# Patient Record
Sex: MALE | Race: WHITE | Employment: OTHER | URBAN - METROPOLITAN AREA
[De-identification: names, ages, dates, MRNs, and addresses within clinical notes are randomized per-mention and may not be internally consistent; named-entity substitution may affect disease eponyms.]

---

## 2017-01-23 DIAGNOSIS — F51.01 PRIMARY INSOMNIA: ICD-10-CM

## 2017-01-23 RX ORDER — ZOLPIDEM TARTRATE 10 MG/1
TABLET ORAL
Qty: 30 TABLET | Refills: 0 | Status: SHIPPED | OUTPATIENT
Start: 2017-01-23 | End: 2017-02-21 | Stop reason: SDUPTHER

## 2017-02-21 ENCOUNTER — NURSE ONLY (OUTPATIENT)
Dept: FAMILY MEDICINE CLINIC | Age: 67
End: 2017-02-21

## 2017-02-21 DIAGNOSIS — F51.01 PRIMARY INSOMNIA: ICD-10-CM

## 2017-02-21 DIAGNOSIS — F31.81 BIPOLAR 2 DISORDER (HCC): Primary | ICD-10-CM

## 2017-02-21 PROCEDURE — 36415 COLL VENOUS BLD VENIPUNCTURE: CPT | Performed by: FAMILY MEDICINE

## 2017-02-21 RX ORDER — ZOLPIDEM TARTRATE 10 MG/1
TABLET ORAL
Qty: 30 TABLET | Refills: 0 | Status: SHIPPED | OUTPATIENT
Start: 2017-02-21 | End: 2017-03-21 | Stop reason: SDUPTHER

## 2017-02-23 ENCOUNTER — OFFICE VISIT (OUTPATIENT)
Dept: FAMILY MEDICINE CLINIC | Age: 67
End: 2017-02-23

## 2017-02-23 VITALS
BODY MASS INDEX: 23.29 KG/M2 | RESPIRATION RATE: 16 BRPM | HEART RATE: 80 BPM | OXYGEN SATURATION: 98 % | DIASTOLIC BLOOD PRESSURE: 70 MMHG | WEIGHT: 167 LBS | SYSTOLIC BLOOD PRESSURE: 124 MMHG

## 2017-02-23 DIAGNOSIS — H91.90 HEARING LOSS, UNSPECIFIED LATERALITY: ICD-10-CM

## 2017-02-23 DIAGNOSIS — R09.81 SINUS CONGESTION: Primary | ICD-10-CM

## 2017-02-23 DIAGNOSIS — F51.01 PRIMARY INSOMNIA: ICD-10-CM

## 2017-02-23 DIAGNOSIS — H61.20 WAX IN EAR: ICD-10-CM

## 2017-02-23 PROCEDURE — 99214 OFFICE O/P EST MOD 30 MIN: CPT | Performed by: FAMILY MEDICINE

## 2017-02-23 RX ORDER — FLUTICASONE PROPIONATE 50 MCG
2 SPRAY, SUSPENSION (ML) NASAL DAILY
Qty: 1 BOTTLE | Refills: 5 | Status: SHIPPED | OUTPATIENT
Start: 2017-02-23 | End: 2018-10-08 | Stop reason: ALTCHOICE

## 2017-03-21 DIAGNOSIS — F51.01 PRIMARY INSOMNIA: ICD-10-CM

## 2017-03-21 RX ORDER — ZOLPIDEM TARTRATE 10 MG/1
TABLET ORAL
Qty: 30 TABLET | Refills: 0 | OUTPATIENT
Start: 2017-03-21 | End: 2017-04-17 | Stop reason: SDUPTHER

## 2017-04-17 DIAGNOSIS — F51.01 PRIMARY INSOMNIA: ICD-10-CM

## 2017-04-17 RX ORDER — ZOLPIDEM TARTRATE 10 MG/1
TABLET ORAL
Qty: 30 TABLET | Refills: 0 | Status: SHIPPED | OUTPATIENT
Start: 2017-04-17 | End: 2017-05-17 | Stop reason: SDUPTHER

## 2017-04-18 ENCOUNTER — TELEPHONE (OUTPATIENT)
Dept: FAMILY MEDICINE CLINIC | Age: 67
End: 2017-04-18

## 2017-05-02 ENCOUNTER — TELEPHONE (OUTPATIENT)
Dept: FAMILY MEDICINE CLINIC | Age: 67
End: 2017-05-02

## 2017-05-17 DIAGNOSIS — F51.01 PRIMARY INSOMNIA: ICD-10-CM

## 2017-05-18 RX ORDER — ZOLPIDEM TARTRATE 10 MG/1
TABLET ORAL
Qty: 30 TABLET | Refills: 0 | Status: SHIPPED | OUTPATIENT
Start: 2017-05-18 | End: 2017-06-14 | Stop reason: SDUPTHER

## 2017-06-14 DIAGNOSIS — F51.01 PRIMARY INSOMNIA: ICD-10-CM

## 2017-06-14 RX ORDER — ZOLPIDEM TARTRATE 10 MG/1
TABLET ORAL
Qty: 30 TABLET | Refills: 0 | Status: SHIPPED | OUTPATIENT
Start: 2017-06-14 | End: 2017-07-17 | Stop reason: SDUPTHER

## 2017-07-11 ENCOUNTER — NURSE ONLY (OUTPATIENT)
Dept: FAMILY MEDICINE CLINIC | Age: 67
End: 2017-07-11

## 2017-07-11 DIAGNOSIS — F31.81 BIPOLAR II DISORDER (HCC): Primary | ICD-10-CM

## 2017-07-11 PROCEDURE — 36415 COLL VENOUS BLD VENIPUNCTURE: CPT | Performed by: FAMILY MEDICINE

## 2017-07-17 DIAGNOSIS — F51.01 PRIMARY INSOMNIA: ICD-10-CM

## 2017-07-17 RX ORDER — ZOLPIDEM TARTRATE 10 MG/1
TABLET ORAL
Qty: 30 TABLET | Refills: 0 | Status: SHIPPED | OUTPATIENT
Start: 2017-07-17 | End: 2017-08-18 | Stop reason: SDUPTHER

## 2017-07-27 ENCOUNTER — NURSE ONLY (OUTPATIENT)
Dept: FAMILY MEDICINE CLINIC | Age: 67
End: 2017-07-27

## 2017-07-27 DIAGNOSIS — F31.81 BIPOLAR 2 DISORDER (HCC): ICD-10-CM

## 2017-07-27 PROCEDURE — 36415 COLL VENOUS BLD VENIPUNCTURE: CPT | Performed by: FAMILY MEDICINE

## 2017-08-18 DIAGNOSIS — F51.01 PRIMARY INSOMNIA: ICD-10-CM

## 2017-08-18 RX ORDER — ZOLPIDEM TARTRATE 10 MG/1
TABLET ORAL
Qty: 30 TABLET | Refills: 0 | OUTPATIENT
Start: 2017-08-18 | End: 2017-08-18

## 2017-08-18 RX ORDER — ZOLPIDEM TARTRATE 10 MG/1
TABLET ORAL
Qty: 30 TABLET | Refills: 0 | OUTPATIENT
Start: 2017-08-18 | End: 2017-09-19 | Stop reason: SDUPTHER

## 2017-09-19 DIAGNOSIS — F51.01 PRIMARY INSOMNIA: ICD-10-CM

## 2017-09-19 RX ORDER — ZOLPIDEM TARTRATE 10 MG/1
TABLET ORAL
Qty: 30 TABLET | Refills: 0 | OUTPATIENT
Start: 2017-09-19 | End: 2017-10-19 | Stop reason: SDUPTHER

## 2017-10-03 ENCOUNTER — PATIENT MESSAGE (OUTPATIENT)
Dept: FAMILY MEDICINE CLINIC | Age: 67
End: 2017-10-03

## 2017-10-03 NOTE — TELEPHONE ENCOUNTER
From: [employer]  To: Africa Rankin MD  Sent: 10/3/2017 3:32 PM EDT  Subject: Non-Urgent Ryan Hoang, hope all is well with you:)     Q. I have been getting a colonoscopy every five years since I was 38. There has never been any pathology or anything else. I'm now 74 and I was wondering if I should still get one. The last one was when I was 63. The physician who did them all, Stevenson Hedrick, retired last year. That's it, have a great day.      Amie Art

## 2017-10-19 DIAGNOSIS — F51.01 PRIMARY INSOMNIA: ICD-10-CM

## 2017-10-19 RX ORDER — ZOLPIDEM TARTRATE 10 MG/1
TABLET ORAL
Qty: 30 TABLET | Refills: 0 | Status: SHIPPED | OUTPATIENT
Start: 2017-10-19 | End: 2017-11-21 | Stop reason: SDUPTHER

## 2017-11-20 ENCOUNTER — NURSE ONLY (OUTPATIENT)
Dept: FAMILY MEDICINE CLINIC | Age: 67
End: 2017-11-20

## 2017-11-20 DIAGNOSIS — F31.81 BIPOLAR 2 DISORDER (HCC): Primary | ICD-10-CM

## 2017-11-20 PROCEDURE — 36415 COLL VENOUS BLD VENIPUNCTURE: CPT | Performed by: FAMILY MEDICINE

## 2017-11-20 NOTE — PROGRESS NOTES
Patient came into the office per Dr. Rowena Cade request for the following blood test(s): Lithium serum       Blood drawn in office by [facility]     # of tubes sent: 1 SST

## 2017-11-21 DIAGNOSIS — F51.01 PRIMARY INSOMNIA: ICD-10-CM

## 2017-11-22 RX ORDER — ZOLPIDEM TARTRATE 10 MG/1
TABLET ORAL
Qty: 30 TABLET | Refills: 0 | Status: SHIPPED | OUTPATIENT
Start: 2017-11-22 | End: 2017-12-22 | Stop reason: SDUPTHER

## 2017-11-22 NOTE — TELEPHONE ENCOUNTER
Patient is calling again to check the status of the refill. He is at the pharmacy and they still don't have the approval. Stated he is leaving in a few hours and will be gone for 4 days and he will run out.

## 2017-12-22 DIAGNOSIS — F51.01 PRIMARY INSOMNIA: ICD-10-CM

## 2017-12-22 RX ORDER — ZOLPIDEM TARTRATE 10 MG/1
TABLET ORAL
Qty: 30 TABLET | Refills: 0 | OUTPATIENT
Start: 2017-12-22 | End: 2018-01-30 | Stop reason: SDUPTHER

## 2017-12-22 NOTE — TELEPHONE ENCOUNTER
Last Seen: 2/23/17  Last Filled: 11/22/17  Last UDS: 8/25/16  OARRS Run On: 8/18/17  Med Agreement Signed On:8/25/16  Next Appointment: Nik Gonzalez

## 2017-12-27 DIAGNOSIS — F51.01 PRIMARY INSOMNIA: ICD-10-CM

## 2017-12-28 RX ORDER — ZOLPIDEM TARTRATE 10 MG/1
TABLET ORAL
Qty: 30 TABLET | Refills: 0 | OUTPATIENT
Start: 2017-12-28

## 2017-12-28 NOTE — TELEPHONE ENCOUNTER
Called into [facility] zolpidem (AMBIEN) 10 MG tablet [[id number]]   Order Details   Dose, Route, Frequency: As Directed    Dispense Quantity:  30 tablet Refills:  0 Fills remaining:  --           Sig: TAKE ONE-HALF TO ONE TABLET BY MOUTH EVERY NIGHT AT BEDTIME          Written Date:  12/22/17 Expiration Date:  06/20/18     Start Date:  12/22/17 End Date:  --     Ordering Provider:  -- Authorizing Provider:  Tonia Yanes MD Ordering User:  Tonia Yanes MD          Diagnosis Association: Primary insomnia (F51.01)      Original Order:  zolpidem (AMBIEN) 10 MG tablet [[id number]]      Pharmacy:  [address] [phone]      Pharmacy Comments:  --

## 2018-01-30 DIAGNOSIS — F51.01 PRIMARY INSOMNIA: ICD-10-CM

## 2018-01-30 RX ORDER — ZOLPIDEM TARTRATE 10 MG/1
TABLET ORAL
Qty: 30 TABLET | Refills: 0 | OUTPATIENT
Start: 2018-01-30 | End: 2018-03-01

## 2018-02-22 ENCOUNTER — NURSE ONLY (OUTPATIENT)
Dept: FAMILY MEDICINE CLINIC | Age: 68
End: 2018-02-22

## 2018-02-22 DIAGNOSIS — F31.81 BIPOLAR 2 DISORDER (HCC): ICD-10-CM

## 2018-02-22 PROCEDURE — 36415 COLL VENOUS BLD VENIPUNCTURE: CPT | Performed by: FAMILY MEDICINE

## 2018-03-08 ENCOUNTER — OFFICE VISIT (OUTPATIENT)
Dept: FAMILY MEDICINE CLINIC | Age: 68
End: 2018-03-08

## 2018-03-08 VITALS
SYSTOLIC BLOOD PRESSURE: 120 MMHG | WEIGHT: 170 LBS | OXYGEN SATURATION: 96 % | BODY MASS INDEX: 23.71 KG/M2 | DIASTOLIC BLOOD PRESSURE: 80 MMHG | HEART RATE: 85 BPM

## 2018-03-08 DIAGNOSIS — G47.01 INSOMNIA DUE TO MEDICAL CONDITION: Primary | ICD-10-CM

## 2018-03-08 DIAGNOSIS — M25.50 ARTHRALGIA OF MULTIPLE JOINTS: ICD-10-CM

## 2018-03-08 PROCEDURE — 1123F ACP DISCUSS/DSCN MKR DOCD: CPT | Performed by: FAMILY MEDICINE

## 2018-03-08 PROCEDURE — 4040F PNEUMOC VAC/ADMIN/RCVD: CPT | Performed by: FAMILY MEDICINE

## 2018-03-08 PROCEDURE — G8484 FLU IMMUNIZE NO ADMIN: HCPCS | Performed by: FAMILY MEDICINE

## 2018-03-08 PROCEDURE — G8427 DOCREV CUR MEDS BY ELIG CLIN: HCPCS | Performed by: FAMILY MEDICINE

## 2018-03-08 PROCEDURE — 3017F COLORECTAL CA SCREEN DOC REV: CPT | Performed by: FAMILY MEDICINE

## 2018-03-08 PROCEDURE — G8420 CALC BMI NORM PARAMETERS: HCPCS | Performed by: FAMILY MEDICINE

## 2018-03-08 PROCEDURE — 1036F TOBACCO NON-USER: CPT | Performed by: FAMILY MEDICINE

## 2018-03-08 PROCEDURE — 99213 OFFICE O/P EST LOW 20 MIN: CPT | Performed by: FAMILY MEDICINE

## 2018-03-08 RX ORDER — ZOLPIDEM TARTRATE 10 MG/1
TABLET ORAL
Qty: 30 TABLET | Refills: 5 | Status: SHIPPED | OUTPATIENT
Start: 2018-03-08 | End: 2018-09-14 | Stop reason: SDUPTHER

## 2018-03-08 ASSESSMENT — ENCOUNTER SYMPTOMS: SHORTNESS OF BREATH: 0

## 2018-04-02 ENCOUNTER — OFFICE VISIT (OUTPATIENT)
Dept: FAMILY MEDICINE CLINIC | Age: 68
End: 2018-04-02

## 2018-04-02 VITALS
OXYGEN SATURATION: 98 % | HEIGHT: 70 IN | SYSTOLIC BLOOD PRESSURE: 116 MMHG | WEIGHT: 168 LBS | DIASTOLIC BLOOD PRESSURE: 80 MMHG | HEART RATE: 79 BPM | BODY MASS INDEX: 24.05 KG/M2

## 2018-04-02 DIAGNOSIS — E78.2 MIXED HYPERLIPIDEMIA: ICD-10-CM

## 2018-04-02 DIAGNOSIS — Z13.6 SCREENING FOR AAA (ABDOMINAL AORTIC ANEURYSM): ICD-10-CM

## 2018-04-02 DIAGNOSIS — Z11.4 SCREENING FOR HIV WITHOUT PRESENCE OF RISK FACTORS: ICD-10-CM

## 2018-04-02 DIAGNOSIS — Z23 NEED FOR VACCINATION FOR PNEUMOCOCCUS: ICD-10-CM

## 2018-04-02 DIAGNOSIS — Z23 NEED FOR SHINGLES VACCINE: ICD-10-CM

## 2018-04-02 DIAGNOSIS — Z11.59 NEED FOR HEPATITIS C SCREENING TEST: ICD-10-CM

## 2018-04-02 DIAGNOSIS — Z12.11 COLON CANCER SCREENING: ICD-10-CM

## 2018-04-02 DIAGNOSIS — F31.81 BIPOLAR 2 DISORDER (HCC): ICD-10-CM

## 2018-04-02 DIAGNOSIS — Z00.00 ROUTINE GENERAL MEDICAL EXAMINATION AT A HEALTH CARE FACILITY: Primary | ICD-10-CM

## 2018-04-02 DIAGNOSIS — E55.9 VITAMIN D DEFICIENCY: ICD-10-CM

## 2018-04-02 LAB
A/G RATIO: 2 (ref 1.1–2.2)
ALBUMIN SERPL-MCNC: 4.5 G/DL (ref 3.4–5)
ALP BLD-CCNC: 64 U/L (ref 40–129)
ALT SERPL-CCNC: 24 U/L (ref 10–40)
ANION GAP SERPL CALCULATED.3IONS-SCNC: 12 MMOL/L (ref 3–16)
AST SERPL-CCNC: 31 U/L (ref 15–37)
BILIRUB SERPL-MCNC: <0.2 MG/DL (ref 0–1)
BUN BLDV-MCNC: 18 MG/DL (ref 7–20)
CALCIUM SERPL-MCNC: 9.3 MG/DL (ref 8.3–10.6)
CHLORIDE BLD-SCNC: 101 MMOL/L (ref 99–110)
CHOLESTEROL, TOTAL: 199 MG/DL (ref 0–199)
CO2: 28 MMOL/L (ref 21–32)
CREAT SERPL-MCNC: 0.9 MG/DL (ref 0.8–1.3)
GFR AFRICAN AMERICAN: >60
GFR NON-AFRICAN AMERICAN: >60
GLOBULIN: 2.3 G/DL
GLUCOSE BLD-MCNC: 91 MG/DL (ref 70–99)
HDLC SERPL-MCNC: 74 MG/DL (ref 40–60)
HEPATITIS C ANTIBODY INTERPRETATION: NORMAL
LDL CHOLESTEROL CALCULATED: 111 MG/DL
POTASSIUM SERPL-SCNC: 5.7 MMOL/L (ref 3.5–5.1)
SODIUM BLD-SCNC: 141 MMOL/L (ref 136–145)
TOTAL PROTEIN: 6.8 G/DL (ref 6.4–8.2)
TRIGL SERPL-MCNC: 71 MG/DL (ref 0–150)
VLDLC SERPL CALC-MCNC: 14 MG/DL

## 2018-04-02 PROCEDURE — G0438 PPPS, INITIAL VISIT: HCPCS | Performed by: FAMILY MEDICINE

## 2018-04-02 PROCEDURE — 90670 PCV13 VACCINE IM: CPT | Performed by: FAMILY MEDICINE

## 2018-04-02 PROCEDURE — G0009 ADMIN PNEUMOCOCCAL VACCINE: HCPCS | Performed by: FAMILY MEDICINE

## 2018-04-02 PROCEDURE — 36415 COLL VENOUS BLD VENIPUNCTURE: CPT | Performed by: FAMILY MEDICINE

## 2018-04-02 RX ORDER — VENLAFAXINE 75 MG/1
187 TABLET ORAL DAILY
COMMUNITY
End: 2022-04-13

## 2018-04-02 ASSESSMENT — LIFESTYLE VARIABLES: HOW OFTEN DO YOU HAVE A DRINK CONTAINING ALCOHOL: 0

## 2018-04-02 ASSESSMENT — ANXIETY QUESTIONNAIRES: GAD7 TOTAL SCORE: 1

## 2018-04-02 ASSESSMENT — PATIENT HEALTH QUESTIONNAIRE - PHQ9: SUM OF ALL RESPONSES TO PHQ QUESTIONS 1-9: 0

## 2018-04-03 LAB — VITAMIN D 25-HYDROXY: 24.5 NG/ML

## 2018-04-05 LAB
HIV AG/AB: NORMAL
HIV ANTIGEN: NORMAL
HIV-1 ANTIBODY: NORMAL
HIV-2 AB: NORMAL

## 2018-04-19 ENCOUNTER — OFFICE VISIT (OUTPATIENT)
Dept: ORTHOPEDIC SURGERY | Age: 68
End: 2018-04-19

## 2018-04-19 VITALS
SYSTOLIC BLOOD PRESSURE: 120 MMHG | BODY MASS INDEX: 23.1 KG/M2 | HEIGHT: 71 IN | WEIGHT: 165 LBS | DIASTOLIC BLOOD PRESSURE: 81 MMHG | HEART RATE: 87 BPM

## 2018-04-19 DIAGNOSIS — M25.551 HIP PAIN, RIGHT: Primary | ICD-10-CM

## 2018-04-19 DIAGNOSIS — M24.851 SNAPPING HIP SYNDROME, RIGHT: ICD-10-CM

## 2018-04-19 PROCEDURE — 99203 OFFICE O/P NEW LOW 30 MIN: CPT | Performed by: PHYSICIAN ASSISTANT

## 2018-04-19 PROCEDURE — G8420 CALC BMI NORM PARAMETERS: HCPCS | Performed by: PHYSICIAN ASSISTANT

## 2018-04-19 PROCEDURE — 1036F TOBACCO NON-USER: CPT | Performed by: PHYSICIAN ASSISTANT

## 2018-04-19 PROCEDURE — 3017F COLORECTAL CA SCREEN DOC REV: CPT | Performed by: PHYSICIAN ASSISTANT

## 2018-04-19 PROCEDURE — 4040F PNEUMOC VAC/ADMIN/RCVD: CPT | Performed by: PHYSICIAN ASSISTANT

## 2018-04-19 PROCEDURE — G8427 DOCREV CUR MEDS BY ELIG CLIN: HCPCS | Performed by: PHYSICIAN ASSISTANT

## 2018-04-19 PROCEDURE — 1123F ACP DISCUSS/DSCN MKR DOCD: CPT | Performed by: PHYSICIAN ASSISTANT

## 2018-04-23 ENCOUNTER — NURSE ONLY (OUTPATIENT)
Dept: FAMILY MEDICINE CLINIC | Age: 68
End: 2018-04-23

## 2018-04-23 DIAGNOSIS — E87.5 SERUM POTASSIUM ELEVATED: Primary | ICD-10-CM

## 2018-04-23 LAB
ANION GAP SERPL CALCULATED.3IONS-SCNC: 14 MMOL/L (ref 3–16)
BUN BLDV-MCNC: 17 MG/DL (ref 7–20)
CALCIUM SERPL-MCNC: 9.2 MG/DL (ref 8.3–10.6)
CHLORIDE BLD-SCNC: 103 MMOL/L (ref 99–110)
CO2: 26 MMOL/L (ref 21–32)
CREAT SERPL-MCNC: 0.9 MG/DL (ref 0.8–1.3)
GFR AFRICAN AMERICAN: >60
GFR NON-AFRICAN AMERICAN: >60
GLUCOSE BLD-MCNC: 112 MG/DL (ref 70–99)
POTASSIUM SERPL-SCNC: 4.3 MMOL/L (ref 3.5–5.1)
SODIUM BLD-SCNC: 143 MMOL/L (ref 136–145)

## 2018-04-23 PROCEDURE — 36415 COLL VENOUS BLD VENIPUNCTURE: CPT | Performed by: FAMILY MEDICINE

## 2018-04-24 ENCOUNTER — HOSPITAL ENCOUNTER (OUTPATIENT)
Dept: PHYSICAL THERAPY | Age: 68
Discharge: HOME | End: 2018-04-30
Admitting: PHYSICIAN ASSISTANT

## 2018-05-01 ENCOUNTER — HOSPITAL ENCOUNTER (OUTPATIENT)
Dept: PHYSICAL THERAPY | Age: 68
Discharge: HOME | End: 2018-05-31
Attending: PHYSICIAN ASSISTANT | Admitting: PHYSICIAN ASSISTANT

## 2018-05-01 ENCOUNTER — HOSPITAL ENCOUNTER (OUTPATIENT)
Dept: PHYSICAL THERAPY | Age: 68
Discharge: HOME | End: 2018-05-02
Admitting: PHYSICIAN ASSISTANT

## 2018-05-08 ENCOUNTER — HOSPITAL ENCOUNTER (OUTPATIENT)
Dept: PHYSICAL THERAPY | Age: 68
Discharge: HOME | End: 2018-05-09
Admitting: PHYSICIAN ASSISTANT

## 2018-05-10 ENCOUNTER — OFFICE VISIT (OUTPATIENT)
Dept: ORTHOPEDIC SURGERY | Age: 68
End: 2018-05-10

## 2018-05-10 VITALS — BODY MASS INDEX: 23.09 KG/M2 | WEIGHT: 164.9 LBS | HEIGHT: 71 IN

## 2018-05-10 DIAGNOSIS — M24.851 SNAPPING HIP SYNDROME, RIGHT: Primary | ICD-10-CM

## 2018-05-10 DIAGNOSIS — M70.61 TROCHANTERIC BURSITIS OF RIGHT HIP: ICD-10-CM

## 2018-05-10 PROCEDURE — 3017F COLORECTAL CA SCREEN DOC REV: CPT | Performed by: ORTHOPAEDIC SURGERY

## 2018-05-10 PROCEDURE — G8427 DOCREV CUR MEDS BY ELIG CLIN: HCPCS | Performed by: ORTHOPAEDIC SURGERY

## 2018-05-10 PROCEDURE — 1123F ACP DISCUSS/DSCN MKR DOCD: CPT | Performed by: ORTHOPAEDIC SURGERY

## 2018-05-10 PROCEDURE — G8420 CALC BMI NORM PARAMETERS: HCPCS | Performed by: ORTHOPAEDIC SURGERY

## 2018-05-10 PROCEDURE — 99213 OFFICE O/P EST LOW 20 MIN: CPT | Performed by: ORTHOPAEDIC SURGERY

## 2018-05-10 PROCEDURE — 1036F TOBACCO NON-USER: CPT | Performed by: ORTHOPAEDIC SURGERY

## 2018-05-10 PROCEDURE — 4040F PNEUMOC VAC/ADMIN/RCVD: CPT | Performed by: ORTHOPAEDIC SURGERY

## 2018-05-15 ENCOUNTER — HOSPITAL ENCOUNTER (OUTPATIENT)
Dept: PHYSICAL THERAPY | Age: 68
Discharge: HOME | End: 2018-05-16
Admitting: PHYSICIAN ASSISTANT

## 2018-05-29 ENCOUNTER — HOSPITAL ENCOUNTER (OUTPATIENT)
Dept: PHYSICAL THERAPY | Age: 68
Discharge: HOME | End: 2018-05-30
Admitting: PHYSICIAN ASSISTANT

## 2018-06-01 ENCOUNTER — HOSPITAL ENCOUNTER (OUTPATIENT)
Dept: PHYSICAL THERAPY | Age: 68
Discharge: HOME | End: 2018-06-30
Attending: PHYSICIAN ASSISTANT | Admitting: PHYSICIAN ASSISTANT

## 2018-07-31 ENCOUNTER — NURSE ONLY (OUTPATIENT)
Dept: FAMILY MEDICINE CLINIC | Age: 68
End: 2018-07-31

## 2018-07-31 DIAGNOSIS — F31.81 BIPOLAR 2 DISORDER (HCC): Primary | ICD-10-CM

## 2018-07-31 NOTE — PROGRESS NOTES
Patient came into the office per physician's request for the following blood test(s): lithium.       Blood drawn in office by [facility]    # of tubes sent: 1 SST

## 2018-09-14 DIAGNOSIS — G47.01 INSOMNIA DUE TO MEDICAL CONDITION: ICD-10-CM

## 2018-09-14 RX ORDER — ZOLPIDEM TARTRATE 10 MG/1
TABLET ORAL
Qty: 30 TABLET | Refills: 5 | Status: SHIPPED | OUTPATIENT
Start: 2018-09-14 | End: 2018-10-08 | Stop reason: SDUPTHER

## 2018-09-18 DIAGNOSIS — G47.01 INSOMNIA DUE TO MEDICAL CONDITION: ICD-10-CM

## 2018-09-18 RX ORDER — ZOLPIDEM TARTRATE 10 MG/1
TABLET ORAL
Qty: 30 TABLET | Refills: 0 | OUTPATIENT
Start: 2018-09-18 | End: 2018-10-18

## 2018-10-08 ENCOUNTER — OFFICE VISIT (OUTPATIENT)
Dept: FAMILY MEDICINE CLINIC | Age: 68
End: 2018-10-08
Payer: MEDICARE

## 2018-10-08 VITALS
DIASTOLIC BLOOD PRESSURE: 80 MMHG | OXYGEN SATURATION: 96 % | WEIGHT: 163 LBS | HEART RATE: 83 BPM | BODY MASS INDEX: 22.74 KG/M2 | SYSTOLIC BLOOD PRESSURE: 100 MMHG

## 2018-10-08 DIAGNOSIS — G47.01 INSOMNIA DUE TO MEDICAL CONDITION: ICD-10-CM

## 2018-10-08 DIAGNOSIS — L82.1 SEBORRHEIC KERATOSES: Primary | ICD-10-CM

## 2018-10-08 PROCEDURE — G8420 CALC BMI NORM PARAMETERS: HCPCS | Performed by: FAMILY MEDICINE

## 2018-10-08 PROCEDURE — 3017F COLORECTAL CA SCREEN DOC REV: CPT | Performed by: FAMILY MEDICINE

## 2018-10-08 PROCEDURE — 4040F PNEUMOC VAC/ADMIN/RCVD: CPT | Performed by: FAMILY MEDICINE

## 2018-10-08 PROCEDURE — 99213 OFFICE O/P EST LOW 20 MIN: CPT | Performed by: FAMILY MEDICINE

## 2018-10-08 PROCEDURE — 1101F PT FALLS ASSESS-DOCD LE1/YR: CPT | Performed by: FAMILY MEDICINE

## 2018-10-08 PROCEDURE — 1123F ACP DISCUSS/DSCN MKR DOCD: CPT | Performed by: FAMILY MEDICINE

## 2018-10-08 PROCEDURE — G8427 DOCREV CUR MEDS BY ELIG CLIN: HCPCS | Performed by: FAMILY MEDICINE

## 2018-10-08 PROCEDURE — 1036F TOBACCO NON-USER: CPT | Performed by: FAMILY MEDICINE

## 2018-10-08 PROCEDURE — G8484 FLU IMMUNIZE NO ADMIN: HCPCS | Performed by: FAMILY MEDICINE

## 2018-10-08 RX ORDER — CETIRIZINE HYDROCHLORIDE, PSEUDOEPHEDRINE HYDROCHLORIDE 5; 120 MG/1; MG/1
1 TABLET, FILM COATED, EXTENDED RELEASE ORAL PRN
COMMUNITY

## 2018-10-08 RX ORDER — ZOLPIDEM TARTRATE 10 MG/1
TABLET ORAL
Qty: 30 TABLET | Refills: 5 | Status: SHIPPED | OUTPATIENT
Start: 2018-10-08 | End: 2018-11-08

## 2018-10-08 NOTE — PROGRESS NOTES
alert and oriented to person, place, and time. Skin: Skin is warm and dry. Few scattered SKs upper chest and upper back   Psychiatric: He has a normal mood and affect. His behavior is normal. Judgment and thought content normal.         /80 (Site: Left Upper Arm, Position: Sitting, Cuff Size: Medium Adult)   Pulse 83   Wt 163 lb (73.9 kg)   SpO2 96%   BMI 22.74 kg/m²     Assessment/Plan:    Nohemy Tran was seen today for 6 month follow-up and other. Diagnoses and all orders for this visit:    Seborrheic keratoses   Assurance given    Insomnia due to medical condition  -     zolpidem (AMBIEN) 10 MG tablet; TAKE ONE-HALF TO ONE TABLET BY MOUTH EVERY NIGHT AT BEDTIME, rf      Controlled Substances Monitoring:     RX Monitoring 10/8/2018   Attestation -   Documentation Possible medication side effects, risk of tolerance/dependence & alternative treatments discussed. ;No signs of potential drug abuse or diversion identified. Chronic Pain -   Medication Contracts Existing medication contract.

## 2018-11-27 ENCOUNTER — NURSE ONLY (OUTPATIENT)
Dept: FAMILY MEDICINE CLINIC | Age: 68
End: 2018-11-27

## 2018-11-27 DIAGNOSIS — F31.81 BIPOLAR 2 DISORDER (HCC): Primary | ICD-10-CM

## 2019-02-18 ENCOUNTER — OFFICE VISIT (OUTPATIENT)
Dept: FAMILY MEDICINE CLINIC | Age: 69
End: 2019-02-18
Payer: MEDICARE

## 2019-02-18 ENCOUNTER — HOSPITAL ENCOUNTER (OUTPATIENT)
Dept: CT IMAGING | Age: 69
Discharge: HOME | End: 2019-02-18
Payer: MEDICARE

## 2019-02-18 VITALS
SYSTOLIC BLOOD PRESSURE: 116 MMHG | OXYGEN SATURATION: 98 % | RESPIRATION RATE: 16 BRPM | HEART RATE: 93 BPM | WEIGHT: 163.8 LBS | DIASTOLIC BLOOD PRESSURE: 70 MMHG | BODY MASS INDEX: 22.93 KG/M2 | HEIGHT: 71 IN

## 2019-02-18 DIAGNOSIS — R10.30 LOWER ABDOMINAL PAIN: Primary | ICD-10-CM

## 2019-02-18 DIAGNOSIS — R10.30 LOWER ABDOMINAL PAIN: ICD-10-CM

## 2019-02-18 DIAGNOSIS — K57.92 DIVERTICULITIS: ICD-10-CM

## 2019-02-18 LAB
BILIRUBIN, POC: NORMAL
BLOOD URINE, POC: NORMAL
CLARITY, POC: CLEAR
COLOR, POC: YELLOW
GLUCOSE URINE, POC: NORMAL
KETONES, POC: NORMAL
LEUKOCYTE EST, POC: NORMAL
NITRITE, POC: NORMAL
PH, POC: 7.5
PROTEIN, POC: NORMAL
SPECIFIC GRAVITY, POC: 1.02
UROBILINOGEN, POC: 0.2

## 2019-02-18 PROCEDURE — 4040F PNEUMOC VAC/ADMIN/RCVD: CPT | Performed by: NURSE PRACTITIONER

## 2019-02-18 PROCEDURE — 1123F ACP DISCUSS/DSCN MKR DOCD: CPT | Performed by: NURSE PRACTITIONER

## 2019-02-18 PROCEDURE — 1036F TOBACCO NON-USER: CPT | Performed by: NURSE PRACTITIONER

## 2019-02-18 PROCEDURE — G8427 DOCREV CUR MEDS BY ELIG CLIN: HCPCS | Performed by: NURSE PRACTITIONER

## 2019-02-18 PROCEDURE — 81002 URINALYSIS NONAUTO W/O SCOPE: CPT | Performed by: NURSE PRACTITIONER

## 2019-02-18 PROCEDURE — 99213 OFFICE O/P EST LOW 20 MIN: CPT | Performed by: NURSE PRACTITIONER

## 2019-02-18 PROCEDURE — 1101F PT FALLS ASSESS-DOCD LE1/YR: CPT | Performed by: NURSE PRACTITIONER

## 2019-02-18 PROCEDURE — G8484 FLU IMMUNIZE NO ADMIN: HCPCS | Performed by: NURSE PRACTITIONER

## 2019-02-18 PROCEDURE — 3017F COLORECTAL CA SCREEN DOC REV: CPT | Performed by: NURSE PRACTITIONER

## 2019-02-18 PROCEDURE — G8420 CALC BMI NORM PARAMETERS: HCPCS | Performed by: NURSE PRACTITIONER

## 2019-02-18 PROCEDURE — 74176 CT ABD & PELVIS W/O CONTRAST: CPT

## 2019-02-18 RX ORDER — METRONIDAZOLE 500 MG/1
500 TABLET ORAL 3 TIMES DAILY
Qty: 30 TABLET | Refills: 0 | Status: SHIPPED | OUTPATIENT
Start: 2019-02-18 | End: 2019-02-28

## 2019-02-18 RX ORDER — CIPROFLOXACIN 500 MG/1
500 TABLET, FILM COATED ORAL 2 TIMES DAILY
Qty: 20 TABLET | Refills: 0 | Status: SHIPPED | OUTPATIENT
Start: 2019-02-18 | End: 2019-02-28

## 2019-02-18 ASSESSMENT — ENCOUNTER SYMPTOMS
ABDOMINAL DISTENTION: 0
COUGH: 0
ABDOMINAL PAIN: 1
BLOOD IN STOOL: 0
EYES NEGATIVE: 1
DIARRHEA: 1
CHEST TIGHTNESS: 0
NAUSEA: 0
CONSTIPATION: 1

## 2019-02-28 ENCOUNTER — TELEPHONE (OUTPATIENT)
Dept: FAMILY MEDICINE CLINIC | Age: 69
End: 2019-02-28

## 2019-02-28 RX ORDER — OMEPRAZOLE 20 MG/1
CAPSULE, DELAYED RELEASE ORAL
Qty: 60 CAPSULE | Refills: 1 | Status: SHIPPED | OUTPATIENT
Start: 2019-02-28 | End: 2019-04-04

## 2019-03-04 ENCOUNTER — TELEPHONE (OUTPATIENT)
Dept: FAMILY MEDICINE CLINIC | Age: 69
End: 2019-03-04

## 2019-03-04 NOTE — TELEPHONE ENCOUNTER
He is welcome to see one, but there's not a lot other than decrease fiber during a flare of diverticulitis and o/w increase fiber. If he wants to talk to dietician about fiber, pls offer Niko.  Lovely.

## 2019-04-04 ENCOUNTER — OFFICE VISIT (OUTPATIENT)
Dept: FAMILY MEDICINE CLINIC | Age: 69
End: 2019-04-04
Payer: MEDICARE

## 2019-04-04 VITALS
HEART RATE: 72 BPM | SYSTOLIC BLOOD PRESSURE: 122 MMHG | WEIGHT: 159 LBS | DIASTOLIC BLOOD PRESSURE: 82 MMHG | OXYGEN SATURATION: 95 % | BODY MASS INDEX: 22.18 KG/M2

## 2019-04-04 DIAGNOSIS — K57.92 DIVERTICULITIS: Primary | ICD-10-CM

## 2019-04-04 PROCEDURE — G8427 DOCREV CUR MEDS BY ELIG CLIN: HCPCS | Performed by: NURSE PRACTITIONER

## 2019-04-04 PROCEDURE — G8420 CALC BMI NORM PARAMETERS: HCPCS | Performed by: NURSE PRACTITIONER

## 2019-04-04 PROCEDURE — 99213 OFFICE O/P EST LOW 20 MIN: CPT | Performed by: NURSE PRACTITIONER

## 2019-04-04 PROCEDURE — 1036F TOBACCO NON-USER: CPT | Performed by: NURSE PRACTITIONER

## 2019-04-04 PROCEDURE — 3017F COLORECTAL CA SCREEN DOC REV: CPT | Performed by: NURSE PRACTITIONER

## 2019-04-04 PROCEDURE — 4040F PNEUMOC VAC/ADMIN/RCVD: CPT | Performed by: NURSE PRACTITIONER

## 2019-04-04 PROCEDURE — 1123F ACP DISCUSS/DSCN MKR DOCD: CPT | Performed by: NURSE PRACTITIONER

## 2019-04-04 ASSESSMENT — ENCOUNTER SYMPTOMS
BLOOD IN STOOL: 0
DIARRHEA: 0
ABDOMINAL PAIN: 0

## 2019-04-04 NOTE — PROGRESS NOTES
decrease a1c, help hypertension, become more active, run a race  17. Do you drink caffeine? If so how much? And what is the caffeine, Coffee or tea? Cream and sugar?    - couple cups of coffee. 18. What do they cook with? What kind of oil, butter?    - olive oil, coconut oil,   19. What are barriers you have for working out? What prevents you from getting to work out regularly?    - pt does work out regularly   21. What do you do when youre stressed out? Do you eat if so what kind of foods do you eat?    - not really,       Review of Systems   Gastrointestinal: Negative for abdominal pain, blood in stool and diarrhea. All other systems reviewed and are negative. Objective:   Physical Exam   Constitutional: He is oriented to person, place, and time. He appears well-developed and well-nourished. Cardiovascular: Normal rate. Neurological: He is alert and oriented to person, place, and time. Skin: Skin is warm and dry. Capillary refill takes less than 2 seconds. Psychiatric: He has a normal mood and affect. His behavior is normal. Judgment and thought content normal.   Vitals reviewed. Assessment/Plan   1. Diverticulitis  Long discussion with patient about increasing water intake. Patient drinks minimal water and does drink coffee. Educated patient importance of drinking water with high-fiber to help decrease incidence of diverticulitis. Long discussion today with patient about diverticulitis. Patient educated about increasing fiber and slightly once viable patient given handouts at this time. Patient will follow-up as needed. More than 50% time spent counseling patient on nutrition for diverticulitis.         Quality & Risk Score Accuracy    Last edited 04/04/19 14:07 EDT by TRINA Joy CNP, APRN - MEJIA

## 2019-04-13 DIAGNOSIS — G47.00 INSOMNIA, UNSPECIFIED TYPE: Primary | ICD-10-CM

## 2019-04-15 RX ORDER — ZOLPIDEM TARTRATE 10 MG/1
TABLET ORAL
Qty: 30 TABLET | Refills: 0 | Status: SHIPPED | OUTPATIENT
Start: 2019-04-15 | End: 2019-05-22 | Stop reason: SDUPTHER

## 2019-04-30 ENCOUNTER — NURSE ONLY (OUTPATIENT)
Dept: FAMILY MEDICINE CLINIC | Age: 69
End: 2019-04-30

## 2019-04-30 NOTE — PROGRESS NOTES
Patient came into the office per physician's request for the following blood test(s): lithium.       Blood drawn in office by [facility] the [facility]    # of tubes sent: 1 sst

## 2019-05-22 ENCOUNTER — OFFICE VISIT (OUTPATIENT)
Dept: FAMILY MEDICINE CLINIC | Age: 69
End: 2019-05-22
Payer: MEDICARE

## 2019-05-22 VITALS
HEART RATE: 90 BPM | RESPIRATION RATE: 16 BRPM | HEIGHT: 71 IN | DIASTOLIC BLOOD PRESSURE: 60 MMHG | SYSTOLIC BLOOD PRESSURE: 94 MMHG | BODY MASS INDEX: 22.12 KG/M2 | OXYGEN SATURATION: 99 % | WEIGHT: 158 LBS

## 2019-05-22 DIAGNOSIS — F31.81 BIPOLAR 2 DISORDER (HCC): ICD-10-CM

## 2019-05-22 DIAGNOSIS — K44.9 HIATAL HERNIA: ICD-10-CM

## 2019-05-22 DIAGNOSIS — G47.00 INSOMNIA, UNSPECIFIED TYPE: Primary | ICD-10-CM

## 2019-05-22 DIAGNOSIS — Z87.19 HISTORY OF COLONIC DIVERTICULITIS: ICD-10-CM

## 2019-05-22 PROCEDURE — 1123F ACP DISCUSS/DSCN MKR DOCD: CPT | Performed by: FAMILY MEDICINE

## 2019-05-22 PROCEDURE — 99213 OFFICE O/P EST LOW 20 MIN: CPT | Performed by: FAMILY MEDICINE

## 2019-05-22 PROCEDURE — G8427 DOCREV CUR MEDS BY ELIG CLIN: HCPCS | Performed by: FAMILY MEDICINE

## 2019-05-22 PROCEDURE — 1036F TOBACCO NON-USER: CPT | Performed by: FAMILY MEDICINE

## 2019-05-22 PROCEDURE — G8420 CALC BMI NORM PARAMETERS: HCPCS | Performed by: FAMILY MEDICINE

## 2019-05-22 PROCEDURE — 3017F COLORECTAL CA SCREEN DOC REV: CPT | Performed by: FAMILY MEDICINE

## 2019-05-22 PROCEDURE — 4040F PNEUMOC VAC/ADMIN/RCVD: CPT | Performed by: FAMILY MEDICINE

## 2019-05-22 RX ORDER — ZOLPIDEM TARTRATE 10 MG/1
TABLET ORAL
Qty: 30 TABLET | Refills: 5 | Status: SHIPPED | OUTPATIENT
Start: 2019-05-22 | End: 2019-12-04 | Stop reason: SDUPTHER

## 2019-05-22 ASSESSMENT — ENCOUNTER SYMPTOMS: SHORTNESS OF BREATH: 0

## 2019-05-22 NOTE — PROGRESS NOTES
Patient: Shaheed Morrison is a 81 y.o.male who presents today with the following Chief Complaint(s):  Chief Complaint   Patient presents with    Medication Check     Karen Weathers         HPI: Has been working on higher fiber diet, fresh fruits and vegetables since recent diverticulitis 2/18/2019 visit. CT showed uncomplicated sigmoid diverticulitis and small hiatal hernia. Pt took prilosec while on abx for infx. 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 Pt has been seeing dentist q 3 mo since, was told no further s/o gerd. Has been sleeping well with ambien 10 mg. For 1 month 2 mo ago, pt used otc Zquil instead of Karen Other. Felt odd or hyper with zquil, felt it kept him awake. BPD can flare w/inadequate sleep. Lately, has been cutting ambien 10 in half and taking 5 mg qhs. Current Outpatient Medications   Medication Sig Dispense Refill    zolpidem (AMBIEN) 10 MG tablet TAKE ONE-HALF TO ONE TABLET BY MOUTH EVERY NIGHT AT BEDTIME 30 tablet 5    VIAGRA 100 MG tablet TAKE ONE TABLET BY MOUTH DAILY AS NEEDED FOR ERECTILE DYSFUNCTION 6 tablet 3    cetirizine-psuedoephedrine (ZYRTEC-D) 5-120 MG per extended release tablet Take 1 tablet by mouth 2 times daily      venlafaxine (EFFEXOR) 75 MG tablet Take 75 mg by mouth 3 times daily      lithium 300 MG capsule Take 600 mg by mouth every evening        No current facility-administered medications for this visit. Patient's past medical history,surgical history, family history, medications,  and allergies  were all reviewed and updated as appropriate today. Review of Systems   Constitutional: Negative for activity change, appetite change, fatigue and unexpected weight change. Respiratory: Negative for shortness of breath. Cardiovascular: Negative for chest pain and palpitations. Psychiatric/Behavioral: Positive for sleep disturbance. Negative for decreased concentration. The patient is not nervous/anxious. Physical Exam   Constitutional: He is oriented to person, place, and time. He appears well-developed and well-nourished. HENT:   Head: Normocephalic and atraumatic. Eyes: Conjunctivae and EOM are normal. No scleral icterus. Pulmonary/Chest: Effort normal.   Musculoskeletal: Normal range of motion. Neurological: He is alert and oriented to person, place, and time. Skin: Skin is warm and dry. Psychiatric: He has a normal mood and affect. His behavior is normal. Judgment and thought content normal.         BP 94/60 (Site: Left Upper Arm, Position: Sitting, Cuff Size: Medium Adult)   Pulse 90   Resp 16   Ht 5' 11\" (1.803 m)   Wt 158 lb (71.7 kg)   SpO2 99%   BMI 22.04 kg/m²     Assessment/Plan:    Yessi Hawley was seen today for medication check. Diagnoses and all orders for this visit:    Insomnia, unspecified type  -     zolpidem (AMBIEN) 10 MG tablet; TAKE ONE-HALF TO ONE TABLET BY MOUTH EVERY NIGHT AT BEDTIME. As d/w pt, goal is to eventually rx 5 mg pills. Hiatal hernia   Explained to pt, currently asymptomatic    History of colonic diverticulitis   Cont high fiber diet    Quality & Risk Score Accuracy    Visit Dx:  F31.81 - Bipolar 2 disorder ([facility]  Assessment and plan:  Stable based upon symptoms and exam. Continue current treatment plan and follow up at least yearly. Last edited 05/22/19 17:28 EDT by Octavia De La Fuente MD             Controlled Substances Monitoring:     RX Monitoring 5/22/2019   Attestation The Prescription Monitoring Report for this patient was reviewed today.    Chronic Pain Routine Monitoring -   Chronic Pain > 80 MEDD -

## 2019-06-19 ENCOUNTER — OFFICE VISIT (OUTPATIENT)
Dept: FAMILY MEDICINE CLINIC | Age: 69
End: 2019-06-19
Payer: MEDICARE

## 2019-06-19 VITALS
HEART RATE: 90 BPM | OXYGEN SATURATION: 98 % | DIASTOLIC BLOOD PRESSURE: 66 MMHG | HEIGHT: 71 IN | WEIGHT: 156 LBS | BODY MASS INDEX: 21.84 KG/M2 | SYSTOLIC BLOOD PRESSURE: 130 MMHG

## 2019-06-19 DIAGNOSIS — Z12.11 COLON CANCER SCREENING: Primary | ICD-10-CM

## 2019-06-19 DIAGNOSIS — Z23 NEED FOR PROPHYLACTIC VACCINATION AGAINST STREPTOCOCCUS PNEUMONIAE (PNEUMOCOCCUS): ICD-10-CM

## 2019-06-19 DIAGNOSIS — Z00.00 ROUTINE GENERAL MEDICAL EXAMINATION AT A HEALTH CARE FACILITY: ICD-10-CM

## 2019-06-19 PROCEDURE — G0439 PPPS, SUBSEQ VISIT: HCPCS | Performed by: FAMILY MEDICINE

## 2019-06-19 PROCEDURE — 1123F ACP DISCUSS/DSCN MKR DOCD: CPT | Performed by: FAMILY MEDICINE

## 2019-06-19 PROCEDURE — 3017F COLORECTAL CA SCREEN DOC REV: CPT | Performed by: FAMILY MEDICINE

## 2019-06-19 PROCEDURE — 90732 PPSV23 VACC 2 YRS+ SUBQ/IM: CPT | Performed by: FAMILY MEDICINE

## 2019-06-19 PROCEDURE — G0009 ADMIN PNEUMOCOCCAL VACCINE: HCPCS | Performed by: FAMILY MEDICINE

## 2019-06-19 PROCEDURE — 4040F PNEUMOC VAC/ADMIN/RCVD: CPT | Performed by: FAMILY MEDICINE

## 2019-06-19 ASSESSMENT — ANXIETY QUESTIONNAIRES: GAD7 TOTAL SCORE: 1

## 2019-06-19 ASSESSMENT — PATIENT HEALTH QUESTIONNAIRE - PHQ9
SUM OF ALL RESPONSES TO PHQ QUESTIONS 1-9: 0
SUM OF ALL RESPONSES TO PHQ QUESTIONS 1-9: 0

## 2019-06-19 ASSESSMENT — LIFESTYLE VARIABLES: HOW OFTEN DO YOU HAVE A DRINK CONTAINING ALCOHOL: 0

## 2019-06-19 NOTE — PROGRESS NOTES
Brother     Thyroid Disease Brother     Cancer Paternal Aunt         ovarian    Cancer Paternal Grandfather         lymphoma    Cancer Paternal Aunt     Diabetes Neg Hx        CareTeam (Including outside providers/suppliers regularly involved in providing care):   Patient Care Team:  Patience Bernard MD as PCP - General (Family Medicine)  Patience Bernard MD [facility] PCP [facility]    Wt Readings from Last 3 Encounters:   06/19/19 156 lb (70.8 kg)   05/22/19 158 lb (71.7 kg)   04/04/19 159 lb (72.1 kg)     Vitals:    06/19/19 1326   BP: 130/66   Site: Left Upper Arm   Position: Sitting   Pulse: 90   SpO2: 98%   Weight: 156 lb (70.8 kg)   Height: 5' 11\" (1.803 m)     Body mass index is 21.76 kg/m². Based upon direct observation of the patient, evaluation of cognition reveals recent and remote memory intact. Patient's complete Health Risk Assessment and screening values have been reviewed and are found in Flowsheets. The following problems were reviewed today and where indicated follow up appointments were made and/or referrals ordered. Positive Risk Factor Screenings with Interventions:     Health Habits/Nutrition:  Health Habits/Nutrition  Do you exercise for at least 20 minutes 2-3 times per week?: Yes  Have you lost any weight without trying in the past 3 months?: (!) Yes(pt states when he had Diverticulitis back March 2019)  Do you eat fewer than 2 meals per day?: No  Have you seen a dentist within the past year?: Yes  Body mass index is 21.76 kg/m².   Health Habits/Nutrition Interventions:  · pt states weight loss was due to diverticulitis which pt was seen for in office     Hearing/Vision:  Hearing/Vision  Do you or your family notice any trouble with your hearing?: (!) Yes  Do you have difficulty driving, watching TV, or doing any of your daily activities because of your eyesight?: No  Have you had an eye exam within the past year?: (!) No  Hearing/Vision Interventions:  · Hearing concerns: patient declines any further evaluation/treatment for hearing issues    Safety:  Safety  Do you have working smoke detectors?: Yes  Have all throw rugs been removed or fastened?: (!) No  Do you have non-slip mats in all bathtubs?: Yes  Do all of your stairways have a railing or banister?: Yes  Are your doorways, halls and stairs free of clutter?: Yes  Do you always fasten your seatbelt when you are in a car?: (!) No  Safety Interventions:  · educated patient of safety of using seat belt at all times     Personalized Preventive Plan   Current Health Maintenance Status  Immunization History   Administered Date(s) Administered    Flu 18 Yrs Intradermal, Preservative Free 12/20/2012    Influenza, Intradermal, Preservative free 12/20/2012, 09/28/2015    Pneumococcal Conjugate 13-valent (Shelva Cellar) 04/02/2018    Tdap (Boostrix, Adacel) 03/30/2012        Health Maintenance   Topic Date Due    Shingles Vaccine (1 of 2) 04/21/2002    Colon cancer screen colonoscopy  09/10/2017    Pneumococcal 65+ years Vaccine (2 of 2 - PPSV23) 04/02/2019    Flu vaccine (Season Ended) 09/01/2019    Annual Wellness Visit (AWV)  06/18/2020    DTaP/Tdap/Td vaccine (2 - Td) 03/30/2022    Lipid screen  04/02/2023    AAA screen  Completed    Hepatitis C screen  Completed   Pneumococcal 23 vaccine given     Recommendations for Preventive Services Due: see orders and patient instructions/AVS.  . Recommended screening schedule for the next 5-10 years is provided to the patient in written form: see Patient Instructions/AVS.    Melvin Rosales LPN, 6/82/8104, performed the documented evaluation under the direct supervision of the attending physician. This encounter was performed under myELENI MDs, direct supervision, 6/19/2019.

## 2019-06-19 NOTE — PATIENT INSTRUCTIONS
Personalized Preventive Plan for Ivette Gallardo - 6/19/2019  Medicare offers a range of preventive health benefits. Some of the tests and screenings are paid in full while other may be subject to a deductible, co-insurance, and/or copay. Some of these benefits include a comprehensive review of your medical history including lifestyle, illnesses that may run in your family, and various assessments and screenings as appropriate. After reviewing your medical record and screening and assessments performed today your provider may have ordered immunizations, labs, imaging, and/or referrals for you. A list of these orders (if applicable) as well as your Preventive Care list are included within your After Visit Summary for your review. Other Preventive Recommendations:    · A preventive eye exam performed by an eye specialist is recommended every 1-2 years to screen for glaucoma; cataracts, macular degeneration, and other eye disorders. · A preventive dental visit is recommended every 6 months. · Try to get at least 150 minutes of exercise per week or 10,000 steps per day on a pedometer . · Order or download the FREE \"Exercise & Physical Activity: Your Everyday Guide\" from [employer]. Call 9-242.496.7303 or search [employer] online. · You need 5538-7549 mg of calcium and 4238-2235 IU of vitamin D per day. It is possible to meet your calcium requirement with diet alone, but a vitamin D supplement is usually necessary to meet this goal.  · When exposed to the sun, use a sunscreen that protects against both UVA and UVB radiation with an SPF of 30 or greater. Reapply every 2 to 3 hours or after sweating, drying off with a towel, or swimming. · Always wear a seat belt when traveling in a car. Always wear a helmet when riding a bicycle or motorcycle. Heart-Healthy Diet   Sodium, Fat, and Cholesterol Controlled Diet       What Is a Heart Healthy Diet?    A heart-healthy diet is one that limits sodium , certain types of fat , and cholesterol . This type of diet is recommended for:   People with any form of cardiovascular disease (eg, coronary heart disease , peripheral vascular disease , previous heart attack , previous stroke )   People with risk factors for cardiovascular disease, such as high blood pressure , high cholesterol , or diabetes   Anyone who wants to lower their risk of developing cardiovascular disease   Sodium    Sodium is a mineral found in many foods. In general, most people consume much more sodium than they need. Diets high in sodium can increase blood pressure and lead to edema (water retention). On a heart-healthy diet, you should consume no more than 2,300 mg (milligrams) of sodium per dayabout the amount in one teaspoon of table salt. The foods highest in sodium include table salt (about 50% sodium), processed foods, convenience foods, and preserved foods. Cholesterol    Cholesterol is a fat-like, waxy substance in your blood. Our bodies make some cholesterol. It is also found in animal products, with the highest amounts in fatty meat, egg yolks, whole milk, cheese, shellfish, and organ meats. On a heart-healthy diet, you should limit your cholesterol intake to less than 200 mg per day. It is normal and important to have some cholesterol in your bloodstream. But too much cholesterol can cause plaque to build up within your arteries, which can eventually lead to a heart attack or stroke. The two types of cholesterol that are most commonly referred to are:   Low-density lipoprotein (LDL) cholesterol  Also known as bad cholesterol, this is the cholesterol that tends to build up along your arteries. Bad cholesterol levels are increased by eating fats that are saturated or hydrogenated. Optimal level of this cholesterol is less than 100. Over 130 starts to get risky for heart disease.    High-density lipoprotein (HDL) cholesterol  Also known as good cholesterol, this type of cholesterol actually carries cholesterol away from your arteries and may, therefore, help lower your risk of having a heart attack. You want this level to be high (ideally greater than 60). It is a risk to have a level less than 40. You can raise this good cholesterol by eating olive oil, canola oil, avocados, or nuts. Exercise raises this level, too. Fat    Fat is calorie dense and packs a lot of calories into a small amount of food. Even though fats should be limited due to their high calorie content, not all fats are bad. In fact, some fats are quite healthful. Fat can be broken down into four main types. The good-for-you fats are:   Monounsaturated fat  found in oils such as olive and canola, avocados, and nuts and natural nut butters; can decrease cholesterol levels, while keeping levels of HDL cholesterol high   Polyunsaturated fat  found in oils such as safflower, sunflower, soybean, corn, and sesame; can decrease total cholesterol and LDL cholesterol   Omega-3 fatty acids  particularly those found in fatty fish (such as salmon, trout, tuna, mackerel, herring, and sardines); can decrease risk of arrhythmias, decrease triglyceride levels, and slightly lower blood pressure   The fats that you want to limit are:   Saturated fat  found in animal products, many fast foods, and a few vegetables; increases total blood cholesterol, including LDL levels   Animal fats that are saturated include: butter, lard, whole-milk dairy products, meat fat, and poultry skin   Vegetable fats that are saturated include: hydrogenated shortening, palm oil, coconut oil, cocoa butter   Hydrogenated or trans fat  found in margarine and vegetable shortening, most shelf stable snack foods, and fried foods; increases LDL and decreases HDL     It is generally recommended that you limit your total fat for the day to less than 30% of your total calories.  If you follow an 1800-calorie heart healthy diet, for example, this would mean 60 grams of fat or less per day. Saturated fat and trans fat in your diet raises your blood cholesterol the most, much more than dietary cholesterol does. For this reason, on a heart-healthy diet, less than 7% of your calories should come from saturated fat and ideally 0% from trans fat. On an 1800-calorie diet, this translates into less than 14 grams of saturated fat per day, leaving 46 grams of fat to come from mono- and polyunsaturated fats.    Food Choices on a Heart Healthy Diet   Food Category   Foods Recommended   Foods to Avoid   Grains   Breads and rolls without salted tops Most dry and cooked cereals Unsalted crackers and breadsticks Low-sodium or homemade breadcrumbs or stuffing All rice and pastas   Breads, rolls, and crackers with salted tops High-fat baked goods (eg, muffins, donuts, pastries) Quick breads, self-rising flour, and biscuit mixes Regular bread crumbs Instant hot cereals Commercially prepared rice, pasta, or stuffing mixes   Vegetables   Most fresh, frozen, and low-sodium canned vegetables Low-sodium and salt-free vegetable juices Canned vegetables if unsalted or rinsed   Regular canned vegetables and juices, including sauerkraut and pickled vegetables Frozen vegetables with sauces Commercially prepared potato and vegetable mixes   Fruits   Most fresh, frozen, and canned fruits All fruit juices   Fruits processed with salt or sodium   Milk   Nonfat or low-fat (1%) milk Nonfat or low-fat yogurt Cottage cheese, low-fat ricotta, cheeses labeled as low-fat and low-sodium   Whole milk Reduced-fat (2%) milk Malted and chocolate milk Full fat yogurt Most cheeses (unless low-fat and low salt) Buttermilk (no more than 1 cup per week)   Meats and Beans   Lean cuts of fresh or frozen beef, veal, lamb, or pork (look for the word loin) Fresh or frozen poultry without the skin Fresh or frozen fish and some shellfish Egg whites and egg substitutes (Limit whole eggs to three per week) Tofu Nuts or seeds (unsalted, dry-roasted), low-sodium peanut butter Dried peas, beans, and lentils   Any smoked, cured, salted, or canned meat, fish, or poultry (including pelayo, chipped beef, cold cuts, hot dogs, sausages, sardines, and anchovies) Poultry skins Breaded and/or fried fish or meats Canned peas, beans, and lentils Salted nuts   Fats and Oils   Olive oil and canola oil Low-sodium, low-fat salad dressings and mayonnaise   Butter, margarine, coconut and palm oils, pelayo fat   Snacks, Sweets, and Condiments   Low-sodium or unsalted versions of broths, soups, soy sauce, and condiments Pepper, herbs, and spices; vinegar, lemon, or lime juice Low-fat frozen desserts (yogurt, sherbet, fruit bars) Sugar, cocoa powder, honey, syrup, jam, and preserves Low-fat, trans-fat free cookies, cakes, and pies Cl and animal crackers, fig bars, pieter snaps   High-fat desserts Broth, soups, gravies, and sauces, made from instant mixes or other high-sodium ingredients Salted snack foods Canned olives Meat tenderizers, seasoning salt, and most flavored vinegars   Beverages   Low-sodium carbonated beverages Tea and coffee in moderation Soy milk   Commercially softened water   Suggestions   Make whole grains, fruits, and vegetables the base of your diet. Choose heart-healthy fats such as canola, olive, and flaxseed oil, and foods high in heart-healthy fats, such as nuts, seeds, soybeans, tofu, and fish. Eat fish at least twice per week; the fish highest in omega-3 fatty acids and lowest in mercury include salmon, herring, mackerel, sardines, and canned chunk light tuna. If you eat fish less than twice per week or have high triglycerides, talk to your doctor about taking fish oil supplements. Read food labels.    For products low in fat and cholesterol, look for fat free, low-fat, cholesterol free, saturated fat free, and trans fat freeAlso scan the Nutrition Facts Label, which lists saturated fat, trans fat, and cholesterol amounts. For products low in sodium, look for sodium free, very low sodium, low sodium, no added salt, and unsalted   Skip the salt when cooking or at the table; if food needs more flavor, get creative and try out different herbs and spices. Garlic and onion also add substantial flavor to foods. Trim any visible fat off meat and poultry before cooking, and drain the fat off after fierro. Use cooking methods that require little or no added fat, such as grilling, boiling, baking, poaching, broiling, roasting, steaming, stir-frying, and sauting. Avoid fast food and convenience food. They tend to be high in saturated and trans fat and have a lot of added salt. Talk to a registered dietitian for individualized diet advice. Last Reviewed: March 2011 Arua Cowan MS, MPH, RD   Updated: 3/29/2011   ·     Keep Your Memory [place]       Many factors can affect your ability to remembera hectic lifestyle, aging, stress, chronic disease, and certain medicines. But, there are steps you can take to sharpen your mind and help preserve your memory. Challenge Your Brain   Regularly challenging your mind may help keeps it in top shape. Good mental exercises include:   Crossword puzzlesUse a dictionary if you need it; you will learn more that way. Brainteasers Try some! Crafts, such as wood working and sewing   Hobbies, such as gardening and building model airplanes   SocializingVisit old friends or join groups to meet new ones. Reading   Learning a new language   Taking a class, whether it be art history or lissette chi   TravelingExperience the food, history, and culture of your destination   Learning to use a computer   Going to museums, the theater, or thought-provoking movies   Changing things in your daily life, such as reversing your pattern in the grocery store or brushing your teeth using your nondominant hand   Use Memory Aids   There is no need to remember every detail on your own.  These memory aids can help:   Calendars and day planners   Electronic organizers to store all sorts of helpful informationThese devices can \"beep\" to remind you of appointments. A book of days to record birthdays, anniversaries, and other occasions that occur on the same date every year   Detailed \"to-do\" lists and strategically placed sticky notes   Quick \"study\" sessionsBefore a gathering, review who will be there so their names will be fresh in your mind. Establish routinesFor example, keep your keys, wallet, and umbrella in the same place all the time or take medicine with your 8:00 AM glass of juice   Live a Healthy Life   Many actions that will keep your body strong will do the same for your mind. For example:   Talk to Your Doctor About Herbs and Supplements    Malnutrition and vitamin deficiencies can impair your mental function. For example, vitamin B12 deficiency can cause a range of symptoms, including confusion. But, what if your nutritional needs are being met? Can herbs and supplements still offer a benefit? Researchers have investigated a range of natural remedies, such as ginkgo , ginseng , and the supplement phosphatidylserine (PS). So far, though, the evidence is inconsistent as to whether these products can improve memory or thinking. If you are interested in taking herbs and supplements, talk to your doctor first because they may interact with other medicines that you are taking. Exercise Regularly    Among the many benefits of regular exercise are increased blood flow to the brain and decreased risk of certain diseases that can interfere with memory function. One study found that even moderate exercise has a beneficial effect. Examples of \"moderate\" exercise include:   Playing 18 holes of golf once a week, without a cart   Playing tennis twice a week   Walking one mile per day   Manage Stress    It can be tough to remember what is important when your mind is cluttered.  Make time for relaxation. Choose activities that calm you down, and make it routine. Manage Chronic Conditions    Side effects of high blood pressure , diabetes, and heart disease can interfere with mental function. Many of the lifestyle steps discussed here can help manage these conditions. Strive to eat a healthy diet, exercise regularly, get stress under control, and follow your doctor's advice for your condition. Minimize Medications    Talk to your doctor about the medicines that you take. Some may be unnecessary. Also, healthy lifestyle habits may lower the need for certain drugs. Last Reviewed: April 2010 Armani Ding MD   Updated: 4/13/2010   ·     [address]       As we get older, changes in balance, gait, strength, vision, hearing, and cognition make even the most youthful senior more prone to accidents. Falls are one of the leading health risks for older people. This increased risk of falling is related to:   Aging process (eg, decreased muscle strength, slowed reflexes)   Higher incidence of chronic health problems (eg, arthritis, diabetes) that may limit mobility, agility or sensory awareness   Side effects of medicine (eg, dizziness, blurred vision)especially medicines like prescription pain medicines and drugs used to treat mental health conditions   Depending on the brittleness of your bones, the consequences of a fall can be serious and long lasting. Home Life   Research by the Association of Aging [facility]) shows that some home accidents among older adults can be prevented by making simple lifestyle changes and basic modifications and repairs to the home environment. Here are some lifestyle changes that experts recommend:   Have your hearing and vision checked regularly. Be sure to wear prescription glasses that are right for you. Speak to your doctor or pharmacist about the possible side effects of your medicines. A number of medicines can cause dizziness.    If you have problems with mobility, bathtub transfer benches allow you to slide safely into the tub. Raised toilet seats and toilet safety rails are helpful for those with knee or hip problems. In the [facility]    Make sure you use a nightlight and that the area around your bed is clear of potential obstacles. Be careful with electric blankets and never go to sleep with a heating pad, which can cause serious burns even if on a low setting. Use fire-resistant mattress covers and pillows, and NEVER smoke in bed. Keep a phone next to the bed that is programmed to dial 911 at the push of a button. If you have a chronic condition, you may want to sign on with an automatic call-in service. Typically the system includes a small pendant that connects directly to an emergency medical voice-response system. You should also make arrangements to stay in contact with someonefriend, neighbor, family memberon a regular schedule. Fire Prevention   According to the [employer]. (Smoke Alarms for Every) [address] [place] [address], senior citizens are one of the two highest risk groups for death and serious injuries due to residential fires. When cooking, wear short-sleeved items, never a bulky long-sleeved robe. The [facility]'s Safety Checklist for Older Consumers emphasizes the importance of checking basements, garages, workshops and storage areas for fire hazards, such as volatile liquids, piles of old rags or clothing and overloaded circuits. Never smoke in bed or when lying down on a couch or recliner chair. Small portable electric or kerosene heaters are responsible for many home fires and should be used cautiously if at all. If you do use one, be sure to keep them away from flammable materials. In case of fire, make sure you have a pre-established emergency exit plan. Have a professional check your fireplace and other fuel-burning appliances yearly.     Helping Hands   Baby boomers entering the ayala years will continue to see the development of new products to help older adults live safely and independently in spite of age-related changes. Making Life More Livable  , by Franco [employer], lists over 1,000 products for \"living well in the mature years,\" such as bathing and mobility aids, household security devices, ergonomically designed knives and peelers, and faucet valves and knobs for temperature control. Medical supply stores and organizations are good sources of information about products that improve your quality of life and insure your safety.      Last Reviewed: November 2009 Genia Arenas MD   Updated: 3/7/2011     ·

## 2019-09-03 ENCOUNTER — OFFICE VISIT (OUTPATIENT)
Dept: FAMILY MEDICINE CLINIC | Age: 69
End: 2019-09-03
Payer: MEDICARE

## 2019-09-03 VITALS
TEMPERATURE: 98.1 F | SYSTOLIC BLOOD PRESSURE: 122 MMHG | BODY MASS INDEX: 21.62 KG/M2 | DIASTOLIC BLOOD PRESSURE: 78 MMHG | OXYGEN SATURATION: 99 % | HEART RATE: 79 BPM | WEIGHT: 155 LBS

## 2019-09-03 DIAGNOSIS — F32.A DEPRESSION, UNSPECIFIED DEPRESSION TYPE: ICD-10-CM

## 2019-09-03 DIAGNOSIS — R10.9 ABDOMINAL PAIN, UNSPECIFIED ABDOMINAL LOCATION: Primary | ICD-10-CM

## 2019-09-03 DIAGNOSIS — F31.81 BIPOLAR 2 DISORDER (HCC): ICD-10-CM

## 2019-09-03 PROCEDURE — 4040F PNEUMOC VAC/ADMIN/RCVD: CPT | Performed by: FAMILY MEDICINE

## 2019-09-03 PROCEDURE — 3017F COLORECTAL CA SCREEN DOC REV: CPT | Performed by: FAMILY MEDICINE

## 2019-09-03 PROCEDURE — 1036F TOBACCO NON-USER: CPT | Performed by: FAMILY MEDICINE

## 2019-09-03 PROCEDURE — 1123F ACP DISCUSS/DSCN MKR DOCD: CPT | Performed by: FAMILY MEDICINE

## 2019-09-03 PROCEDURE — G8420 CALC BMI NORM PARAMETERS: HCPCS | Performed by: FAMILY MEDICINE

## 2019-09-03 PROCEDURE — G8427 DOCREV CUR MEDS BY ELIG CLIN: HCPCS | Performed by: FAMILY MEDICINE

## 2019-09-03 PROCEDURE — 99214 OFFICE O/P EST MOD 30 MIN: CPT | Performed by: FAMILY MEDICINE

## 2019-09-03 ASSESSMENT — ENCOUNTER SYMPTOMS: DIARRHEA: 1

## 2019-09-03 NOTE — PROGRESS NOTES
9/3/2019     Ravi Horne (:  1950)is a 81 y.o. male, here for evaluation of the following medical concerns:    CC: abdominal pain    HPI     Abdominal pain  Felt worse 3 days ago, now improving, has had appendix out    Depression  Controlled, taking effexor as prescribed    Bipolar   Controlled, On lithium    Review of Systems   Constitutional: Negative for fever. Gastrointestinal: Positive for diarrhea. X1 day     Prior to Visit Medications    Medication Sig Taking? Authorizing Provider   VIAGRA 100 MG tablet TAKE ONE TABLET BY MOUTH DAILY AS NEEDED FOR ERECTILE DYSFUNCTION Yes Zuleyma Bradley MD   venlafaxine (EFFEXOR) 75 MG tablet Take 75 mg by mouth daily  Yes Historical Provider, MD   lithium 300 MG capsule Take 600 mg by mouth every evening  Yes Historical Provider, MD   cetirizine-psuedoephedrine (ZYRTEC-D) 5-120 MG per extended release tablet Take 1 tablet by mouth 2 times daily  Historical Provider, MD        Social History     Tobacco Use    Smoking status: Former Smoker     Packs/day: 1.00     Years: 12.00     Pack years: 12.00     Last attempt to quit: 2008     Years since quittin.6    Smokeless tobacco: Never Used   Substance Use Topics    Alcohol use: No     Alcohol/week: 20.0 standard drinks     Types: 20 Cans of beer per week     Comment: quit 2013        Vitals:    19 1428   BP: 122/78   Site: Left Upper Arm   Position: Sitting   Cuff Size: Medium Adult   Pulse: 79   Temp: 98.1 °F (36.7 °C)   SpO2: 99%   Weight: 155 lb (70.3 kg)     Estimated body mass index is 21.62 kg/m² as calculated from the following:    Height as of 19: 5' 11\" (1.803 m). Weight as of this encounter: 155 lb (70.3 kg). Physical Exam  Constitutional: appears well-developed and well-nourished. No distress. HENT:   Head: Normocephalic and atraumatic   Eyes: EOM are normal. Right eye exhibits no discharge.  Left eye exhibits no discharge   Pulmonary/Chest: Effort normal   Skin:

## 2019-09-10 ENCOUNTER — NURSE TRIAGE (OUTPATIENT)
Dept: OTHER | Facility: CLINIC | Age: 69
End: 2019-09-10

## 2019-09-11 ENCOUNTER — OFFICE VISIT (OUTPATIENT)
Dept: FAMILY MEDICINE CLINIC | Age: 69
End: 2019-09-11
Payer: MEDICARE

## 2019-09-11 ENCOUNTER — TELEPHONE (OUTPATIENT)
Dept: GASTROENTEROLOGY | Age: 69
End: 2019-09-11

## 2019-09-11 VITALS
BODY MASS INDEX: 21.42 KG/M2 | RESPIRATION RATE: 16 BRPM | HEART RATE: 78 BPM | DIASTOLIC BLOOD PRESSURE: 60 MMHG | SYSTOLIC BLOOD PRESSURE: 88 MMHG | OXYGEN SATURATION: 95 % | HEIGHT: 71 IN | WEIGHT: 153 LBS

## 2019-09-11 DIAGNOSIS — R07.9 CHEST PAIN, UNSPECIFIED TYPE: ICD-10-CM

## 2019-09-11 DIAGNOSIS — R10.13 EPIGASTRIC ABDOMINAL PAIN: Primary | ICD-10-CM

## 2019-09-11 DIAGNOSIS — K21.9 GASTROESOPHAGEAL REFLUX DISEASE, ESOPHAGITIS PRESENCE NOT SPECIFIED: ICD-10-CM

## 2019-09-11 DIAGNOSIS — R53.83 FATIGUE, UNSPECIFIED TYPE: ICD-10-CM

## 2019-09-11 DIAGNOSIS — R19.5 DARK STOOLS: ICD-10-CM

## 2019-09-11 PROCEDURE — 1123F ACP DISCUSS/DSCN MKR DOCD: CPT | Performed by: FAMILY MEDICINE

## 2019-09-11 PROCEDURE — 4040F PNEUMOC VAC/ADMIN/RCVD: CPT | Performed by: FAMILY MEDICINE

## 2019-09-11 PROCEDURE — 99214 OFFICE O/P EST MOD 30 MIN: CPT | Performed by: FAMILY MEDICINE

## 2019-09-11 PROCEDURE — G8420 CALC BMI NORM PARAMETERS: HCPCS | Performed by: FAMILY MEDICINE

## 2019-09-11 PROCEDURE — 36415 COLL VENOUS BLD VENIPUNCTURE: CPT | Performed by: FAMILY MEDICINE

## 2019-09-11 PROCEDURE — 1036F TOBACCO NON-USER: CPT | Performed by: FAMILY MEDICINE

## 2019-09-11 PROCEDURE — 3017F COLORECTAL CA SCREEN DOC REV: CPT | Performed by: FAMILY MEDICINE

## 2019-09-11 PROCEDURE — G8427 DOCREV CUR MEDS BY ELIG CLIN: HCPCS | Performed by: FAMILY MEDICINE

## 2019-09-11 PROCEDURE — 93000 ELECTROCARDIOGRAM COMPLETE: CPT | Performed by: FAMILY MEDICINE

## 2019-09-11 ASSESSMENT — ENCOUNTER SYMPTOMS
ABDOMINAL PAIN: 1
DIARRHEA: 0
VOMITING: 0
ANAL BLEEDING: 0
SHORTNESS OF BREATH: 0
NAUSEA: 0

## 2019-09-11 NOTE — PROGRESS NOTES
Patient: Gissell Nugent is a 81 y.o.male who presents today with the following Chief Complaint(s):  Chief Complaint   Patient presents with    Abdominal Pain     hurts to swallow/eat, loud hiccups, sharp pain in abdomen above belly button, no appetite, coughing up blood (sometimes with clots)         HPI: Pt first noted epigastric pain 2 wk ago while fishing and attempting to reel in large fish. Epigastric pain initially radiated into L shoudler. Pain in shoulder resolved in 30 min but later in evening, again felt L shoulder pain x few hrs. Suspects he strained arm as fishing hook was caught on something in [address] [facility], though he wonders about MI. No n/v/sob/diaphoresis noted. [place], o/w ate as usual. No one became ill.    7 days ago, noted epigastric pain again. Pain is intensified with swallowing. Feels as if swallowing causes something to pull under sternum. Had loud, painful hiccups 9/8/19 at hs. Pain was felt in epigastrium. When awoke in mid of noc to get up to void, pt had liquid in mouth that he spit out, noted to contain dark blood. Denies cough, sob, fever. No uri sx's. Has been tired, falling asleep when not wanting to. 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 Instead of emesis had large bm. Today, has had 2 BMs, both formed and black. Has had on/off black stool in recent mo's. Took Pepto Bismol last pm but not prior to on/off dark stools. Took prilosec 1 wk ago x 2 days, not sure if helpful for epigastric pain. Dentist told pt teeth have signs of reflux. 3 wk ago, had RLQ pain. Saw Dr Ford Walsh 9/3/19 as RLQ pain persisted, eventually resolved and w/u was not ordered.       Current Outpatient Medications   Medication Sig Dispense Refill    VIAGRA 100 MG tablet TAKE ONE TABLET BY MOUTH DAILY AS NEEDED FOR ERECTILE DYSFUNCTION 6 tablet 3    cetirizine-psuedoephedrine (ZYRTEC-D) 5-120 MG per extended release

## 2019-09-11 NOTE — TELEPHONE ENCOUNTER
[phone] (home)     Left message for pt to call back.  Per Dr. Usama Weaver can be scheduled on 9/13 @ 12:45 PM.

## 2019-09-12 LAB
A/G RATIO: 1.8 (ref 1.1–2.2)
ALBUMIN SERPL-MCNC: 4.2 G/DL (ref 3.4–5)
ALP BLD-CCNC: 73 U/L (ref 40–129)
ALT SERPL-CCNC: 15 U/L (ref 10–40)
ANION GAP SERPL CALCULATED.3IONS-SCNC: 15 MMOL/L (ref 3–16)
AST SERPL-CCNC: 23 U/L (ref 15–37)
BILIRUB SERPL-MCNC: 0.3 MG/DL (ref 0–1)
BUN BLDV-MCNC: 17 MG/DL (ref 7–20)
CALCIUM SERPL-MCNC: 9.6 MG/DL (ref 8.3–10.6)
CHLORIDE BLD-SCNC: 100 MMOL/L (ref 99–110)
CO2: 26 MMOL/L (ref 21–32)
CREAT SERPL-MCNC: 0.8 MG/DL (ref 0.8–1.3)
GFR AFRICAN AMERICAN: >60
GFR NON-AFRICAN AMERICAN: >60
GLOBULIN: 2.4 G/DL
GLUCOSE BLD-MCNC: 97 MG/DL (ref 70–99)
HCT VFR BLD CALC: 38.8 % (ref 40.5–52.5)
HEMOGLOBIN: 12.9 G/DL (ref 13.5–17.5)
LIPASE: 56 U/L (ref 13–60)
MCH RBC QN AUTO: 32 PG (ref 26–34)
MCHC RBC AUTO-ENTMCNC: 33.2 G/DL (ref 31–36)
MCV RBC AUTO: 96.5 FL (ref 80–100)
PDW BLD-RTO: 14 % (ref 12.4–15.4)
PLATELET # BLD: 387 K/UL (ref 135–450)
PMV BLD AUTO: 7.8 FL (ref 5–10.5)
POTASSIUM SERPL-SCNC: 5.1 MMOL/L (ref 3.5–5.1)
RBC # BLD: 4.02 M/UL (ref 4.2–5.9)
SODIUM BLD-SCNC: 141 MMOL/L (ref 136–145)
TOTAL PROTEIN: 6.6 G/DL (ref 6.4–8.2)
WBC # BLD: 8.7 K/UL (ref 4–11)

## 2019-09-13 ENCOUNTER — INITIAL CONSULT (OUTPATIENT)
Dept: GASTROENTEROLOGY | Age: 69
End: 2019-09-13
Payer: MEDICARE

## 2019-09-13 VITALS
HEIGHT: 71 IN | BODY MASS INDEX: 21.98 KG/M2 | WEIGHT: 157 LBS | DIASTOLIC BLOOD PRESSURE: 80 MMHG | SYSTOLIC BLOOD PRESSURE: 128 MMHG

## 2019-09-13 DIAGNOSIS — R10.13 EPIGASTRIC PAIN: ICD-10-CM

## 2019-09-13 DIAGNOSIS — K92.1 MELENA: Primary | ICD-10-CM

## 2019-09-13 DIAGNOSIS — D64.9 NORMOCYTIC ANEMIA: ICD-10-CM

## 2019-09-13 PROCEDURE — 3017F COLORECTAL CA SCREEN DOC REV: CPT | Performed by: INTERNAL MEDICINE

## 2019-09-13 PROCEDURE — 4040F PNEUMOC VAC/ADMIN/RCVD: CPT | Performed by: INTERNAL MEDICINE

## 2019-09-13 PROCEDURE — 99204 OFFICE O/P NEW MOD 45 MIN: CPT | Performed by: INTERNAL MEDICINE

## 2019-09-13 PROCEDURE — G8420 CALC BMI NORM PARAMETERS: HCPCS | Performed by: INTERNAL MEDICINE

## 2019-09-13 PROCEDURE — G8427 DOCREV CUR MEDS BY ELIG CLIN: HCPCS | Performed by: INTERNAL MEDICINE

## 2019-09-13 PROCEDURE — 1123F ACP DISCUSS/DSCN MKR DOCD: CPT | Performed by: INTERNAL MEDICINE

## 2019-09-13 PROCEDURE — 1036F TOBACCO NON-USER: CPT | Performed by: INTERNAL MEDICINE

## 2019-09-13 RX ORDER — TRAMADOL HYDROCHLORIDE 50 MG/1
50 TABLET ORAL EVERY 8 HOURS PRN
Qty: 15 TABLET | Refills: 0 | Status: SHIPPED | OUTPATIENT
Start: 2019-09-13 | End: 2019-09-18

## 2019-09-13 RX ORDER — OMEPRAZOLE 20 MG/1
20 CAPSULE, DELAYED RELEASE ORAL
Qty: 90 CAPSULE | Refills: 1 | Status: ON HOLD | OUTPATIENT
Start: 2019-09-13 | End: 2019-09-24 | Stop reason: SDUPTHER

## 2019-09-13 NOTE — PROGRESS NOTES
Sister     Mental Illness Brother         depression    Arthritis Brother     Thyroid Disease Brother     Cancer Paternal Aunt         ovarian    Cancer Paternal Grandfather         lymphoma    Cancer Paternal Aunt     Diabetes Neg Hx      SOCIAL HISTORY     Social History     Socioeconomic History    Marital status:      Spouse name: Not on file    Number of children: Not on file    Years of education: Not on file    Highest education level: Not on file   Occupational History    Not on file   Social Needs    Financial resource strain: Not on file    Food insecurity:     Worry: Not on file     Inability: Not on file    Transportation needs:     Medical: Not on file     Non-medical: Not on file   Tobacco Use    Smoking status: Former Smoker     Packs/day: 1.00     Years: 12.00     Pack years: 12.00     Last attempt to quit: 2008     Years since quittin.7    Smokeless tobacco: Never Used   Substance and Sexual Activity    Alcohol use: No     Alcohol/week: 20.0 standard drinks     Types: 20 Cans of beer per week     Comment: quit     Drug use: No     Comment: quit betty     Sexual activity: Yes     Partners: Female   Lifestyle    Physical activity:     Days per week: Not on file     Minutes per session: Not on file    Stress: Not on file   Relationships    Social connections:     Talks on phone: Not on file     Gets together: Not on file     Attends [employer] service: Not on file     Active member of club or organization: Not on file     Attends meetings of clubs or organizations: Not on file     Relationship status: Not on file    Intimate partner violence:     Fear of current or ex partner: Not on file     Emotionally abused: Not on file     Physically abused: Not on file     Forced sexual activity: Not on file   Other Topics Concern    Not on file   Social History Narrative    Not on file     SURGICAL HISTORY     Past Surgical History:   Procedure Laterality Date Mahin Cedken APPENDECTOMY  age 27    COLONOSCOPY  2007    HERNIA REPAIR  age 5 and 6    x2    LASIK  2006    PROSTATE BIOPSY       CURRENT MEDICATIONS   (This list may include medications prescribed during this encounter as epic can not insert only the list prior to this encounter.)  Current Outpatient Rx   Medication Sig Dispense Refill    VIAGRA 100 MG tablet TAKE ONE TABLET BY MOUTH DAILY AS NEEDED FOR ERECTILE DYSFUNCTION 6 tablet 3    cetirizine-psuedoephedrine (ZYRTEC-D) 5-120 MG per extended release tablet Take 1 tablet by mouth 2 times daily      venlafaxine (EFFEXOR) 75 MG tablet Take 75 mg by mouth daily       lithium 300 MG capsule Take 600 mg by mouth every evening         ALLERGIES   No Known Allergies  IMMUNIZATIONS     Immunization History   Administered Date(s) Administered    Flu 18 Yrs Intradermal, Preservative Free 12/20/2012    Influenza, Intradermal, Preservative free 12/20/2012, 09/28/2015    Pneumococcal Conjugate 13-valent ([id number]) 04/02/2018    Pneumococcal Polysaccharide ([id number]) 06/19/2019    Tdap (Boostrix, Adacel) 03/30/2012     REVIEW OF SYSTEMS     Constitutional: denies fever and unexpected weight change. HENT: Negative for ear pain, hearing loss and nosebleeds. Eyes: Negative for pain and visual disturbance. Respiratory: Negative for cough, shortness of breath and wheezing. Cardiovascular: Negative for chest pain, palpitations and leg swelling. Gastrointestinal: see HPI for details. Endocrine: Negative for polydipsia, polyphagia and polyuria. Genitourinary: Negative for difficulty urinating, dysuria, hematuria and urgency. Musculoskeletal: Positive for arthralgias and back pain. Skin: Negative for pallor and rash. Allergic/Immunologic: Negative for environmental allergies and immunocompromised state. Neurological: Negative for seizures, syncope. Hematological: Negative for adenopathy. Does not bruise/bleed easily.    Psychiatric/Behavioral:

## 2019-09-24 ENCOUNTER — HOSPITAL ENCOUNTER (OUTPATIENT)
Age: 69
Setting detail: OUTPATIENT SURGERY
Discharge: HOME | End: 2019-09-24
Attending: INTERNAL MEDICINE | Admitting: INTERNAL MEDICINE
Payer: MEDICARE

## 2019-09-24 ENCOUNTER — TELEPHONE (OUTPATIENT)
Dept: GASTROENTEROLOGY | Age: 69
End: 2019-09-24

## 2019-09-24 VITALS
HEIGHT: 71 IN | DIASTOLIC BLOOD PRESSURE: 62 MMHG | BODY MASS INDEX: 21 KG/M2 | WEIGHT: 150 LBS | OXYGEN SATURATION: 98 % | SYSTOLIC BLOOD PRESSURE: 108 MMHG | TEMPERATURE: 97.4 F | HEART RATE: 77 BPM | RESPIRATION RATE: 18 BRPM

## 2019-09-24 PROBLEM — K22.10 EROSIVE ESOPHAGITIS: Status: ACTIVE | Noted: 2019-09-24

## 2019-09-24 PROBLEM — K44.9 HIATAL HERNIA: Status: ACTIVE | Noted: 2019-09-24

## 2019-09-24 PROCEDURE — 2580000003 HC RX 258: Performed by: INTERNAL MEDICINE

## 2019-09-24 PROCEDURE — 7100000010 HC PHASE II RECOVERY - FIRST 15 MIN: Performed by: INTERNAL MEDICINE

## 2019-09-24 PROCEDURE — 2709999900 HC NON-CHARGEABLE SUPPLY: Performed by: INTERNAL MEDICINE

## 2019-09-24 PROCEDURE — 3609017100 HC EGD: Performed by: INTERNAL MEDICINE

## 2019-09-24 PROCEDURE — 43235 EGD DIAGNOSTIC BRUSH WASH: CPT | Performed by: INTERNAL MEDICINE

## 2019-09-24 PROCEDURE — 99152 MOD SED SAME PHYS/QHP 5/>YRS: CPT | Performed by: INTERNAL MEDICINE

## 2019-09-24 PROCEDURE — 7100000011 HC PHASE II RECOVERY - ADDTL 15 MIN: Performed by: INTERNAL MEDICINE

## 2019-09-24 PROCEDURE — 6360000002 HC RX W HCPCS: Performed by: INTERNAL MEDICINE

## 2019-09-24 PROCEDURE — 99152 MOD SED SAME PHYS/QHP 5/>YRS: CPT

## 2019-09-24 RX ORDER — FENTANYL CITRATE 50 UG/ML
INJECTION, SOLUTION INTRAMUSCULAR; INTRAVENOUS PRN
Status: DISCONTINUED | OUTPATIENT
Start: 2019-09-24 | End: 2019-09-24 | Stop reason: ALTCHOICE

## 2019-09-24 RX ORDER — OMEPRAZOLE 20 MG/1
20 CAPSULE, DELAYED RELEASE ORAL
Qty: 180 CAPSULE | Refills: 1 | Status: SHIPPED | OUTPATIENT
Start: 2019-09-24 | End: 2020-05-04

## 2019-09-24 RX ORDER — MIDAZOLAM HYDROCHLORIDE 5 MG/ML
INJECTION INTRAMUSCULAR; INTRAVENOUS PRN
Status: DISCONTINUED | OUTPATIENT
Start: 2019-09-24 | End: 2019-09-24 | Stop reason: ALTCHOICE

## 2019-09-24 RX ORDER — SODIUM CHLORIDE 9 MG/ML
INJECTION, SOLUTION INTRAVENOUS CONTINUOUS
Status: DISCONTINUED | OUTPATIENT
Start: 2019-09-24 | End: 2019-09-24 | Stop reason: HOSPADM

## 2019-09-24 RX ADMIN — SODIUM CHLORIDE: 9 INJECTION, SOLUTION INTRAVENOUS at 08:22

## 2019-09-24 ASSESSMENT — PAIN - FUNCTIONAL ASSESSMENT: PAIN_FUNCTIONAL_ASSESSMENT: 0-10

## 2019-09-24 ASSESSMENT — PAIN SCALES - GENERAL: PAINLEVEL_OUTOF10: 0

## 2019-10-16 ENCOUNTER — TELEPHONE (OUTPATIENT)
Dept: GASTROENTEROLOGY | Age: 69
End: 2019-10-16

## 2019-10-16 DIAGNOSIS — Z12.11 SCREENING FOR COLON CANCER: Primary | ICD-10-CM

## 2019-10-16 RX ORDER — POLYETHYLENE GLYCOL 3350 17 G/17G
POWDER, FOR SOLUTION ORAL
Qty: 238 G | Refills: 0 | Status: ON HOLD | OUTPATIENT
Start: 2019-10-16 | End: 2019-12-03 | Stop reason: HOSPADM

## 2019-11-25 ENCOUNTER — OFFICE VISIT (OUTPATIENT)
Dept: FAMILY MEDICINE CLINIC | Age: 69
End: 2019-11-25
Payer: MEDICARE

## 2019-11-25 VITALS
WEIGHT: 154 LBS | DIASTOLIC BLOOD PRESSURE: 76 MMHG | OXYGEN SATURATION: 98 % | RESPIRATION RATE: 16 BRPM | SYSTOLIC BLOOD PRESSURE: 106 MMHG | BODY MASS INDEX: 21.48 KG/M2 | HEART RATE: 79 BPM

## 2019-11-25 DIAGNOSIS — R30.0 DYSURIA: Primary | ICD-10-CM

## 2019-11-25 DIAGNOSIS — K20.90 ESOPHAGITIS: ICD-10-CM

## 2019-11-25 LAB
BILIRUBIN, POC: NORMAL
BLOOD URINE, POC: NORMAL
CLARITY, POC: NORMAL
COLOR, POC: YELLOW
GLUCOSE URINE, POC: NORMAL
KETONES, POC: NORMAL
LEUKOCYTE EST, POC: NORMAL
NITRITE, POC: NORMAL
PH, POC: 6.5
PROTEIN, POC: NORMAL
SPECIFIC GRAVITY, POC: 1.01
UROBILINOGEN, POC: NORMAL

## 2019-11-25 PROCEDURE — 1036F TOBACCO NON-USER: CPT | Performed by: FAMILY MEDICINE

## 2019-11-25 PROCEDURE — 4040F PNEUMOC VAC/ADMIN/RCVD: CPT | Performed by: FAMILY MEDICINE

## 2019-11-25 PROCEDURE — G8484 FLU IMMUNIZE NO ADMIN: HCPCS | Performed by: FAMILY MEDICINE

## 2019-11-25 PROCEDURE — G8420 CALC BMI NORM PARAMETERS: HCPCS | Performed by: FAMILY MEDICINE

## 2019-11-25 PROCEDURE — 3017F COLORECTAL CA SCREEN DOC REV: CPT | Performed by: FAMILY MEDICINE

## 2019-11-25 PROCEDURE — 81002 URINALYSIS NONAUTO W/O SCOPE: CPT | Performed by: FAMILY MEDICINE

## 2019-11-25 PROCEDURE — 99213 OFFICE O/P EST LOW 20 MIN: CPT | Performed by: FAMILY MEDICINE

## 2019-11-25 PROCEDURE — 1123F ACP DISCUSS/DSCN MKR DOCD: CPT | Performed by: FAMILY MEDICINE

## 2019-11-25 PROCEDURE — G8427 DOCREV CUR MEDS BY ELIG CLIN: HCPCS | Performed by: FAMILY MEDICINE

## 2019-11-25 RX ORDER — SULFAMETHOXAZOLE AND TRIMETHOPRIM 800; 160 MG/1; MG/1
1 TABLET ORAL 2 TIMES DAILY
Qty: 20 TABLET | Refills: 0 | Status: SHIPPED | OUTPATIENT
Start: 2019-11-25 | End: 2020-07-09

## 2019-11-26 LAB — URINE CULTURE, ROUTINE: NORMAL

## 2019-11-26 ASSESSMENT — ENCOUNTER SYMPTOMS
BACK PAIN: 0
ABDOMINAL PAIN: 0

## 2019-12-03 ENCOUNTER — HOSPITAL ENCOUNTER (OUTPATIENT)
Age: 69
Setting detail: OUTPATIENT SURGERY
Discharge: HOME | End: 2019-12-03
Attending: INTERNAL MEDICINE | Admitting: INTERNAL MEDICINE
Payer: MEDICARE

## 2019-12-03 VITALS
TEMPERATURE: 97.4 F | OXYGEN SATURATION: 99 % | HEART RATE: 59 BPM | DIASTOLIC BLOOD PRESSURE: 70 MMHG | HEIGHT: 71 IN | RESPIRATION RATE: 18 BRPM | SYSTOLIC BLOOD PRESSURE: 144 MMHG | BODY MASS INDEX: 21 KG/M2 | WEIGHT: 150 LBS

## 2019-12-03 DIAGNOSIS — Z12.11 COLON CANCER SCREENING: ICD-10-CM

## 2019-12-03 DIAGNOSIS — K92.1 MELENA: ICD-10-CM

## 2019-12-03 DIAGNOSIS — R10.13 EPIGASTRIC PAIN: ICD-10-CM

## 2019-12-03 PROBLEM — K57.30 DIVERTICULOSIS OF COLON: Status: ACTIVE | Noted: 2019-12-03

## 2019-12-03 PROBLEM — K22.70 BARRETT'S ESOPHAGUS DETERMINED BY ENDOSCOPY: Status: ACTIVE | Noted: 2019-12-03

## 2019-12-03 PROCEDURE — 99152 MOD SED SAME PHYS/QHP 5/>YRS: CPT | Performed by: INTERNAL MEDICINE

## 2019-12-03 PROCEDURE — 3609027000 HC COLONOSCOPY: Performed by: INTERNAL MEDICINE

## 2019-12-03 PROCEDURE — 7100000011 HC PHASE II RECOVERY - ADDTL 15 MIN: Performed by: INTERNAL MEDICINE

## 2019-12-03 PROCEDURE — 3609017100 HC EGD: Performed by: INTERNAL MEDICINE

## 2019-12-03 PROCEDURE — 88305 TISSUE EXAM BY PATHOLOGIST: CPT

## 2019-12-03 PROCEDURE — 43239 EGD BIOPSY SINGLE/MULTIPLE: CPT | Performed by: INTERNAL MEDICINE

## 2019-12-03 PROCEDURE — 99153 MOD SED SAME PHYS/QHP EA: CPT | Performed by: INTERNAL MEDICINE

## 2019-12-03 PROCEDURE — 7100000010 HC PHASE II RECOVERY - FIRST 15 MIN: Performed by: INTERNAL MEDICINE

## 2019-12-03 PROCEDURE — G0105 COLORECTAL SCRN; HI RISK IND: HCPCS | Performed by: INTERNAL MEDICINE

## 2019-12-03 PROCEDURE — 2580000003 HC RX 258: Performed by: INTERNAL MEDICINE

## 2019-12-03 PROCEDURE — 6360000002 HC RX W HCPCS: Performed by: INTERNAL MEDICINE

## 2019-12-03 PROCEDURE — 2709999900 HC NON-CHARGEABLE SUPPLY: Performed by: INTERNAL MEDICINE

## 2019-12-03 RX ORDER — SODIUM CHLORIDE, SODIUM LACTATE, POTASSIUM CHLORIDE, CALCIUM CHLORIDE 600; 310; 30; 20 MG/100ML; MG/100ML; MG/100ML; MG/100ML
INJECTION, SOLUTION INTRAVENOUS ONCE
Status: COMPLETED | OUTPATIENT
Start: 2019-12-03 | End: 2019-12-03

## 2019-12-03 RX ORDER — MIDAZOLAM HYDROCHLORIDE 5 MG/ML
INJECTION INTRAMUSCULAR; INTRAVENOUS PRN
Status: DISCONTINUED | OUTPATIENT
Start: 2019-12-03 | End: 2019-12-03 | Stop reason: ALTCHOICE

## 2019-12-03 RX ORDER — FENTANYL CITRATE 50 UG/ML
INJECTION, SOLUTION INTRAMUSCULAR; INTRAVENOUS PRN
Status: DISCONTINUED | OUTPATIENT
Start: 2019-12-03 | End: 2019-12-03 | Stop reason: ALTCHOICE

## 2019-12-03 RX ADMIN — SODIUM CHLORIDE, POTASSIUM CHLORIDE, SODIUM LACTATE AND CALCIUM CHLORIDE: 600; 310; 30; 20 INJECTION, SOLUTION INTRAVENOUS at 08:22

## 2019-12-03 ASSESSMENT — PAIN - FUNCTIONAL ASSESSMENT: PAIN_FUNCTIONAL_ASSESSMENT: 0-10

## 2019-12-03 ASSESSMENT — PAIN SCALES - GENERAL: PAINLEVEL_OUTOF10: 0

## 2019-12-04 DIAGNOSIS — G47.00 INSOMNIA, UNSPECIFIED TYPE: ICD-10-CM

## 2019-12-04 RX ORDER — ZOLPIDEM TARTRATE 10 MG/1
TABLET ORAL
Qty: 30 TABLET | Refills: 4 | Status: SHIPPED | OUTPATIENT
Start: 2019-12-04 | End: 2020-01-03

## 2019-12-05 ENCOUNTER — TELEPHONE (OUTPATIENT)
Dept: GASTROENTEROLOGY | Age: 69
End: 2019-12-05

## 2019-12-10 ENCOUNTER — NURSE ONLY (OUTPATIENT)
Dept: FAMILY MEDICINE CLINIC | Age: 69
End: 2019-12-10

## 2020-01-02 PROBLEM — Z12.11 COLON CANCER SCREENING: Status: RESOLVED | Noted: 2019-12-03 | Resolved: 2020-01-02

## 2020-02-04 ENCOUNTER — NURSE ONLY (OUTPATIENT)
Dept: FAMILY MEDICINE CLINIC | Age: 70
End: 2020-02-04
Payer: MEDICARE

## 2020-02-04 PROCEDURE — 90653 IIV ADJUVANT VACCINE IM: CPT | Performed by: FAMILY MEDICINE

## 2020-02-04 PROCEDURE — G0008 ADMIN INFLUENZA VIRUS VAC: HCPCS | Performed by: FAMILY MEDICINE

## 2020-02-04 NOTE — PROGRESS NOTES
Vaccine Information Sheet, \"Influenza - Inactivated\"  given to [employer], or parent/legal guardian of  [employer] and verbalized understanding. Patient responses:    Have you ever had a reaction to a flu vaccine? No  Do you have any current illness? No  Have you ever had Guillian [place] Syndrome? No  Do you have a serious allergy to any of the follow: Neomycin, Polymyxin, Thimerosal, eggs or egg products? No    Flu vaccine given per order. Please see immunization tab. Risks and benefits explained. Current VIS given.

## 2020-02-21 RX ORDER — SILDENAFIL 100 MG/1
TABLET, FILM COATED ORAL
Qty: 6 TABLET | Refills: 2 | Status: SHIPPED | OUTPATIENT
Start: 2020-02-21 | End: 2021-06-01

## 2020-05-04 RX ORDER — OMEPRAZOLE 20 MG/1
CAPSULE, DELAYED RELEASE ORAL
Qty: 180 CAPSULE | Refills: 2 | Status: SHIPPED | OUTPATIENT
Start: 2020-05-04 | End: 2021-02-04 | Stop reason: SDUPTHER

## 2020-05-18 ENCOUNTER — PATIENT MESSAGE (OUTPATIENT)
Dept: FAMILY MEDICINE CLINIC | Age: 70
End: 2020-05-18

## 2020-05-18 NOTE — TELEPHONE ENCOUNTER
From: [facility]  To: Kellie Rick MD  Sent: 5/18/2020 3:42 PM EDT  Subject: Non-Urgent Melissa Senters Dr. Lulu Renee - I hope you and your family are doing well. My question: I have had intense ringing in my ears for about two weeks now . I have had tinnitus for years but it reached a new level recently. I have also had symptoms of either a slight cold or the usual spring allergies with nasal congestion, a mild brief sore throat, and mild chest congestion. The congestion has pretty much passed but I have fatigue throughout the day. My head at times has pressure and that floaty feeling, I feel like I'm walking through water. My hearing is also gotten pretty bad. I have been taking Zyrtec D - two 12 hour tabs a day. I have been waiting this out and hoping it passes with the allergy season. Any suggestions ?

## 2020-07-09 ENCOUNTER — VIRTUAL VISIT (OUTPATIENT)
Dept: FAMILY MEDICINE CLINIC | Age: 70
End: 2020-07-09
Payer: MEDICARE

## 2020-07-09 PROCEDURE — 3017F COLORECTAL CA SCREEN DOC REV: CPT | Performed by: FAMILY MEDICINE

## 2020-07-09 PROCEDURE — 99213 OFFICE O/P EST LOW 20 MIN: CPT | Performed by: FAMILY MEDICINE

## 2020-07-09 PROCEDURE — 4040F PNEUMOC VAC/ADMIN/RCVD: CPT | Performed by: FAMILY MEDICINE

## 2020-07-09 PROCEDURE — G8427 DOCREV CUR MEDS BY ELIG CLIN: HCPCS | Performed by: FAMILY MEDICINE

## 2020-07-09 PROCEDURE — 1123F ACP DISCUSS/DSCN MKR DOCD: CPT | Performed by: FAMILY MEDICINE

## 2020-07-09 RX ORDER — ZOLPIDEM TARTRATE 10 MG/1
TABLET ORAL
Qty: 30 TABLET | Refills: 4 | Status: CANCELLED | OUTPATIENT
Start: 2020-07-09 | End: 2020-08-08

## 2020-07-09 RX ORDER — ZOLPIDEM TARTRATE 5 MG/1
5 TABLET ORAL NIGHTLY PRN
Qty: 30 TABLET | Refills: 5 | Status: SHIPPED | OUTPATIENT
Start: 2020-07-09 | End: 2021-01-25 | Stop reason: SDUPTHER

## 2020-07-09 ASSESSMENT — ENCOUNTER SYMPTOMS
BLOOD IN STOOL: 0
ABDOMINAL PAIN: 0

## 2020-07-09 NOTE — PROGRESS NOTES
2020    TELEHEALTH EVALUATION -- Audio/Visual (During IZJZC-20 public health emergency)    HPI:    Livier Lagos (:  1950) has requested an audio/video evaluation for the following concern(s):    Pt with hld, bpd (Dr Rochelle Jay), chronic insomnia, erosive esophagitis  (Dr Svitlana Rain) had repeat EGD 19 and Barretts esoph was found. Pt conts to avoid etoh since . Conts bid omep 20 and is minimizing the 4 Cs (caffeine, citrus, chocolate, carbonation). No epigastric pain. Usually takes [place] 10 1/2 qhs, is interested in further wean. Review of Systems   Constitutional: Negative for activity change, appetite change, fatigue and fever. Gastrointestinal: Negative for abdominal pain and blood in stool. Psychiatric/Behavioral: Positive for sleep disturbance. Negative for dysphoric mood. The patient is not nervous/anxious. Prior to Visit Medications    Medication Sig Taking? Authorizing Provider   zolpidem (AMBIEN) 5 MG tablet Take 1 tablet by mouth nightly as needed for Sleep for up to 30 days.  Yes Tara Roblero MD   omeprazole (PRILOSEC) 20 MG delayed release capsule TAKE ONE CAPSULE BY MOUTH TWICE A DAY BEFORE Marii Coffman MD   sildenafil (VIAGRA) 100 MG tablet TAKE ONE TABLET BY MOUTH DAILY AS NEEDED FOR ERECTILE DYSFUNCTION  Alberto Re, APRN - CNP   cetirizine-psuedoephedrine (ZYRTEC-D) 5-120 MG per extended release tablet Take 1 tablet by mouth 2 times daily  Historical Provider, MD   venlafaxine (EFFEXOR) 75 MG tablet Take 75 mg by mouth daily   Historical Provider, MD   lithium 300 MG capsule Take 600 mg by mouth every evening   Historical Provider, MD       Social History     Tobacco Use    Smoking status: Former Smoker     Packs/day: 1.00     Years: 12.00     Pack years: 12.00     Last attempt to quit: 2008     Years since quittin.5    Smokeless tobacco: Never Used   Substance Use Topics    Alcohol use: No     Alcohol/week: 20.0 standard routine office visit. Cesario Loera is a 78 y.o. male being evaluated by a Virtual Visit (video visit) encounter to address concerns as mentioned above. A caregiver was present when appropriate. Due to this being a TeleHealth encounter (During FMASX-46 public health emergency), evaluation of the following organ systems was limited: Vitals/Constitutional/EENT/Resp/CV/GI//MS/Neuro/Skin/Heme-Lymph-Imm. Pursuant to the emergency declaration under the [address] [place] [address], [address] [place] [address] authority and the [employer] Act, this Virtual Visit was conducted with patient's (and/or legal guardian's) consent, to reduce the patient's risk of exposure to COVID-19 and provide necessary medical care. The patient (and/or legal guardian) has also been advised to contact this office for worsening conditions or problems, and seek emergency medical treatment and/or call 911 if deemed necessary. Patient identification was verified at the start of the visit: Yes    Total time spent on this encounter: Not billed by time    Services were provided through a video synchronous discussion virtually to substitute for in-person clinic visit. Patient and provider were located at their individual homes. --ELENI GURROLA MD on 7/9/2020 at 3:27 PM    An electronic signature was used to authenticate this note.

## 2020-10-01 ENCOUNTER — NUTRITION (OUTPATIENT)
Dept: FAMILY MEDICINE CLINIC | Age: 70
End: 2020-10-01
Payer: MEDICARE

## 2020-10-01 VITALS
BODY MASS INDEX: 21.9 KG/M2 | WEIGHT: 157 LBS | HEART RATE: 68 BPM | DIASTOLIC BLOOD PRESSURE: 60 MMHG | SYSTOLIC BLOOD PRESSURE: 110 MMHG | TEMPERATURE: 97.3 F

## 2020-10-01 PROCEDURE — 90694 VACC AIIV4 NO PRSRV 0.5ML IM: CPT | Performed by: NURSE PRACTITIONER

## 2020-10-01 PROCEDURE — 99213 OFFICE O/P EST LOW 20 MIN: CPT | Performed by: NURSE PRACTITIONER

## 2020-10-01 PROCEDURE — G0008 ADMIN INFLUENZA VIRUS VAC: HCPCS | Performed by: NURSE PRACTITIONER

## 2020-10-01 ASSESSMENT — ENCOUNTER SYMPTOMS
WHEEZING: 0
COUGH: 0
SHORTNESS OF BREATH: 0

## 2020-10-01 NOTE — PROGRESS NOTES
Vaccine Information Sheet, \"Influenza - Inactivated\"  given to [employer], or parent/legal guardian of  [employer] and verbalized understanding. Patient responses:    Have you ever had a reaction to a flu vaccine? No  Are you able to eat eggs without adverse effects? Yes  Do you have any current illness? No  Have you ever had Guillian [place] Syndrome? No    Flu vaccine given per order. Please see immunization tab.

## 2020-10-01 NOTE — PATIENT INSTRUCTIONS
Goals: 1. Try to eat 2 servings of vegetables today . - half a cup of cooked or one cup of raw   2. 70-90 grams of protein a day. [id number] kcal     Hardboiled eggs, snack on trail mix.

## 2020-10-01 NOTE — PROGRESS NOTES
OUTPATIENT PROGRESS NOTE  Date of Service:  10/1/2020  Address: [address] [place] [address]  [address] [address]  Dept: [phone]  Loc: [phone]    Subjective:      Patient ID:  <[id number]>  Yareli Yang is a 78 y.o. male    HPI: Pt would like to build muscles. 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 Pt said  knows that he wants to build muscles. Pt does feel like he is being pushed. Review of Systems   Constitutional: Negative for chills, fatigue and fever. Respiratory: Negative for cough, shortness of breath and wheezing. All other systems reviewed and are negative. Objective:   Physical Exam  Vitals signs reviewed. Constitutional:       Appearance: Normal appearance. Cardiovascular:      Rate and Rhythm: Normal rate. Skin:     Capillary Refill: Capillary refill takes less than 2 seconds. Neurological:      General: No focal deficit present. Mental Status: He is alert and oriented to person, place, and time. Psychiatric:         Mood and Affect: Mood normal.         Thought Content: Thought content normal.         Judgment: Judgment normal.           Assessment/Plan   1. Epigastric pain  Talked to pt about increasing protein with lean proteins and increasing good calories such as fruits and vegetables. Pt will strive for 4437-4237 kcal     2. Needs flu shot  Due for flu shot   - INFLUENZA, QUADV, ADJUVANTED, 65 YRS =, IM, PF, PREFILL SYR, 0.5ML (FLUAD)    Patient wants to increase muscle. Talk to patient about increasing protein and is calories with good caloric foods such as fruits and vegetables. Patient agrees to try to eat 17 to 1800 marti a day. More than 50% time spent counseling patient on increasing muscle mass and decreasing gastric pain.               Quyen Skaggs, APRN - CNP

## 2020-11-18 ENCOUNTER — PATIENT MESSAGE (OUTPATIENT)
Dept: FAMILY MEDICINE CLINIC | Age: 70
End: 2020-11-18

## 2020-11-18 NOTE — TELEPHONE ENCOUNTER
From: [facility]  To: Digna Morrell MD  Sent: 11/18/2020 11:38 AM EST  Subject: Prescription Question    HI Dr. Rosalio Ashley, hope all is well with you and yours. Question: I recently heard that Prilosec taken over a long period of time might increase the likelihood of my developing blood clots among other risks. Just wondering. I have been taking 20 mg. twice a day since my bout with esophagitis. Should I be concerned? Hope your holiday with filled with the warmth of family and friends.      [address]

## 2020-11-25 ENCOUNTER — TELEPHONE (OUTPATIENT)
Dept: FAMILY MEDICINE CLINIC | Age: 70
End: 2020-11-25

## 2021-01-25 ENCOUNTER — PATIENT MESSAGE (OUTPATIENT)
Dept: FAMILY MEDICINE CLINIC | Age: 71
End: 2021-01-25

## 2021-01-25 ENCOUNTER — OFFICE VISIT (OUTPATIENT)
Dept: FAMILY MEDICINE CLINIC | Age: 71
End: 2021-01-25
Payer: MEDICARE

## 2021-01-25 VITALS
DIASTOLIC BLOOD PRESSURE: 78 MMHG | SYSTOLIC BLOOD PRESSURE: 120 MMHG | TEMPERATURE: 97.1 F | BODY MASS INDEX: 22.4 KG/M2 | HEIGHT: 71 IN | WEIGHT: 160 LBS | HEART RATE: 71 BPM

## 2021-01-25 DIAGNOSIS — Q80.9 XERODERMA: ICD-10-CM

## 2021-01-25 DIAGNOSIS — F31.81 BIPOLAR 2 DISORDER (HCC): ICD-10-CM

## 2021-01-25 DIAGNOSIS — E78.5 DYSLIPIDEMIA: ICD-10-CM

## 2021-01-25 DIAGNOSIS — N52.9 ERECTILE DYSFUNCTION, UNSPECIFIED ERECTILE DYSFUNCTION TYPE: ICD-10-CM

## 2021-01-25 DIAGNOSIS — M15.9 GENERALIZED OSTEOARTHRITIS OF MULTIPLE SITES: ICD-10-CM

## 2021-01-25 DIAGNOSIS — Z00.00 ROUTINE GENERAL MEDICAL EXAMINATION AT A HEALTH CARE FACILITY: Primary | ICD-10-CM

## 2021-01-25 DIAGNOSIS — K22.70 BARRETT'S ESOPHAGUS WITHOUT DYSPLASIA: ICD-10-CM

## 2021-01-25 DIAGNOSIS — F51.04 CHRONIC INSOMNIA: ICD-10-CM

## 2021-01-25 PROCEDURE — G8420 CALC BMI NORM PARAMETERS: HCPCS | Performed by: FAMILY MEDICINE

## 2021-01-25 PROCEDURE — G8427 DOCREV CUR MEDS BY ELIG CLIN: HCPCS | Performed by: FAMILY MEDICINE

## 2021-01-25 PROCEDURE — 99214 OFFICE O/P EST MOD 30 MIN: CPT | Performed by: FAMILY MEDICINE

## 2021-01-25 PROCEDURE — G0439 PPPS, SUBSEQ VISIT: HCPCS | Performed by: FAMILY MEDICINE

## 2021-01-25 PROCEDURE — 1123F ACP DISCUSS/DSCN MKR DOCD: CPT | Performed by: FAMILY MEDICINE

## 2021-01-25 PROCEDURE — 1036F TOBACCO NON-USER: CPT | Performed by: FAMILY MEDICINE

## 2021-01-25 PROCEDURE — 3017F COLORECTAL CA SCREEN DOC REV: CPT | Performed by: FAMILY MEDICINE

## 2021-01-25 PROCEDURE — 36415 COLL VENOUS BLD VENIPUNCTURE: CPT | Performed by: FAMILY MEDICINE

## 2021-01-25 PROCEDURE — G8484 FLU IMMUNIZE NO ADMIN: HCPCS | Performed by: FAMILY MEDICINE

## 2021-01-25 PROCEDURE — 4040F PNEUMOC VAC/ADMIN/RCVD: CPT | Performed by: FAMILY MEDICINE

## 2021-01-25 RX ORDER — ZOLPIDEM TARTRATE 5 MG/1
5 TABLET ORAL NIGHTLY PRN
Qty: 30 TABLET | Refills: 5 | Status: SHIPPED | OUTPATIENT
Start: 2021-01-25 | End: 2021-02-24

## 2021-01-25 ASSESSMENT — PATIENT HEALTH QUESTIONNAIRE - PHQ9
SUM OF ALL RESPONSES TO PHQ QUESTIONS 1-9: 0
1. LITTLE INTEREST OR PLEASURE IN DOING THINGS: 0

## 2021-01-25 ASSESSMENT — LIFESTYLE VARIABLES: HOW OFTEN DO YOU HAVE A DRINK CONTAINING ALCOHOL: 0

## 2021-01-25 NOTE — PATIENT INSTRUCTIONS
Please take any brand otc Vit D3 2000 iu 1 pill daily. Please consider coconut oil to dry hands as needed.

## 2021-01-25 NOTE — PROGRESS NOTES
Medicare Annual Wellness Visit  Name: Ford Alex Date: 2021   MRN: <[id number]> Sex: Male   Age: 78 y.o. Ethnicity: Non-/Non    : 1950 Race: Katelyn Horne is here for Medicare AWV and Other (Insomnia, ED, OA, mood, dry skin, memory concerns)    Screenings for behavioral, psychosocial and functional/safety risks, and cognitive dysfunction are all negative except as indicated below. These results, as well as other patient data from the [address] [facility] [place] form, are documented in Flowsheets linked to this Encounter. No Known Allergies      Prior to Visit Medications    Medication Sig Taking? Authorizing Provider   zolpidem (AMBIEN) 5 MG tablet Take 1 tablet by mouth nightly as needed for Sleep for up to 30 days.  Yes Nissa Tomlin MD   omeprazole (PRILOSEC) 20 MG delayed release capsule TAKE ONE CAPSULE BY MOUTH TWICE A DAY BEFORE MEALS Yes Sourav Reddy MD   sildenafil (VIAGRA) 100 MG tablet TAKE ONE TABLET BY MOUTH DAILY AS NEEDED FOR ERECTILE DYSFUNCTION Yes Baron Knowles APRN - CNP   cetirizine-psuedoephedrine (ZYRTEC-D) 5-120 MG per extended release tablet Take 1 tablet by mouth 2 times daily Yes Historical Provider, MD   venlafaxine (EFFEXOR) 75 MG tablet Take 75 mg by mouth daily  Yes Historical Provider, MD   lithium 300 MG capsule Take 600 mg by mouth every evening  Yes Historical Provider, MD         Past Medical History:   Diagnosis Date    Sanchez esophagus     Dr Bi Kruger, f/u EGD q 3 yr    [facility], [facility]     hypomania    BPH (benign prostatic hyperplasia)     Depression 1980    Diverticulitis     Erosive esophagitis     Hyperlipidemia 2010    Vitamin D deficiency        Past Surgical History:   Procedure Laterality Date    APPENDECTOMY  age 27    COLONOSCOPY  2007    COLONOSCOPY N/A 12/3/2019    COLONOSCOPY & EGD performed by Sourav Reddy MD at [address] [place] [address] HERNIA REPAIR  age 5 and 6    x2    LASIK  2006    PROSTATE BIOPSY      UPPER GASTROINTESTINAL ENDOSCOPY N/A 9/24/2019    EGD performed by Kevin Cuevas MD at [address] N/A 12/3/2019    COLONOSCOPY & EGD performed by Kevin Cuevas MD at [facility] History   Problem Relation Age of Onset    Mental Illness Mother         depression    High Blood Pressure Mother     Stroke Mother [de-identified]    Mental Illness Father         depression    Cancer Father 76        colorectal    Mental Illness Sister         depression    Arthritis Sister     Mental Illness Brother         depression    Arthritis Brother     Thyroid Disease Brother     Cancer Paternal Aunt         ovarian    Cancer Paternal Grandfather         lymphoma    Cancer Paternal Aunt     Diabetes Neg Hx        CareTeam (Including outside providers/suppliers regularly involved in providing care):   Patient Care Team:  Kelechi Horvath MD as PCP - General (Family Medicine)  Kelechi Horvath MD as PCP - [facility] Empaneled Provider    Wt Readings from Last 3 Encounters:   01/25/21 160 lb (72.6 kg)   10/01/20 157 lb (71.2 kg)   12/03/19 150 lb (68 kg)     Vitals:    01/25/21 1043   BP: 120/78   Pulse: 71   Temp: 97.1 °F (36.2 °C)   TempSrc: Temporal   Weight: 160 lb (72.6 kg)   Height: 5' 11\" (1.803 m)     Body mass index is 22.32 kg/m². Based upon direct observation of the patient, evaluation of cognition reveals recent and remote memory intact. Patient's complete Health Risk Assessment and screening values have been reviewed and are found in Flowsheets. The following problems were reviewed today and where indicated follow up appointments were made and/or referrals ordered.     Positive Risk Factor Screenings with Interventions:            General Health and ACP:  General  In general, how would you say your health is?: Good  In the past 7 days, have you experienced any of the following?  New or Increased Pain, New or Increased Fatigue, Loneliness, Social Isolation, Stress or Anger?: None of These  Do you get the social and emotional support that you need?: Yes  Do you have a Living Will?: Yes  Advance Directives     Power of  Living Will ACP-Advance Directive ACP-Power of     Not on File Not on File Not on File Not on File      General Health Risk Interventions:  · CPM     Hearing/Vision:  No exam data present  Hearing/Vision  Do you or your family notice any trouble with your hearing?: No  Do you have difficulty driving, watching TV, or doing any of your daily activities because of your eyesight?: No  Have you had an eye exam within the past year?: (!) No  Hearing/Vision Interventions:  · Plans to make eye exam      Personalized Preventive Plan   Current Health Maintenance Status  Immunization History   Administered Date(s) Administered    Flu 18 Yrs Intradermal, Preservative Free 12/20/2012    Influenza, Intradermal, Preservative free 12/20/2012, 09/28/2015    Influenza, Quadv, adjuvanted, 65 yrs +, IM, PF (Fluad) 10/01/2020    Influenza, Triv, inactivated, subunit, adjuvanted, IM (Fluad 65 yrs and older) 02/04/2020    Pneumococcal Conjugate 13-valent ([id number]) 04/02/2018    Pneumococcal Polysaccharide (Nnukhmqrs18) 06/19/2019    Tdap (Boostrix, Adacel) 03/30/2012, 12/17/2015        Health Maintenance   Topic Date Due    Shingles Vaccine (1 of 2) 04/21/2002    Annual Wellness Visit (AWV)  05/29/2019    Colon cancer screen colonoscopy  12/03/2021    Lipid screen  04/02/2023    DTaP/Tdap/Td vaccine (3 - Td) 12/17/2025    Flu vaccine  Completed    Pneumococcal 65+ years Vaccine  Completed    AAA screen  Completed    Hepatitis C screen  Completed    Hepatitis A vaccine  Aged Out    Hepatitis B vaccine  Aged Out    Hib vaccine  Aged Out    Meningococcal (ACWY) vaccine  Aged Out     Recommendations for [employer] Due: see orders and patient generalized stiffness. For 15 yrs, sits to void 2/2 internal hemorrhoid. Current Outpatient Medications   Medication Sig Dispense Refill    zolpidem (AMBIEN) 5 MG tablet Take 1 tablet by mouth nightly as needed for Sleep for up to 30 days. 30 tablet 5    omeprazole (PRILOSEC) 20 MG delayed release capsule TAKE ONE CAPSULE BY MOUTH TWICE A DAY BEFORE MEALS 180 capsule 2    sildenafil (VIAGRA) 100 MG tablet TAKE ONE TABLET BY MOUTH DAILY AS NEEDED FOR ERECTILE DYSFUNCTION 6 tablet 2    cetirizine-psuedoephedrine (ZYRTEC-D) 5-120 MG per extended release tablet Take 1 tablet by mouth 2 times daily      venlafaxine (EFFEXOR) 75 MG tablet Take 75 mg by mouth daily       lithium 300 MG capsule Take 600 mg by mouth every evening        No current facility-administered medications for this visit. Patient's past medical history,surgical history, family history, medications,  and allergies  were all reviewed and updated as appropriate today. Review of Systems  As abv    Physical Exam  Constitutional:       General: He is not in acute distress. Appearance: Normal appearance. He is well-developed. He is not ill-appearing. HENT:      Head: Normocephalic and atraumatic. Right Ear: External ear normal.      Left Ear: External ear normal.   Eyes:      General: No scleral icterus. Right eye: No discharge. Left eye: No discharge. Extraocular Movements: Extraocular movements intact. Conjunctiva/sclera: Conjunctivae normal.   Neck:      Musculoskeletal: Normal range of motion and neck supple. Vascular: No carotid bruit. Comments: Neg TMG  Cardiovascular:      Rate and Rhythm: Normal rate and regular rhythm. Pulses: Normal pulses. Heart sounds: Normal heart sounds. No murmur. Pulmonary:      Effort: Pulmonary effort is normal. No respiratory distress. Breath sounds: Normal breath sounds.    Abdominal:      General: Bowel sounds are normal. There is no distension. Palpations: Abdomen is soft. There is no mass. Tenderness: There is no abdominal tenderness. Musculoskeletal: Normal range of motion. Right lower leg: No edema. Left lower leg: No edema. Lymphadenopathy:      Cervical: No cervical adenopathy. Skin:     General: Skin is warm and dry. Capillary Refill: Capillary refill takes less than 2 seconds. Coloration: Skin is not jaundiced or pale. Findings: No rash. Neurological:      General: No focal deficit present. Mental Status: He is alert and oriented to person, place, and time. Cranial Nerves: No cranial nerve deficit. Deep Tendon Reflexes: Reflexes are normal and symmetric. Psychiatric:         Mood and Affect: Mood normal.         Behavior: Behavior normal.         Thought Content: Thought content normal.         Judgment: Judgment normal.           /78   Pulse 71   Temp 97.1 °F (36.2 °C) (Temporal)   Ht 5' 11\" (1.803 m)   Wt 160 lb (72.6 kg)   BMI 22.32 kg/m²     Assessment/Plan:    Khanh Cosme was seen today for medicare awv and other. 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 Repeat Cscope soon with 2 d prep 2/2 incomplete prep. Chronic insomnia  -     zolpidem (AMBIEN) 5 MG tablet; Take 1 tablet by mouth nightly as needed for Sleep for up to 30 days, rf.  -     Comprehensive Metabolic Panel    Erectile dysfunction, unspecified erectile dysfunction type   Suspect inability to ejaculate at times is 2/2 stress, concern for wife who reports pain with intercourse despite lubrication. Bipolar 2 disorder ([facility]  -     Comprehensive Metabolic Panel   Agree that light box may help depression. Vit D 2000 iu qd rec'd as well.     Dyslipidemia  -     Lipid Panel    Generalized OA of multiple sites   Cont routine

## 2021-01-26 LAB
A/G RATIO: 1.6 (ref 1.1–2.2)
ALBUMIN SERPL-MCNC: 4.5 G/DL (ref 3.4–5)
ALP BLD-CCNC: 73 U/L (ref 40–129)
ALT SERPL-CCNC: 21 U/L (ref 10–40)
ANION GAP SERPL CALCULATED.3IONS-SCNC: 13 MMOL/L (ref 3–16)
AST SERPL-CCNC: 37 U/L (ref 15–37)
BILIRUB SERPL-MCNC: <0.2 MG/DL (ref 0–1)
BUN BLDV-MCNC: 19 MG/DL (ref 7–20)
CALCIUM SERPL-MCNC: 9.4 MG/DL (ref 8.3–10.6)
CHLORIDE BLD-SCNC: 100 MMOL/L (ref 99–110)
CHOLESTEROL, TOTAL: 191 MG/DL (ref 0–199)
CO2: 26 MMOL/L (ref 21–32)
CREAT SERPL-MCNC: 1 MG/DL (ref 0.8–1.3)
GFR AFRICAN AMERICAN: >60
GFR NON-AFRICAN AMERICAN: >60
GLOBULIN: 2.8 G/DL
GLUCOSE BLD-MCNC: 76 MG/DL (ref 70–99)
HDLC SERPL-MCNC: 68 MG/DL (ref 40–60)
LDL CHOLESTEROL CALCULATED: 98 MG/DL
POTASSIUM SERPL-SCNC: 4.6 MMOL/L (ref 3.5–5.1)
SODIUM BLD-SCNC: 139 MMOL/L (ref 136–145)
TOTAL PROTEIN: 7.3 G/DL (ref 6.4–8.2)
TRIGL SERPL-MCNC: 125 MG/DL (ref 0–150)
VLDLC SERPL CALC-MCNC: 25 MG/DL

## 2021-01-26 NOTE — TELEPHONE ENCOUNTER
From: Gerhard Horne  To: Donnie Craft MD  Sent: 1/25/2021 9:20 PM EST  Subject: Non-Urgent Medical Question    HI Tati,   I don't have a question. I just wanted to thank you for today. Whenever I meet with you I always leave feeling good about myself, my health, and my future. You are fully present when we meet and I feel for that 30 minutes you are focused on me and I am getting your best. (I'm sure you have your days as we all do but somehow you deliver:)) I also appreciate your empathy, your objectivity, and your thoughtful recommendations. I count my blessings that you are my doctor.      Ninoska Helms

## 2021-01-28 RX ORDER — OMEPRAZOLE 20 MG/1
CAPSULE, DELAYED RELEASE ORAL
Qty: 180 CAPSULE | Refills: 1 | OUTPATIENT
Start: 2021-01-28

## 2021-01-29 ENCOUNTER — TELEPHONE (OUTPATIENT)
Dept: GASTROENTEROLOGY | Age: 71
End: 2021-01-29

## 2021-02-04 RX ORDER — OMEPRAZOLE 20 MG/1
20 CAPSULE, DELAYED RELEASE ORAL DAILY
Qty: 30 CAPSULE | Refills: 0 | Status: SHIPPED | OUTPATIENT
Start: 2021-02-04 | End: 2021-02-23 | Stop reason: SDUPTHER

## 2021-02-04 NOTE — TELEPHONE ENCOUNTER
Pt is scheduled for an appointment at [facility] [place] office so if you could send script in please that would be great, thanks.

## 2021-02-23 ENCOUNTER — OFFICE VISIT (OUTPATIENT)
Dept: GASTROENTEROLOGY | Age: 71
End: 2021-02-23
Payer: MEDICARE

## 2021-02-23 VITALS
HEART RATE: 79 BPM | TEMPERATURE: 97.2 F | BODY MASS INDEX: 22.6 KG/M2 | SYSTOLIC BLOOD PRESSURE: 123 MMHG | DIASTOLIC BLOOD PRESSURE: 84 MMHG | WEIGHT: 161.4 LBS | HEIGHT: 71 IN

## 2021-02-23 DIAGNOSIS — K22.70 BARRETT'S ESOPHAGUS DETERMINED BY ENDOSCOPY: Primary | ICD-10-CM

## 2021-02-23 DIAGNOSIS — K21.00 GASTROESOPHAGEAL REFLUX DISEASE WITH ESOPHAGITIS WITHOUT HEMORRHAGE: ICD-10-CM

## 2021-02-23 PROBLEM — R10.13 EPIGASTRIC PAIN: Status: RESOLVED | Noted: 2019-09-13 | Resolved: 2021-02-23

## 2021-02-23 PROCEDURE — 3017F COLORECTAL CA SCREEN DOC REV: CPT | Performed by: INTERNAL MEDICINE

## 2021-02-23 PROCEDURE — G8427 DOCREV CUR MEDS BY ELIG CLIN: HCPCS | Performed by: INTERNAL MEDICINE

## 2021-02-23 PROCEDURE — 99213 OFFICE O/P EST LOW 20 MIN: CPT | Performed by: INTERNAL MEDICINE

## 2021-02-23 PROCEDURE — 4040F PNEUMOC VAC/ADMIN/RCVD: CPT | Performed by: INTERNAL MEDICINE

## 2021-02-23 PROCEDURE — G8420 CALC BMI NORM PARAMETERS: HCPCS | Performed by: INTERNAL MEDICINE

## 2021-02-23 PROCEDURE — G8484 FLU IMMUNIZE NO ADMIN: HCPCS | Performed by: INTERNAL MEDICINE

## 2021-02-23 PROCEDURE — 1036F TOBACCO NON-USER: CPT | Performed by: INTERNAL MEDICINE

## 2021-02-23 PROCEDURE — 1123F ACP DISCUSS/DSCN MKR DOCD: CPT | Performed by: INTERNAL MEDICINE

## 2021-02-23 RX ORDER — OMEPRAZOLE 20 MG/1
20 CAPSULE, DELAYED RELEASE ORAL DAILY
Qty: 90 CAPSULE | Refills: 1 | Status: SHIPPED | OUTPATIENT
Start: 2021-02-23 | End: 2021-08-24 | Stop reason: SDUPTHER

## 2021-02-23 NOTE — PROGRESS NOTES
[address]  [address] [facility]  Phone: [phone]   RRT:[phone]    CHIEF COMPLAINT     Chief Complaint   Patient presents with    Follow-up     GERD, Barretts esophagus        HPI     Diogenes Mack is a 78 y.o. male who presents for followup for GERD/Barretts. He has been doing well and his reflux is controlled with omeprazole twice daily, he is worried about the side effects of omeprazole in the long-term and would like to see if he can reduce his medications, he does not have any abdominal pain. EGD/colonoscopy 12/3/2019  -EGD -The erosive esophagitis seen at prior exam has healed. There is possible Barretts over a 2 cm area from 39 to 41 cms with intervening islands of squamous mucosa, biopsies taken. There is a 3 cm sliding hiatal hernia. Stomach and duodenum normal to the second portion. Distal esophagus, biopsy: -  Gastroesophageal junction mucosa with focal intestinal metaplasia, consistent with Sanchez's mucosa. Negative for dysplasia or malignancy. - Colonoscopy - Severe diverticulosis in the sigmoid and descending colon. Redundant colon with fair prep in the left colon and poor prep in several areas in the right colon. No large lesions seen but prep is a limitation.     PAST MEDICAL HISTORY     Past Medical History:   Diagnosis Date    Sanchez esophagus 2019    Dr Kaela Colon, f/u EGD q 3 yr    Bipolar 2 disorder, major depressive episode ([facility] [address] 2012    hypomania    BPH (benign prostatic hyperplasia)     Depression 1980    Diverticulitis     Erosive esophagitis 2019    Hyperlipidemia 2010    Vitamin D deficiency      FAMILY HISTORY     Family History   Problem Relation Age of Onset    Mental Illness Mother         depression    High Blood Pressure Mother     Stroke Mother [de-identified]    Mental Illness Father         depression    Cancer Father 76        colorectal    Mental Illness Sister         depression    Arthritis Sister     Mental Illness Brother         depression    Arthritis Brother     Thyroid Disease Brother     Cancer Paternal Aunt         ovarian    Cancer Paternal Grandfather         lymphoma    Cancer Paternal Aunt     Diabetes Neg Hx      SOCIAL HISTORY     Social History     Socioeconomic History    Marital status:      Spouse name: Not on file    Number of children: Not on file    Years of education: Not on file    Highest education level: Not on file   Occupational History    Not on file   Social Needs    Financial resource strain: Not on file    Food insecurity     Worry: Not on file     Inability: Not on file    Transportation needs     Medical: Not on file     Non-medical: Not on file   Tobacco Use    Smoking status: Former Smoker     Packs/day: 1.00     Years: 12.00     Pack years: 12.00     Quit date: 2008     Years since quittin.1    Smokeless tobacco: Never Used   Substance and Sexual Activity    Alcohol use: No     Alcohol/week: 20.0 standard drinks     Types: 20 Cans of beer per week     Comment: quit     Drug use: No     Comment: quit 2013    Sexual activity: Yes     Partners: Female   Lifestyle    Physical activity     Days per week: Not on file     Minutes per session: Not on file    Stress: Not on file   Relationships    Social connections     Talks on phone: Not on file     Gets together: Not on file     Attends Restoration service: Not on file     Active member of club or organization: Not on file     Attends meetings of clubs or organizations: Not on file     Relationship status: Not on file    Intimate partner violence     Fear of current or ex partner: Not on file     Emotionally abused: Not on file     Physically abused: Not on file     Forced sexual activity: Not on file   Other Topics Concern    Not on file   Social History Narrative    Not on file     SURGICAL HISTORY     Past Surgical History:   Procedure Laterality Date    APPENDECTOMY  age 27    COLONOSCOPY nosebleeds. Eyes: Negative for pain and visual disturbance. Respiratory: Negative for cough, shortness of breath and wheezing. Cardiovascular: Negative for chest pain, palpitations and leg swelling. Gastrointestinal: see HPI for details. Musculoskeletal: Positive for arthralgias and back pain. Skin: Negative for pallor and rash. Hematological: Negative for adenopathy. Does not bruise/bleed easily. Psychiatric/Behavioral: Negative for agitation, confusion, hallucinations. PHYSICAL EXAM   VITAL SIGNS: /84 (Site: Left Wrist, Position: Sitting, Cuff Size: Medium Adult)   Pulse 79   Temp 97.2 °F (36.2 °C) (Temporal)   Ht 5' 11\" (1.803 m)   Wt 161 lb 6.4 oz (73.2 kg)   BMI 22.51 kg/m²   Wt Readings from Last 3 Encounters:   02/23/21 161 lb 6.4 oz (73.2 kg)   01/25/21 160 lb (72.6 kg)   10/01/20 157 lb (71.2 kg)     Gen: AAO3, NAD  HEENT: no pallor or icterus  RS: clear to auscultation bilaterally  CVS: S1S2 RRR, no murmurs  GI: soft, nontender, not distended, BS+, no hepatosplenomegaly  Ext: no edema or clubbing     FINAL IMPRESSION     Nondysplastic Sanchez's esophagus on endoscopy 12/3/2019 -repeat EGD is recommended every 3 years, next due 12/2022    GERD is currently controlled with twice daily Omeprazole. He is worried about the side effects of omeprazole long-term and we discussed risks and benefits in detail. We can try reducing it to once a day but given history of erosive esophagitis and Sanchez's would stay on at least once a day PPI. Medication refilled.

## 2021-02-25 ENCOUNTER — PATIENT MESSAGE (OUTPATIENT)
Dept: FAMILY MEDICINE CLINIC | Age: 71
End: 2021-02-25

## 2021-02-25 NOTE — TELEPHONE ENCOUNTER
From: Ny Perez Guard  To: Odalys King MD  Sent: 2/25/2021 9:55 AM EST  Subject: Non-Urgent Medical Question    HI Dr. Price Hilario,     Question: should I get a covid vaccine and if so which one do you recommend? That's it, have a blessed day.      Oneda Rang

## 2021-03-15 ENCOUNTER — PATIENT MESSAGE (OUTPATIENT)
Dept: FAMILY MEDICINE CLINIC | Age: 71
End: 2021-03-15

## 2021-03-15 NOTE — TELEPHONE ENCOUNTER
From: Leopoldo Bud Guard  To: Rach Guevara MD  Sent: 3/15/2021 8:49 AM EDT  Subject: Non-Urgent Sonia Cadet, hope your day is going well. Q: which covid vaccine should I get? Is there one you prefer I get?  thanks - jada

## 2021-04-12 ENCOUNTER — PATIENT MESSAGE (OUTPATIENT)
Dept: FAMILY MEDICINE CLINIC | Age: 71
End: 2021-04-12

## 2021-04-12 NOTE — TELEPHONE ENCOUNTER
From: Joann Anam Guard  To: Myra Mcburney, MD  Sent: 4/12/2021 11:41 AM EDT  Subject: Test Results Question    HI Dr. Shaila Miller your day is going well. I had a covid anti-body test last Friday (4/9) [facility]. I got the results yesterday but I don't understand them. I think I had Covid about 6 weeks ago. But the test results don't seem conclusive. I'm attaching this screen grab of my results. I think you will be copied on them anyway but maybe this saves some time. All is well otherwise!      Best Regards, [employer]

## 2021-06-01 RX ORDER — SILDENAFIL 100 MG/1
TABLET, FILM COATED ORAL
Qty: 6 TABLET | Refills: 1 | Status: SHIPPED | OUTPATIENT
Start: 2021-06-01 | End: 2022-03-31

## 2021-07-14 ENCOUNTER — OFFICE VISIT (OUTPATIENT)
Dept: FAMILY MEDICINE CLINIC | Age: 71
End: 2021-07-14
Payer: MEDICARE

## 2021-07-14 VITALS
OXYGEN SATURATION: 99 % | HEIGHT: 71 IN | HEART RATE: 73 BPM | WEIGHT: 158.5 LBS | TEMPERATURE: 98.3 F | SYSTOLIC BLOOD PRESSURE: 138 MMHG | BODY MASS INDEX: 22.19 KG/M2 | DIASTOLIC BLOOD PRESSURE: 82 MMHG

## 2021-07-14 DIAGNOSIS — F51.04 CHRONIC INSOMNIA: Primary | ICD-10-CM

## 2021-07-14 DIAGNOSIS — R06.83 SNORING: ICD-10-CM

## 2021-07-14 PROCEDURE — G8420 CALC BMI NORM PARAMETERS: HCPCS | Performed by: REGISTERED NURSE

## 2021-07-14 PROCEDURE — 99213 OFFICE O/P EST LOW 20 MIN: CPT | Performed by: REGISTERED NURSE

## 2021-07-14 PROCEDURE — 1123F ACP DISCUSS/DSCN MKR DOCD: CPT | Performed by: REGISTERED NURSE

## 2021-07-14 PROCEDURE — 4040F PNEUMOC VAC/ADMIN/RCVD: CPT | Performed by: REGISTERED NURSE

## 2021-07-14 PROCEDURE — 1036F TOBACCO NON-USER: CPT | Performed by: REGISTERED NURSE

## 2021-07-14 PROCEDURE — G8427 DOCREV CUR MEDS BY ELIG CLIN: HCPCS | Performed by: REGISTERED NURSE

## 2021-07-14 PROCEDURE — 3017F COLORECTAL CA SCREEN DOC REV: CPT | Performed by: REGISTERED NURSE

## 2021-07-14 RX ORDER — ZOLPIDEM TARTRATE 5 MG/1
5 TABLET ORAL NIGHTLY PRN
Qty: 30 TABLET | Refills: 0 | Status: SHIPPED | OUTPATIENT
Start: 2021-07-14 | End: 2021-08-24

## 2021-07-14 RX ORDER — ZOLPIDEM TARTRATE 5 MG/1
TABLET ORAL
COMMUNITY
Start: 2021-06-25 | End: 2021-07-14 | Stop reason: SDUPTHER

## 2021-07-14 SDOH — ECONOMIC STABILITY: FOOD INSECURITY: WITHIN THE PAST 12 MONTHS, THE FOOD YOU BOUGHT JUST DIDN'T LAST AND YOU DIDN'T HAVE MONEY TO GET MORE.: NEVER TRUE

## 2021-07-14 SDOH — ECONOMIC STABILITY: FOOD INSECURITY: WITHIN THE PAST 12 MONTHS, YOU WORRIED THAT YOUR FOOD WOULD RUN OUT BEFORE YOU GOT MONEY TO BUY MORE.: NEVER TRUE

## 2021-07-14 ASSESSMENT — ENCOUNTER SYMPTOMS: SHORTNESS OF BREATH: 0

## 2021-07-14 ASSESSMENT — SOCIAL DETERMINANTS OF HEALTH (SDOH): HOW HARD IS IT FOR YOU TO PAY FOR THE VERY BASICS LIKE FOOD, HOUSING, MEDICAL CARE, AND HEATING?: NOT HARD AT ALL

## 2021-07-14 NOTE — PROGRESS NOTES
Patient: Kingsley Buchanan is a 73 y.o. male who presents today with the following Chief Complaint(s):  Chief Complaint   Patient presents with    Medication Check       Assessment/Plan:  1. Chronic insomnia  Stable with zolpidem. Prescription sent to pharmacy. - zolpidem (AMBIEN) 5 MG tablet; Take 1 tablet by mouth nightly as needed for Sleep for up to 30 days. Dispense: 30 tablet; Refill: 0  - Az Chin MD, Sleep Medicine, [facility]    2. Snoring  Follow-up with sleep specialist for a sleep study. - Az Chin MD, Sleep Medicine, [facility]        Return in about 3 months (around 10/14/2021). HPI:     He is here for a medication check for Ambien. He says it is working well for him overall. He says sometimes it doesn't work as well as he would like. He says he has talked to his psychiatrist about this and had some of his other medications adjusted. He is not having any issues with sleep walking while on Ambien. He says he has a good bedtime ritual. He reads before bed. He says his wife has told him that he snores. He is working on breathing practices. He has not had a sleep study. Current Outpatient Medications   Medication Sig Dispense Refill    zolpidem (AMBIEN) 5 MG tablet Take 1 tablet by mouth nightly as needed for Sleep for up to 30 days. 30 tablet 0    sildenafil (VIAGRA) 100 MG tablet TAKE ONE TABLET BY MOUTH DAILY AS NEEDED FOR ERECTILE DYSFUNCTION 6 tablet 1    omeprazole (PRILOSEC) 20 MG delayed release capsule Take 1 capsule by mouth Daily 90 capsule 1    cetirizine-psuedoephedrine (ZYRTEC-D) 5-120 MG per extended release tablet Take 1 tablet by mouth 2 times daily      venlafaxine (EFFEXOR) 75 MG tablet Take 75 mg by mouth daily       lithium 300 MG capsule Take 600 mg by mouth every evening        No current facility-administered medications for this visit. Review of Systems   Constitutional: Negative for fatigue.    Respiratory: Negative for shortness of breath. Cardiovascular: Negative for chest pain. Neurological: Negative for light-headedness and headaches. Psychiatric/Behavioral: Positive for sleep disturbance. Negative for dysphoric mood. The patient is not nervous/anxious. Vitals:    07/14/21 0843   BP: 138/82   Site: Left Upper Arm   Position: Sitting   Cuff Size: Medium Adult   Pulse: 73   Temp: 98.3 °F (36.8 °C)   TempSrc: Infrared   SpO2: 99%   Weight: 158 lb 8 oz (71.9 kg)   Height: 5' 11\" (1.803 m)     Physical Exam  Constitutional:       General: He is not in acute distress. Appearance: Normal appearance. He is not ill-appearing. HENT:      Head: Normocephalic and atraumatic. Cardiovascular:      Rate and Rhythm: Normal rate and regular rhythm. Pulses: Normal pulses. Heart sounds: Normal heart sounds. No murmur heard. No friction rub. No gallop. Pulmonary:      Effort: Pulmonary effort is normal.      Breath sounds: Normal breath sounds. No stridor. No wheezing, rhonchi or rales. Musculoskeletal:         General: Normal range of motion. Cervical back: Normal range of motion. Right lower leg: No edema. Left lower leg: No edema. Lymphadenopathy:      Cervical: No cervical adenopathy. Skin:     General: Skin is warm and dry. Coloration: Skin is not jaundiced or pale. Findings: No erythema. Neurological:      Mental Status: He is alert and oriented to person, place, and time. Psychiatric:         Mood and Affect: Mood normal.         Behavior: Behavior normal.         Thought Content: Thought content normal.         Judgment: Judgment normal.            Patient's past medical history, surgical history, family history, medications,  and allergies  were all reviewed and updated as appropriate today.     Patient Active Problem List   Diagnosis    Vitamin D deficiency    Hyperlipidemia    Bone spur    [facility] 2 disorder ([facility] [address]    Insomnia due to medical condition    Normocytic anemia    Erosive esophagitis    Hiatal hernia    Sanchez's esophagus determined by endoscopy    Diverticulosis of colon    Gastroesophageal reflux disease with esophagitis without hemorrhage     Past Medical History:   Diagnosis Date    Sanchez esophagus 2019    Dr Mills Right, f/u EGD q 3 yr    Bipolar 2 disorder, major depressive episode ([facility] [address] 2012    hypomania    BPH (benign prostatic hyperplasia)     Depression 1980    Diverticulitis     Erosive esophagitis 2019    Hyperlipidemia 2010    Vitamin D deficiency       Past Surgical History:   Procedure Laterality Date    APPENDECTOMY  age 27    COLONOSCOPY  2007    COLONOSCOPY N/A 12/3/2019    COLONOSCOPY & EGD performed by Agnes Lyons MD at [address] [place] [address]  age 4 and 5    x2    LASIK  2006    PROSTATE BIOPSY      UPPER GASTROINTESTINAL ENDOSCOPY N/A 9/24/2019    EGD performed by Agnes Lyons MD at [address] [facility] N/A 12/3/2019    COLONOSCOPY & EGD performed by Agnes Lyons MD at [address]       Family History   Problem Relation Age of Onset    Mental Illness Mother         depression    High Blood Pressure Mother     Stroke Mother [de-identified]    Mental Illness Father         depression    Cancer Father 76        colorectal    Mental Illness Sister         depression    Arthritis Sister     Mental Illness Brother         depression    Arthritis Brother     Thyroid Disease Brother     Cancer Paternal Aunt         ovarian    Cancer Paternal Grandfather         lymphoma    Cancer Paternal Aunt     Diabetes Neg Hx       No Known Allergies    This chart was generated using the [address] [place] [address] [address] dictation system. I created this record but it may contain dictation errors due to the limitation of the software.

## 2021-08-23 NOTE — PROGRESS NOTES
[address] [facility],  [address] [place] [address] Kena Mcgee, [address] [place] [address]  Phone: [phone]      CHIEF COMPLAINT  Chief Complaint   Patient presents with    Follow-up     Former Dr. Matos Host patient follow up yuan's and GERD,        SUBJECTIVE  Ríos Adelina is a 73 y.o. male with medical history of nondysplastic Yuan's esophagus (C0M2), bipolar disorder, depression, diverticulosis, hyperlipidemia who returns for follow-up. Patient previously followed with GI Dr. David Millan for GERD and Yuan's esophagus. Reports good compliance with omeprazole 20 mg daily. Patient offers no complaints. Denies abdominal pain, nausea, vomiting, fever, chills, unintentional weight loss, constipation, diarrhea, hematochezia or melenic stools. Date of last office visit and details: 2/23/21 with Dr. David Millan  78 y.o. male who presents for followup for GERD/Barretts. He has been doing well and his reflux is controlled with omeprazole twice daily, he is worried about the side effects of omeprazole in the long-term and would like to see if he can reduce his medications, he does not have any abdominal pain.     Last EGD 12/3/2019 with Dr. David Millan- Erosive esophagitis seen at prior exam has healed. Possible Barretts over a 2 cm area from 39 to 41 cms with intervening islands of squamous mucosa, biopsied (path -  gastroesophageal junction mucosa with focal intestinal metaplasia, consistent with Yuan's mucosa. Negative for dysplasia or malignancy). A 3 cm sliding hiatal hernia. Stomach and duodenum normal to the second portion. Repeat EGD for surveillance in 3 years. Last Colonoscopy  12/3/2019 with Dr. David Millan - Severe diverticulosis in the sigmoid and descending colon. Redundant colon with fair prep in the left colon and poor prep in several areas in the right colon. No large lesions seen but prep is a limitation.  Repeat colonoscopy 2 years with 2 day prep        PAST MEDICAL HISTORY Past Medical History:   Diagnosis Date    Sanchez esophagus 2019    Dr Jackie Hicks, f/u EGD q 3 yr    Bipolar 2 disorder, major depressive episode ([address] 2012    hypomania    BPH (benign prostatic hyperplasia)     Depression 1980    Diverticulitis     Erosive esophagitis 2019    Hyperlipidemia 2010    Vitamin D deficiency      FAMILY HISTORY     Family History   Problem Relation Age of Onset    Mental Illness Mother         depression    High Blood Pressure Mother     Stroke Mother [de-identified]    Mental Illness Father         depression    Cancer Father 76        colorectal    Mental Illness Sister         depression    Arthritis Sister     Mental Illness Brother         depression    Arthritis Brother     Thyroid Disease Brother     Cancer Paternal Aunt         ovarian    Cancer Paternal Grandfather         lymphoma    Cancer Paternal Aunt     Diabetes Neg Hx      SOCIAL HISTORY     Social History     Socioeconomic History    Marital status:      Spouse name: Not on file    Number of children: Not on file    Years of education: Not on file    Highest education level: Not on file   Occupational History    Not on file   Tobacco Use    Smoking status: Former Smoker     Packs/day: 1.00     Years: 12.00     Pack years: 12.00     Quit date: 2008     Years since quittin.6    Smokeless tobacco: Never Used   Substance and Sexual Activity    Alcohol use: No     Alcohol/week: 20.0 standard drinks     Types: 20 Cans of beer per week     Comment: quit     Drug use: No     Comment: quit 2013    Sexual activity: Yes     Partners: Female   Other Topics Concern    Not on file   Social History Narrative    Not on file     Social Determinants of Health     Financial Resource Strain: Low Risk     Difficulty of Paying Living Expenses: Not hard at all   Food Insecurity: No Food Insecurity    Worried About Running Out of Food in the Last Year: Never true    [address] in the Last Year: Never true   Transportation Needs:     Lack of Transportation (Medical):  Lack of Transportation (Non-Medical):    Physical Activity:     Days of Exercise per Week:     Minutes of Exercise per Session:    Stress:     Feeling of Stress :    Social Connections:     Frequency of Communication with Friends and Family:     Frequency of Social Gatherings with Friends and Family:     Attends Christian Services:     Active Member of Clubs or Organizations:     Attends Club or Organization Meetings:     Marital Status:    Intimate Partner Violence:     Fear of Current or Ex-Partner:     Emotionally Abused:     Physically Abused:     Sexually Abused:      SURGICAL HISTORY     Past Surgical History:   Procedure Laterality Date    APPENDECTOMY  age 27    COLONOSCOPY  2007    COLONOSCOPY N/A 12/3/2019    COLONOSCOPY & EGD performed by Chepe Zepeda MD at [address] [place] [address]  age 4 and 6    x2    LASIK  2006    PROSTATE BIOPSY      UPPER GASTROINTESTINAL ENDOSCOPY N/A 9/24/2019    EGD performed by Chepe Zepeda MD at [facility] 12/3/2019    COLONOSCOPY & EGD performed by Chepe Zepeda MD at [place] [address]   (This list may include medications prescribed during this encounter as epic can not insert only the list prior to this encounter.)  Current Outpatient Rx   Medication Sig Dispense Refill    omeprazole (PRILOSEC) 20 MG delayed release capsule Take 1 capsule by mouth Daily 90 capsule 1    bisacodyl (DULCOLAX) 5 MG EC tablet Take 4 tablets by mouth once for 1 dose Take as directed for colonoscopy.  4 tablet 0    polyethylene glycol (MIRALAX) 17 GM/SCOOP POWD powder Take 238 g by mouth daily Take as directed for colonoscopy 255 g 0    magnesium citrate solution Take 296 mLs by mouth once for 1 dose 296 mL 0    sildenafil (VIAGRA) 100 MG tablet TAKE ONE TABLET BY MOUTH DAILY AS NEEDED FOR ERECTILE DYSFUNCTION 6 tablet 1    cetirizine-psuedoephedrine (ZYRTEC-D) 5-120 MG per extended release tablet Take 1 tablet by mouth 2 times daily      venlafaxine (EFFEXOR) 75 MG tablet Take 187 mg by mouth daily       lithium 300 MG capsule Take 600 mg by mouth every evening        ALLERGIES   No Known Allergies  IMMUNIZATIONS     Immunization History   Administered Date(s) Administered    Flu 18 Yrs Intradermal, Preservative Free 12/20/2012    Influenza, Intradermal, Preservative free 12/20/2012, 09/28/2015    Influenza, Quadv, adjuvanted, 65 yrs +, IM, PF (Fluad) 10/01/2020    Influenza, Triv, inactivated, subunit, adjuvanted, IM (Fluad 65 yrs and older) 02/04/2020    Pneumococcal Conjugate 13-valent ([id number]) 04/02/2018    Pneumococcal Polysaccharide ([id number]) 06/19/2019    Tdap (Boostrix, Adacel) 03/30/2012, 12/17/2015       REVIEW OF SYSTEMS   See HPI for further details and pertinent postiives. Negative for the following:  Constitutional: Negative for weight change. Negative for appetite change and fatigue. HENT: Negative for nosebleeds, sore throat, mouth sores, and voice change. Respiratory: Negative for cough, choking and chest tightness. Cardiovascular: Negative for chest pain   Gastrointestinal: See HPI  Musculoskeletal: Negative for arthralgias. Skin: Negative for pallor. Neurological: Negative for weakness and light-headedness. Hematological: Negative for adenopathy. Does not bruise/bleed easily. Psychiatric/Behavioral: Negative for suicidal ideas.      PHYSICAL EXAM   VITAL SIGNS: /65 (Site: Left Wrist, Position: Sitting, Cuff Size: Medium Adult)   Pulse 92   Ht 5' 11\" (1.803 m)   Wt 156 lb 6.4 oz (70.9 kg)   BMI 21.81 kg/m²   Wt Readings from Last 3 Encounters:   08/24/21 156 lb 6.4 oz (70.9 kg)   07/14/21 158 lb 8 oz (71.9 kg)   02/23/21 161 lb 6.4 oz (73.2 kg)     Constitutional: Well developed, Well nourished, No acute distress, Non-toxic appearance. HENT: Normocephalic, Atraumatic, Bilateral external ears normal, Oropharynx moist, No oral exudates, Nose normal.   Eyes: Conjunctiva normal, No discharge. Neck: Normal range of motion, No tenderness, Supple, No stridor. Cardiovascular: Normal heart rate, Normal rhythm, No murmurs, No rubs, No gallops. Thorax & Lungs: Normal breath sounds, No respiratory distress, No wheezing, No chest tenderness. Abdomen: normal bowel sounds, soft, non tender, non distended,, no hernias,     Rectal:  Deferred. Skin: Warm, Dry, No erythema, No rash. No bruising. Lower Extremities: Intact distal pulses, No edema, No tenderness, No cyanosis, No clubbing. Neurologic: Alert & oriented x 3, Normal motor function, Normal sensory function, No focal deficits noted. No results found. FINAL IMPRESSION   Renetta Perez was seen today for follow-up. Diagnoses and all orders for this visit:    Colon cancer screening; history of poor bowel prep  -     COLONOSCOPY W/ OR W/O BIOPSY with 2-day bowel prep  -     bisacodyl (DULCOLAX) 5 MG EC tablet; Take 4 tablets by mouth once for 1 dose Take as directed for colonoscopy. 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 peppermint, chocolate, alcohol, coffee, citrus fruits and juices, tomoato products; avoid lying down for 2 to 3 hours after eating, avoid tight fitting clothing.    -      Repeat EGD in 2022     Gastroesophageal reflux disease without esophagitis  -     omeprazole (PRILOSEC) 20 MG delayed release capsule;  Take 1 capsule by mouth Daily  -     GERD diet: avoid carbonated/acid beverages, fried and fatty foods. peppermint, chocolate, alcohol, coffee, citrus fruits and juices, tomoato products; avoid lying down for 2 to 3 hours after eating, avoid tight fitting clothing. Colonoscopy with alternatives, rationale, benefits and risks, not limited to bleeding, infection, perforation, need of surgery, prolong hospital stay and anesthesia side effects were explained. Patient verbalized understanding and agrees to proceed with colonoscopy. Orders Placed This Encounter   Procedures    COLONOSCOPY W/ OR W/O BIOPSY     Scheduling Instructions:      Schedule with anesthesia provided diprivan. Please provide prep of choice instructions and prescription. General guidelines for holding blood thinners/anticoagulants around endoscopic procedure are but patients are encouraged to check with their prescribing physician. The patient may hold Plavix, Effient, Brilinta 5 days prior to the procedure unless:       A drug eluting stent has been placed within past 12 months. A nondrug eluting stent has been placed within past 1 month. Coumadin may be held 4 days prior to the procedure unless:        Mechanical mitral valve replacement (requires heparin bridge while Coumadin held and is managed by pharmacy)      Pradaxa, Xarelto, Eliquis may be held 2-3 days prior to procedure. According to pharmacokinetics of the drug, package insert, cardiology practice patterns, and T1/2 of theses drugs (12 hrs), Eliquis and Xarelto are held 48hrs prior to any procedure, including major surgical procedures w/o       increased bleeding.  That is usually the standard of care, as coagulation would/should be normalized at 48hrs. Every attempt should be made to maintain ASA 81mg per day throughout the allen-operative period in patients with diagnosis of ASHD.        These recommendations may need to be modified by the provider/ based on risk /benefit analysis of the procedure and the patients history. If anticoagulation can not be held because recent cardiac stent, elective endoscopic procedures should be delayed until they have received the minimum duration of recommended antiplatlet therapy and it can safely be held. Again if unsure, patient should discuss with prescribing physician/service. If anticoagulation can not be stopped, endoscopic procedures can still be performed either diagnostically at a somewhat higher risk. Understand that any therapeutic procedure where anything beyond looking is performed, carries higher risks. For this reason without overt bleeding other testing       such as cologuard may be more appropriate. High risk endoscopic procedures that require stopping antiplatelet and anticoagulation therapy include polypectomy, biliary or pancreatic sphincterotomy, pneumatic or bougie dilation, PEG placement, therapeutic balloon-assisted enteroscopy, EUS and FNA, tumor ablation by any technique,       cystogastrostomy,and treatment of varices. ORDERED FUTURE/PENDING TESTS     Lab Frequency Next Occurrence       FOLLOWUP   Return if symptoms worsen or fail to improve.           Get Meade MD 8/23/21 9:52 AM EDT    CC:  Jabier Barajas MD

## 2021-08-24 ENCOUNTER — OFFICE VISIT (OUTPATIENT)
Dept: GASTROENTEROLOGY | Age: 71
End: 2021-08-24
Payer: MEDICARE

## 2021-08-24 ENCOUNTER — TELEPHONE (OUTPATIENT)
Dept: GASTROENTEROLOGY | Age: 71
End: 2021-08-24

## 2021-08-24 VITALS
BODY MASS INDEX: 21.9 KG/M2 | DIASTOLIC BLOOD PRESSURE: 65 MMHG | SYSTOLIC BLOOD PRESSURE: 110 MMHG | HEART RATE: 92 BPM | WEIGHT: 156.4 LBS | HEIGHT: 71 IN

## 2021-08-24 DIAGNOSIS — Z12.11 COLON CANCER SCREENING: Primary | ICD-10-CM

## 2021-08-24 DIAGNOSIS — F51.04 CHRONIC INSOMNIA: ICD-10-CM

## 2021-08-24 DIAGNOSIS — K21.9 GASTROESOPHAGEAL REFLUX DISEASE WITHOUT ESOPHAGITIS: ICD-10-CM

## 2021-08-24 DIAGNOSIS — K22.70 BARRETT'S ESOPHAGUS WITHOUT DYSPLASIA: ICD-10-CM

## 2021-08-24 PROCEDURE — 3017F COLORECTAL CA SCREEN DOC REV: CPT | Performed by: INTERNAL MEDICINE

## 2021-08-24 PROCEDURE — 99214 OFFICE O/P EST MOD 30 MIN: CPT | Performed by: INTERNAL MEDICINE

## 2021-08-24 PROCEDURE — 1123F ACP DISCUSS/DSCN MKR DOCD: CPT | Performed by: INTERNAL MEDICINE

## 2021-08-24 PROCEDURE — 1036F TOBACCO NON-USER: CPT | Performed by: INTERNAL MEDICINE

## 2021-08-24 PROCEDURE — G8420 CALC BMI NORM PARAMETERS: HCPCS | Performed by: INTERNAL MEDICINE

## 2021-08-24 PROCEDURE — 4040F PNEUMOC VAC/ADMIN/RCVD: CPT | Performed by: INTERNAL MEDICINE

## 2021-08-24 PROCEDURE — G8427 DOCREV CUR MEDS BY ELIG CLIN: HCPCS | Performed by: INTERNAL MEDICINE

## 2021-08-24 RX ORDER — POLYETHYLENE GLYCOL 3350 17 G/17G
238 POWDER ORAL DAILY
Qty: 255 G | Refills: 0 | Status: ON HOLD
Start: 2021-08-24 | End: 2021-10-08 | Stop reason: HOSPADM

## 2021-08-24 RX ORDER — ZOLPIDEM TARTRATE 5 MG/1
TABLET ORAL
Qty: 30 TABLET | Refills: 0 | Status: SHIPPED | OUTPATIENT
Start: 2021-08-24 | End: 2021-09-23

## 2021-08-24 RX ORDER — OMEPRAZOLE 20 MG/1
20 CAPSULE, DELAYED RELEASE ORAL DAILY
Qty: 90 CAPSULE | Refills: 1 | Status: SHIPPED | OUTPATIENT
Start: 2021-08-24 | End: 2022-03-04

## 2021-08-24 NOTE — TELEPHONE ENCOUNTER
Provider:Dr. Hermila Blunt  Has the patient been vaccinated against COVID? No  Procedure:Colonoscopy  Sedation:MAC  Type of prep:2days prep  Location:[place]   Prep instructions provided to patient during office visit

## 2021-08-24 NOTE — TELEPHONE ENCOUNTER
Last Office Visit  -  7/14/21  Next Office Visit  -      Last Filled  -  7/14/21  Last UDS -  3/8/18  Contract -  9/9/16

## 2021-08-24 NOTE — PATIENT INSTRUCTIONS
eating that food to see if your symptoms get better. · Eat smaller meals, and more often. After eating, wait 2 to 3 hours before you lie down. · Raise the head of your bed 6 to 8 inches by putting blocks under the frame or a foam wedge under the head of the mattress. · Do not wear tight clothing around your midsection. · If you are overweight, lose weight. Even losing 5 to 10 pounds can help. When should you call for help? Call your doctor now or seek immediate medical care if:    · You have new or worse belly pain.     · You are vomiting. Watch closely for changes in your health, and be sure to contact your doctor if:    · You have any pain or difficulty swallowing.     · You are losing weight.     · You have new or worse symptoms, such as heartburn.     · You do not get better as expected. Where can you learn more? Go to https://BRAND-YOURSELFpeABL Farms.City Voice. org and sign in to your [employer] account. Enter L146 in the SocialThreader box to learn more about \"Sanchez's Esophagus: Care Instructions. \"     If you do not have an account, please click on the \"Sign Up Now\" link. Current as of: February 10, 2021               Content Version: 12.9  © 2006-2021 [employer]. Care instructions adapted under license by [facility] ([facility]). If you have questions about a medical condition or this instruction, always ask your healthcare professional. [place] [address] any warranty or liability for your use of this information. Patient Education        Colon Cancer Screening: Care Instructions  Overview     Colorectal cancer occurs in the colon or rectum. That's the lower part of your digestive system. It often starts in small growths called polyps in the colon or rectum. Polyps are usually found with screening tests. Depending on the type of test, any polyps found may be removed during the tests.   Colorectal cancer usually does not cause symptoms at first. But regular tests can help find it early, before it spreads and becomes harder to treat. Your risk for colorectal cancer gets higher as you get older. Some experts say that adults should start regular screening at age 48 and stop at age 76. Others say to start before age 48 or continue after age 76. Talk with your doctor about your risk and when to start and stop screening. You may have one of several tests. Follow-up care is a key part of your treatment and safety. Be sure to make and go to all appointments, and call your doctor if you are having problems. It's also a good idea to know your test results and keep a list of the medicines you take. What are the main screening tests for colon cancer? The screening tests are:  Stool tests. These include the guaiac fecal occult blood test (gFOBT), the fecal immunochemical test (FIT), and the combined fecal immunochemical test and stool DNA test (FIT-DNA). These tests check stool samples for signs of cancer. If your test is positive, you will need to have a colonoscopy. Sigmoidoscopy. This test lets your doctor look at the lining of your rectum and the lowest part of your colon. Your doctor uses a lighted tube called a sigmoidoscope. This test can't find cancers or polyps in the upper part of your colon. In some cases, polyps that are found can be removed. But if your doctor finds polyps, you will need to have a colonoscopy to check the upper part of your colon. Colonoscopy. This test lets your doctor look at the lining of your rectum and your entire colon. The doctor uses a thin, flexible tool called a colonoscope. It can also be used to remove polyps or get a tissue sample (biopsy). A less common test is CT colonography (CTC). It's also called virtual colonoscopy. Who should be screened for colorectal cancer? Your risk for colorectal cancer gets higher as you get older. Some experts say that adults should start regular screening at age 48 and stop at age 76.  Others say to start before age 48 or continue after age 76. Talk with your doctor about your risk and when to start and stop screening. How often you need screening depends on the type of test you get:  Stool tests. Every 1 or 2 years for FIT or gFOBT. Every 3 years for sDNA, also called FIT-DNA. Tests that look inside the colon. Every 5 or 10 years for sigmoidoscopy. Every 5 years for CT colonography (virtual colonoscopy). Every 10 years for colonoscopy. Experts agree that people at higher risk may need to be tested sooner and more often. This includes people who have a strong family history of colon cancer. Talk to your doctor about which test is best for you and when to be tested. When should you call for help? Watch closely for changes in your health, and be sure to contact your doctor if:    · You have any changes in your bowel habits.     · You have any problems. Where can you learn more? Go to https://Satoris.Pyramid Screening Technology. org and sign in to your [employer] account. Enter 211 30 [id number] in the SpotterRF box to learn more about \"Colon Cancer Screening: Care Instructions. \"     If you do not have an account, please click on the \"Sign Up Now\" link. Current as of: December 17, 2020               Content Version: 12.9  © 2006-2021 Healthwise, Incorporated. Care instructions adapted under license by [facility] [address] If you have questions about a medical condition or this instruction, always ask your healthcare professional. [place] [address] any warranty or liability for your use of this information.

## 2021-08-27 ENCOUNTER — TELEPHONE (OUTPATIENT)
Dept: GASTROENTEROLOGY | Age: 71
End: 2021-08-27

## 2021-08-27 NOTE — TELEPHONE ENCOUNTER
Pt is returning your call to get scheduled for routine colonoscopy with Dr. Sanam Thornton. Thank you!

## 2021-09-09 ENCOUNTER — OFFICE VISIT (OUTPATIENT)
Dept: PULMONOLOGY | Age: 71
End: 2021-09-09
Payer: MEDICARE

## 2021-09-09 VITALS
BODY MASS INDEX: 21.7 KG/M2 | HEART RATE: 71 BPM | WEIGHT: 155 LBS | DIASTOLIC BLOOD PRESSURE: 77 MMHG | SYSTOLIC BLOOD PRESSURE: 115 MMHG | OXYGEN SATURATION: 97 % | HEIGHT: 71 IN | RESPIRATION RATE: 17 BRPM

## 2021-09-09 DIAGNOSIS — G25.81 RLS (RESTLESS LEGS SYNDROME): ICD-10-CM

## 2021-09-09 DIAGNOSIS — F51.04 PSYCHOPHYSIOLOGICAL INSOMNIA: ICD-10-CM

## 2021-09-09 DIAGNOSIS — R06.83 SNORING: Primary | ICD-10-CM

## 2021-09-09 DIAGNOSIS — R53.83 OTHER FATIGUE: ICD-10-CM

## 2021-09-09 DIAGNOSIS — G47.30 OBSERVED SLEEP APNEA: ICD-10-CM

## 2021-09-09 PROCEDURE — 99214 OFFICE O/P EST MOD 30 MIN: CPT | Performed by: NURSE PRACTITIONER

## 2021-09-09 PROCEDURE — G8427 DOCREV CUR MEDS BY ELIG CLIN: HCPCS | Performed by: NURSE PRACTITIONER

## 2021-09-09 ASSESSMENT — SLEEP AND FATIGUE QUESTIONNAIRES
ESS TOTAL SCORE: 2
NECK CIRCUMFERENCE (INCHES): 14
HOW LIKELY ARE YOU TO NOD OFF OR FALL ASLEEP IN A CAR, WHILE STOPPED FOR A FEW MINUTES IN TRAFFIC: 0
HOW LIKELY ARE YOU TO NOD OFF OR FALL ASLEEP WHILE LYING DOWN TO REST IN THE AFTERNOON WHEN CIRCUMSTANCES PERMIT: 2
HOW LIKELY ARE YOU TO NOD OFF OR FALL ASLEEP WHILE SITTING QUIETLY AFTER LUNCH WITHOUT ALCOHOL: 0
HOW LIKELY ARE YOU TO NOD OFF OR FALL ASLEEP WHILE WATCHING TV: 0
HOW LIKELY ARE YOU TO NOD OFF OR FALL ASLEEP WHEN YOU ARE A PASSENGER IN A CAR FOR AN HOUR WITHOUT A BREAK: 0
HOW LIKELY ARE YOU TO NOD OFF OR FALL ASLEEP WHILE SITTING INACTIVE IN A PUBLIC PLACE: 0
HOW LIKELY ARE YOU TO NOD OFF OR FALL ASLEEP WHILE SITTING AND READING: 0
HOW LIKELY ARE YOU TO NOD OFF OR FALL ASLEEP WHILE SITTING AND TALKING TO SOMEONE: 0

## 2021-09-09 NOTE — PATIENT INSTRUCTIONS
Sleep Hygiene. .. Tips for better sleep. .. Avoid naps. This will ensure you are sleepy at bedtime. If you have to take a nap, sleep less than 1 hour, before 3 pm.  Sleep only when sleepy; this reduces the time you are awake in bed. Regular exercise is recommended to help you deepen your sleep, but not within 4-6 hours of your bedtime. Timing of exercise is important, aim to exercise early in the morning or early afternoon. A light snack may help you fall asleep. Warm milk and foods high in the amino acid tryptophan, such as bananas, may help you to sleep  Be sure to avoid heavy, spicy or sugary foods 4-6 hours before bedtime and avoid at snack time. Stay away from stimulants such as caffeine and nicotine for at least 4-6 hours before bed. Stimulants can interfere with your ability to fall asleep. Caffeine is found in tea, cola, coffee, cocoa and chocolate and is best avoided at bedtime. Nicotine is found in tobacco products. Avoid alcohol 4-6 hours before bedtime. Alcohol has an immediate sleep-inducing effect, after a few hours when alcohol levels fall there is a stimulant or wake-up effect and will cause fragmented sleep. Sleep rituals are important. Give your body clues it is time to slow down and sleep. Examples include; yoga, deep breathing, listen to relaxing music, a hot bath or a few minutes of reading. Have a fixed bedtime and awakening time, Even on weekends! You will feel better keeping a regular sleep cycle, even if you are retired or not working. Get into your favorite sleep position. If not asleep in 30 minutes, get up and do something boring until you feel sleepy. Remember not to expose yourself to bright lights such as TV, phone or tablet screens. Only use your bed for sleeping. Do not use your bed as an office, workroom or recreation room. Use comfortable bedding. Uncomfortable bedding can prevent good sleep. Ensure your bedroom is quiet and comfortable.  A cooler room along with enough blankets to stay warm is recommended. If your room is too noisy, try a white noise machine. If too bright, try black out shades or an eye mask. Dont take worries to bed. Leave worries about work, school etc. behind you when you go to bed. Some people find it helpful to assign a worry period in the evening or late afternoon to write down your worries and get them out of your system. Address to Sleep Center: [facility] [address] [place] [address], [address] [place] [address]            Phone: [phone] Fax: [phone]    If you should need to cancel or reschedule your appointment, please call the Sleep Center at [phone] as soon as possible. We ask that you please phone the [facility] Patient Pre-Services ([phone]) at least 3-5 days prior to your sleep study to pre-register. Failing to pre-register may ultimately cause your insurance to not pay for this procedure. Please refrain from or reduce the use of caffeine and/or alcohol prior to your sleep study and avoid napping the day of your study as these will affect the accuracy of your test results.

## 2021-09-09 NOTE — PROGRESS NOTES
when circumstances permit 2   Sitting and talking to someone 0   Sitting quietly after a lunch without alcohol 0   In a car, while stopped for a few minutes in traffic 0   Total score 2   Neck circumference 14       Past Medical History:  Past Medical History:   Diagnosis Date    Sanchez esophagus 2019    Dr Erika Quiroga, f/u EGD q 3 yr    Bipolar 2 disorder, major depressive episode ([facility] [address] 2012    hypomania    BPH (benign prostatic hyperplasia)     Depression 1980    Diverticulitis     Erosive esophagitis 2019    Hyperlipidemia 2010    Vitamin D deficiency        Past Surgical History:        Procedure Laterality Date    APPENDECTOMY  age 27    COLONOSCOPY  2007    COLONOSCOPY N/A 12/3/2019    COLONOSCOPY & EGD performed by Chepe Zepeda MD at [facility]  age 4 and 6    x2    LASIK  2006    PROSTATE BIOPSY      UPPER GASTROINTESTINAL ENDOSCOPY N/A 9/24/2019    EGD performed by Chepe Zepeda MD at [facility] 12/3/2019    COLONOSCOPY & EGD performed by Chepe Zepeda MD at [facility]:  has No Known Allergies. Social History:    TOBACCO:   reports that he quit smoking about 13 years ago. He has a 12.00 pack-year smoking history. He has never used smokeless tobacco.  ETOH:   reports no history of alcohol use.     Family History:       Problem Relation Age of Onset    Mental Illness Mother         depression    High Blood Pressure Mother     Stroke Mother [de-identified]    Mental Illness Father         depression    Cancer Father 76        colorectal    Mental Illness Sister         depression    Arthritis Sister     Mental Illness Brother         depression    Arthritis Brother     Thyroid Disease Brother     Cancer Paternal Aunt         ovarian    Cancer Paternal Grandfather         lymphoma    Cancer Paternal Aunt     Diabetes Neg Hx        Current Medications:    Current Outpatient Medications:     zolpidem (AMBIEN) 5 MG tablet, TAKE ONE TABLET BY MOUTH ONCE NIGHTLY AS NEEDED FOR SLEEP, Disp: 30 tablet, Rfl: 0    omeprazole (PRILOSEC) 20 MG delayed release capsule, Take 1 capsule by mouth Daily, Disp: 90 capsule, Rfl: 1    polyethylene glycol (MIRALAX) 17 GM/SCOOP POWD powder, Take 238 g by mouth daily Take as directed for colonoscopy, Disp: 255 g, Rfl: 0    magnesium citrate solution, Take 296 mLs by mouth once for 1 dose, Disp: 296 mL, Rfl: 0    sildenafil (VIAGRA) 100 MG tablet, TAKE ONE TABLET BY MOUTH DAILY AS NEEDED FOR ERECTILE DYSFUNCTION, Disp: 6 tablet, Rfl: 1    cetirizine-psuedoephedrine (ZYRTEC-D) 5-120 MG per extended release tablet, Take 1 tablet by mouth 2 times daily, Disp: , Rfl:     venlafaxine (EFFEXOR) 75 MG tablet, Take 187 mg by mouth daily , Disp: , Rfl:     lithium 300 MG capsule, Take 600 mg by mouth every evening , Disp: , Rfl:       Review of Systems  Constitutional: Negative for fever  HENT: Negative for sore throat  Eyes: Negative for redness   Respiratory: Negative for dyspnea, cough  Cardiovascular: Negative for chest pain  Gastrointestinal: Negative for vomiting, diarrhea   Genitourinary: Negative for hematuria   Musculoskeletal: Negative forarthralgias   Skin: Negative for rash  Neurological: Negative for syncope  Hematological: Negative for adenopathy  Psychiatric/Behavorial: Negative for anxiety      Objective:   PHYSICAL EXAM:  /77   Pulse 71   Resp 17   Ht 5' 11\" (1.803 m)   Wt 155 lb (70.3 kg)   SpO2 93% Comment: RA  BMI 21.62 kg/m²     Physical Exam  Gen: No acute distress. BMI of 155  Eyes: PERRL. No sclera icterus. No conjunctival injection. ENT: No discharge. Pharynx clear. Mallampati class IV. Large tongue with ridging. Elongated uvula. Neck: Trachea midline. No obvious mass. Neck circumference 14\"  Resp: No accessory muscle use. Nocrackles. No wheezes. No rhonchi. CV: Regular rate. Regular rhythm. No murmur or rub.    Skin:

## 2021-09-09 NOTE — LETTER
[facility]  [address]  [address]  Phone: [phone]  Fax: [phone]           FritzTRINA Ely CNP      September 9, 2021     Patient: Dann Charles   MR Number: <[id number]>   YOB: 1950   Date of Visit: 9/9/2021       Dear Dr. Can Pierson: Thank you for referring Pradip Pinedo to me for evaluation/treatment. Below are the relevant portions of my assessment and plan of care. Assessment:       · Snoring  · Observed sleep apnea   · Fatigue  · Sleep onset insomniapsychophysiological hyperarousal, rule out sleep apnea likely contributing. Taking Ambien per PCP  · RLS  · Depression, anxiety, bipolar disorderfollowed by psychiatry        Plan:      · HST evaluate forsleep related breathing disorder. · Appointment after testing to discuss results  · Treatment options were discussed with patient if HST reveals RADHA, including CPAP therapy, oral appliances and upper airway surgery. · Sleep hygiene  · D/W patient non pharmacological therapy for RLS including avoiding aggravating factors (sleep deprivation, mental alerting activities at time of rest, antihistamines), moderate regular exercise, leg message and heating pads/hot shower. Could consider trial of Requip if no improvement  · Cognitive behavioral therapy was discussed with patient including stimulus control and sleep restriction   · Avoid sedatives, alcohol and caffeinated drinks at bed time. · No driving motorized vehicles or operating heavy machinery while fatigue, drowsy or sleepy. · Weight loss is also recommended as a long-term intervention. · Complications of RADHA if not treated were discussed with patient patient to include systemic hypertension, pulmonary hypertension, cardiovascular morbidities, car accidents and all cause mortality.   Discussed pathophysiology of RADHA with patient today  Patient education handout provided regarding sleep tips       If you have questions, please do not hesitate to call me. I look forward to following Teressa Cox along with you.     Sincerely,        TRINA Fraga CNP    CC providers:  TRINA Thompson CNP  [address] [place] [address] Dr Randell Beebe  [address]

## 2021-09-22 DIAGNOSIS — F51.04 CHRONIC INSOMNIA: ICD-10-CM

## 2021-09-23 RX ORDER — ZOLPIDEM TARTRATE 5 MG/1
TABLET ORAL
Qty: 30 TABLET | Refills: 0 | Status: SHIPPED | OUTPATIENT
Start: 2021-09-23 | End: 2021-10-22 | Stop reason: SDUPTHER

## 2021-10-01 DIAGNOSIS — Z01.812 PRE-PROCEDURE LAB EXAM: Primary | ICD-10-CM

## 2021-10-04 ENCOUNTER — HOSPITAL ENCOUNTER (OUTPATIENT)
Age: 71
Discharge: HOME | End: 2021-10-04
Payer: MEDICARE

## 2021-10-04 ENCOUNTER — ANESTHESIA EVENT (OUTPATIENT)
Dept: ENDOSCOPY | Age: 71
End: 2021-10-04
Payer: MEDICARE

## 2021-10-04 PROCEDURE — U0005 INFEC AGEN DETEC AMPLI PROBE: HCPCS

## 2021-10-04 PROCEDURE — U0003 INFECTIOUS AGENT DETECTION BY NUCLEIC ACID (DNA OR RNA); SEVERE ACUTE RESPIRATORY SYNDROME CORONAVIRUS 2 (SARS-COV-2) (CORONAVIRUS DISEASE [COVID-19]), AMPLIFIED PROBE TECHNIQUE, MAKING USE OF HIGH THROUGHPUT TECHNOLOGIES AS DESCRIBED BY CMS-2020-01-R: HCPCS

## 2021-10-05 LAB — SARS-COV-2: NOT DETECTED

## 2021-10-05 NOTE — PROGRESS NOTES
Obstructive Sleep Apnea (RADHA) Screening     Patient:  Rich Mckeon    YOB: 1950      Medical Record #:  [id number]                     Date:  10/5/2021     1. Are you a loud and/or regular snorer? []  Yes       [x] No    2. Have you been observed to gasp or stop breathing during sleep? []  Yes       [x] No    3. Do you feel tired or groggy upon awakening or do you awaken with a headache?           []  Yes       [] No    4. Are you often tired or fatigued during the wake time hours? []  Yes       [] No    5. Do you fall asleep sitting, reading, watching TV or driving? []  Yes       [] No    6. Do you often have problems with memory or concentration? []  Yes       [] No    **If patient's score is ? 3 they are considered high risk for RADHA. An Anesthesia provider will evaluate the patient and develop a plan of care the day of surgery. Note:  If the patient's BMI is more than 35 kg m¯² , has neck circumference > 40 cm, and/or high blood pressure the risk is greater (© American Sleep Apnea Association, 2006).

## 2021-10-08 ENCOUNTER — ANESTHESIA (OUTPATIENT)
Dept: ENDOSCOPY | Age: 71
End: 2021-10-08
Payer: MEDICARE

## 2021-10-08 ENCOUNTER — HOSPITAL ENCOUNTER (OUTPATIENT)
Age: 71
Setting detail: OUTPATIENT SURGERY
Discharge: HOME | End: 2021-10-08
Attending: INTERNAL MEDICINE | Admitting: INTERNAL MEDICINE
Payer: MEDICARE

## 2021-10-08 VITALS — OXYGEN SATURATION: 97 % | DIASTOLIC BLOOD PRESSURE: 53 MMHG | SYSTOLIC BLOOD PRESSURE: 92 MMHG

## 2021-10-08 VITALS
HEART RATE: 57 BPM | DIASTOLIC BLOOD PRESSURE: 75 MMHG | TEMPERATURE: 98.2 F | HEIGHT: 71 IN | RESPIRATION RATE: 18 BRPM | BODY MASS INDEX: 21.42 KG/M2 | OXYGEN SATURATION: 99 % | WEIGHT: 153 LBS | SYSTOLIC BLOOD PRESSURE: 109 MMHG

## 2021-10-08 PROCEDURE — G0121 COLON CA SCRN NOT HI RSK IND: HCPCS | Performed by: INTERNAL MEDICINE

## 2021-10-08 PROCEDURE — 3700000000 HC ANESTHESIA ATTENDED CARE: Performed by: INTERNAL MEDICINE

## 2021-10-08 PROCEDURE — 7100000010 HC PHASE II RECOVERY - FIRST 15 MIN: Performed by: INTERNAL MEDICINE

## 2021-10-08 PROCEDURE — 2709999900 HC NON-CHARGEABLE SUPPLY: Performed by: INTERNAL MEDICINE

## 2021-10-08 PROCEDURE — 2580000003 HC RX 258: Performed by: ANESTHESIOLOGY

## 2021-10-08 PROCEDURE — 2500000003 HC RX 250 WO HCPCS: Performed by: NURSE ANESTHETIST, CERTIFIED REGISTERED

## 2021-10-08 PROCEDURE — 6360000002 HC RX W HCPCS: Performed by: NURSE ANESTHETIST, CERTIFIED REGISTERED

## 2021-10-08 PROCEDURE — 3700000001 HC ADD 15 MINUTES (ANESTHESIA): Performed by: INTERNAL MEDICINE

## 2021-10-08 PROCEDURE — 7100000011 HC PHASE II RECOVERY - ADDTL 15 MIN: Performed by: INTERNAL MEDICINE

## 2021-10-08 PROCEDURE — 3609027000 HC COLONOSCOPY: Performed by: INTERNAL MEDICINE

## 2021-10-08 RX ORDER — PROPOFOL 10 MG/ML
INJECTION, EMULSION INTRAVENOUS PRN
Status: DISCONTINUED | OUTPATIENT
Start: 2021-10-08 | End: 2021-10-08 | Stop reason: SDUPTHER

## 2021-10-08 RX ORDER — LIDOCAINE HYDROCHLORIDE 20 MG/ML
INJECTION, SOLUTION INFILTRATION; PERINEURAL PRN
Status: DISCONTINUED | OUTPATIENT
Start: 2021-10-08 | End: 2021-10-08 | Stop reason: SDUPTHER

## 2021-10-08 RX ORDER — LIDOCAINE HYDROCHLORIDE 10 MG/ML
1 INJECTION, SOLUTION EPIDURAL; INFILTRATION; INTRACAUDAL; PERINEURAL
Status: DISCONTINUED | OUTPATIENT
Start: 2021-10-08 | End: 2021-10-08 | Stop reason: HOSPADM

## 2021-10-08 RX ORDER — SODIUM CHLORIDE 0.9 % (FLUSH) 0.9 %
10 SYRINGE (ML) INJECTION PRN
Status: DISCONTINUED | OUTPATIENT
Start: 2021-10-08 | End: 2021-10-08 | Stop reason: HOSPADM

## 2021-10-08 RX ORDER — SODIUM CHLORIDE 9 MG/ML
25 INJECTION, SOLUTION INTRAVENOUS PRN
Status: DISCONTINUED | OUTPATIENT
Start: 2021-10-08 | End: 2021-10-08 | Stop reason: HOSPADM

## 2021-10-08 RX ORDER — SODIUM CHLORIDE 0.9 % (FLUSH) 0.9 %
10 SYRINGE (ML) INJECTION EVERY 12 HOURS SCHEDULED
Status: DISCONTINUED | OUTPATIENT
Start: 2021-10-08 | End: 2021-10-08 | Stop reason: HOSPADM

## 2021-10-08 RX ORDER — SODIUM CHLORIDE, SODIUM LACTATE, POTASSIUM CHLORIDE, CALCIUM CHLORIDE 600; 310; 30; 20 MG/100ML; MG/100ML; MG/100ML; MG/100ML
INJECTION, SOLUTION INTRAVENOUS CONTINUOUS
Status: DISCONTINUED | OUTPATIENT
Start: 2021-10-08 | End: 2021-10-08 | Stop reason: HOSPADM

## 2021-10-08 RX ORDER — SODIUM CHLORIDE, SODIUM LACTATE, POTASSIUM CHLORIDE, CALCIUM CHLORIDE 600; 310; 30; 20 MG/100ML; MG/100ML; MG/100ML; MG/100ML
INJECTION, SOLUTION INTRAVENOUS ONCE
Status: DISCONTINUED | OUTPATIENT
Start: 2021-10-08 | End: 2021-10-08 | Stop reason: SDUPTHER

## 2021-10-08 RX ADMIN — SODIUM CHLORIDE, POTASSIUM CHLORIDE, SODIUM LACTATE AND CALCIUM CHLORIDE: 600; 310; 30; 20 INJECTION, SOLUTION INTRAVENOUS at 08:29

## 2021-10-08 RX ADMIN — PROPOFOL 50 MG: 10 INJECTION, EMULSION INTRAVENOUS at 09:17

## 2021-10-08 RX ADMIN — PROPOFOL 50 MG: 10 INJECTION, EMULSION INTRAVENOUS at 09:29

## 2021-10-08 RX ADMIN — PROPOFOL 50 MG: 10 INJECTION, EMULSION INTRAVENOUS at 09:15

## 2021-10-08 RX ADMIN — PROPOFOL 50 MG: 10 INJECTION, EMULSION INTRAVENOUS at 09:32

## 2021-10-08 RX ADMIN — PROPOFOL 50 MG: 10 INJECTION, EMULSION INTRAVENOUS at 09:23

## 2021-10-08 RX ADMIN — PROPOFOL 50 MG: 10 INJECTION, EMULSION INTRAVENOUS at 09:21

## 2021-10-08 RX ADMIN — PROPOFOL 50 MG: 10 INJECTION, EMULSION INTRAVENOUS at 09:18

## 2021-10-08 RX ADMIN — LIDOCAINE HYDROCHLORIDE 50 MG: 20 INJECTION, SOLUTION INFILTRATION; PERINEURAL at 09:14

## 2021-10-08 RX ADMIN — PROPOFOL 50 MG: 10 INJECTION, EMULSION INTRAVENOUS at 09:26

## 2021-10-08 ASSESSMENT — PAIN - FUNCTIONAL ASSESSMENT: PAIN_FUNCTIONAL_ASSESSMENT: 0-10

## 2021-10-08 ASSESSMENT — PAIN SCALES - GENERAL
PAINLEVEL_OUTOF10: 0

## 2021-10-08 NOTE — ANESTHESIA PRE PROCEDURE
Department of Anesthesiology  Preprocedure Note       Name:  Kristofer Gómez   Age:  73 y.o.  :  1950                                          MRN:  [id number]         Date:  10/8/2021      Surgeon: Roge Ruiz):  Eric Brownlee MD    Procedure: Procedure(s):  COLONOSCOPY    Medications prior to admission:   Prior to Admission medications    Medication Sig Start Date End Date Taking?  Authorizing Provider   zolpidem (AMBIEN) 5 MG tablet TAKE ONE TABLET BY MOUTH ONCE NIGHTLY AS NEEDED FOR SLEEP 9/23/21 10/23/21 Yes Husam Grant MD   omeprazole (PRILOSEC) 20 MG delayed release capsule Take 1 capsule by mouth Daily 21  Yes Eric Brownlee MD   polyethylene glycol (MIRALAX) 17 GM/SCOOP POWD powder Take 238 g by mouth daily Take as directed for colonoscopy 21  Yes Eric Brownlee MD   cetirizine-psuedoephedrine (ZYRTEC-D) 5-120 MG per extended release tablet Take 1 tablet by mouth as needed    Yes Historical Provider, MD   venlafaxine (EFFEXOR) 75 MG tablet Take 187 mg by mouth daily    Yes Historical Provider, MD   lithium 300 MG capsule Take 300 mg by mouth every evening    Yes Historical Provider, MD   magnesium citrate solution Take 296 mLs by mouth once for 1 dose 21  Eric Brownlee MD   sildenafil (VIAGRA) 100 MG tablet TAKE ONE TABLET BY MOUTH DAILY AS NEEDED FOR ERECTILE DYSFUNCTION 21   Rose Edgar, APRN - CNP       Current medications:    Current Facility-Administered Medications   Medication Dose Route Frequency Provider Last Rate Last Admin    lactated ringers infusion   IntraVENous Continuous Aleisha Adam  mL/hr at 10/08/21 0829 New Bag at 10/08/21 0829    sodium chloride flush 0.9 % injection 10 mL  10 mL IntraVENous 2 times per day Aleisha Adam MD        sodium chloride flush 0.9 % injection 10 mL  10 mL IntraVENous PRN Aleisha Adam MD        0.9 % sodium chloride infusion  25 mL IntraVENous PRN Aleisha Adam MD        lidocaine PF 1 % injection 1 mL  1 mL IntraDERmal Once PRN Susie Polanco MD           Allergies:  No Known Allergies    Problem List:    Patient Active Problem List   Diagnosis Code    Vitamin D deficiency E55.9    Hyperlipidemia E78.5    Bone spur M77.9    Bipolar 2 disorder ([facility] [address] F31.81    Insomnia due to medical condition G47.01    Normocytic anemia D64.9    Erosive esophagitis K22.10    Hiatal hernia K44.9    Sanchez's esophagus determined by endoscopy K22.70    Diverticulosis of colon K57.30    Gastroesophageal reflux disease with esophagitis without hemorrhage K21.00       Past Medical History:        Diagnosis Date    Acid reflux     Sanchez esophagus     Dr Barron [facility], f/u EGD q 3 yr    Bipolar 2 disorder, major depressive episode ([facility] [address]     hypomania    BPH (benign prostatic hyperplasia)     Depression 1980    Diverticulitis     Erosive esophagitis     Hyperlipidemia 2010    pt denies    Vitamin D deficiency        Past Surgical History:        Procedure Laterality Date    APPENDECTOMY  age 27    COLONOSCOPY  2007    COLONOSCOPY N/A 12/3/2019    COLONOSCOPY & EGD performed by Tyler Moran MD at [address] [place] [address]  age 4 and 5    x2    LASIK  2006    PROSTATE BIOPSY      UPPER GASTROINTESTINAL ENDOSCOPY N/A 2019    EGD performed by [facility] MD at [facility] 12/3/2019    COLONOSCOPY & EGD performed by Tyler Moran MD at [facility]. [place] [address] History:    Social History     Tobacco Use    Smoking status: Former Smoker     Packs/day: 1.00     Years: 12.00     Pack years: 12.00     Quit date: 2008     Years since quittin.7    Smokeless tobacco: Never Used   Substance Use Topics    Alcohol use: Not Currently     Alcohol/week: 20.0 standard drinks     Types: 20 Cans of beer per week     Comment: quit 2013                                Counseling given: Not Answered      Vital Signs (Current):   Vitals:    10/05/21 1141 10/08/21 0817   BP:  133/76   Pulse:  68   Resp:  14   Temp:  98.2 °F (36.8 °C)   TempSrc:  Temporal   SpO2:  98%   Weight: 155 lb (70.3 kg) 153 lb (69.4 kg)   Height: 5' 11\" (1.803 m) 5' 11\" (1.803 m)                                              BP Readings from Last 3 Encounters:   10/08/21 133/76   09/09/21 115/77   08/24/21 110/65       NPO Status: Time of last liquid consumption: 0200                        Time of last solid consumption: 1930                        Date of last liquid consumption: 10/08/21                        Date of last solid food consumption: 10/05/21    BMI:   Wt Readings from Last 3 Encounters:   10/08/21 153 lb (69.4 kg)   09/09/21 155 lb (70.3 kg)   08/24/21 156 lb 6.4 oz (70.9 kg)     Body mass index is 21.34 kg/m². CBC:   Lab Results   Component Value Date    WBC 8.7 09/11/2019    RBC 4.02 09/11/2019    HGB 12.9 09/11/2019    HCT 38.8 09/11/2019    MCV 96.5 09/11/2019    RDW 14.0 09/11/2019     09/11/2019       CMP:   Lab Results   Component Value Date     01/25/2021    K 4.6 01/25/2021     01/25/2021    CO2 26 01/25/2021    BUN 19 01/25/2021    CREATININE 1.0 01/25/2021    GFRAA >60 01/25/2021    GFRAA >60 12/20/2012    AGRATIO 1.6 01/25/2021    LABGLOM >60 01/25/2021    GLUCOSE 76 01/25/2021    PROT 7.3 01/25/2021    PROT 7.5 03/30/2012    CALCIUM 9.4 01/25/2021    BILITOT <0.2 01/25/2021    ALKPHOS 73 01/25/2021    AST 37 01/25/2021    ALT 21 01/25/2021       POC Tests: No results for input(s): POCGLU, POCNA, POCK, POCCL, POCBUN, POCHEMO, POCHCT in the last 72 hours. Coags: No results found for: PROTIME, INR, APTT    HCG (If Applicable): No results found for: PREGTESTUR, PREGSERUM, HCG, HCGQUANT     ABGs: No results found for: PHART, PO2ART, XRV9NVR, LVM2SHM, BEART, F0DMMKTZ     Type & Screen (If Applicable):  No results found for: LABABO, LABRH    Drug/Infectious Status (If Applicable):   No results found for: HIV, HEPCAB    COVID-19 Screening (If Applicable):   Lab Results   Component Value Date    COVID19 Not Detected 10/04/2021           Anesthesia Evaluation  Patient summary reviewed and Nursing notes reviewed  Airway: Mallampati: I  TM distance: >3 FB   Neck ROM: full  Mouth opening: > = 3 FB Dental: normal exam         Pulmonary:Negative Pulmonary ROS and normal exam  breath sounds clear to auscultation                             Cardiovascular:Negative CV ROS            Rhythm: regular  Rate: normal                    Neuro/Psych:   (+) psychiatric history:depression/anxiety             GI/Hepatic/Renal:   (+) hiatal hernia, GERD:, PUD,           Endo/Other: Negative Endo/Other ROS                    Abdominal:             Vascular: negative vascular ROS. Other Findings:             Anesthesia Plan      MAC     ASA 2       Induction: intravenous. Anesthetic plan and risks discussed with patient. Plan discussed with CRNA.                   Marine Huff MD   10/8/2021

## 2021-10-08 NOTE — ANESTHESIA POSTPROCEDURE EVALUATION
Department of Anesthesiology  Postprocedure Note    Patient: Patricia Akhtar  MRN: [id number]  YOB: 1950  Date of evaluation: 10/8/2021  Time:  12:29 PM     Procedure Summary     Date: 10/08/21 Room / Location: [address] [place] [address]    Anesthesia Start: 0911 Anesthesia Stop: 7534    Procedure: COLONOSCOPY (N/A ) Diagnosis:       Colon cancer screening      (Colon cancer screening [Z12.11])    Surgeons: Kain Haney MD Responsible Provider: Renato Allen MD    Anesthesia Type: MAC ASA Status: 2          Anesthesia Type: MAC    Leyla Phase I: Leyla Score: 10    Leyla Phase II: Leyla Score: 10    Last vitals: Reviewed and per EMR flowsheets.        Anesthesia Post Evaluation    Patient location during evaluation: PACU  Patient participation: complete - patient participated  Level of consciousness: awake and alert  Pain score: 0  Airway patency: patent  Nausea & Vomiting: no nausea and no vomiting  Complications: no  Cardiovascular status: blood pressure returned to baseline  Respiratory status: acceptable  Hydration status: stable

## 2021-10-08 NOTE — OP NOTE
[address] Brown[address] [facility]  Phone: [phone] [facility] [address]  Phone: [phone]   CLF:[phone]    Colonoscopy Procedure Note    Patient: Winsome Ballard  : 1950    Procedure: Colonoscopy --screening    Date:  10/8/2021     Endoscopist:  Anupama Harrison MD    Referring Physician:  Caleb Juarez MD    Preoperative Diagnosis:  Colon cancer screening [Z12.11]    Postoperative Diagnosis:  See impression    Anesthesia: Anesthesia: MAC  Sedation:  Propofol per anesthesia  Start Time:   Stop Time: 326  ASA Class: 3  Mallampati: III (soft palate, base of uvula visible)    Indications: This is a 73y.o. 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 Last Colonoscopy 12/3/2019 with Dr. Gill Gilman- Chelsey prep. Severe diverticulosis in the sigmoid and descending colon. Procedure Details  Informed consent was obtained for the procedure, including sedation. Risks of perforation, hemorrhage, adverse drug reaction and aspiration were discussed. The patient was placed in the left lateral decubitus position. Based on the pre-procedure assessment, including review of the patient's medical history, medications, allergies, and review of systems, he had been deemed to be an appropriate candidate for sedation; he was therefore sedated with the medications listed below. The patient was monitored continuously with ECG tracing, pulse oximetry, blood pressure monitoring, and direct observations. rectal examination was performed. There were no external hemorrhoids, fissures or skin tags. The colonoscope was inserted into the rectum and advanced under direct vision to the terminal ileum.   The right colon was examined twice as this increases polyp detection especially if other right colon polyps, older age, male, or neff syndrome. When segments could not be distended with CO2, it was filled/distended with water. The quality of the colonic preparation was good. A careful inspection was made as the colonoscope was withdrawn, including a retroflexed view of the rectum; findings and interventions are described below. Appropriate photodocumentation Was Obtained: terminal ileum, appendiceal orifice and ileocecal valve. Findings:  Weak anal sphincter tone. Medium sized non bleeding diverticula in the sigmoid and descending colon. Normal appearing terminal ileum. Medium sized non bleeding internal hemorrhoids. Otherwise normal appearing colon mucosa. - PREP: Miralax, Mag Citrate and Dulcolax  - Overall difficulty: mild in degree  - Abdominal pressure: yes - left lower quadrant  - Change in position: no  - Anesthesia issues: no  - Endocoff/Amplieye use: no    Specimens: Was Not Obtained    Complications:   None; patient tolerated the procedure well. Disposition:   PACU - hemodynamically stable. Estimated Blood loss:  none    Withdrawal Time:  8 minutes    Impression:   Weak anal sphincter tone. Medium sized non bleeding diverticula in the sigmoid and descending colon. Normal appearing terminal ileum. Medium sized non bleeding internal hemorrhoids. Recommendations:  -Repeat colonoscopy in 10 years. ,   -High fiber diet. ,   -Follow up with primary care physician.         Annette Alba MD 10/8/21 9:36 AM EDT

## 2021-10-12 ENCOUNTER — HOSPITAL ENCOUNTER (OUTPATIENT)
Dept: SLEEP CENTER | Age: 71
Discharge: HOME | End: 2021-10-14
Payer: MEDICARE

## 2021-10-12 DIAGNOSIS — G25.81 RLS (RESTLESS LEGS SYNDROME): ICD-10-CM

## 2021-10-12 DIAGNOSIS — F51.04 PSYCHOPHYSIOLOGICAL INSOMNIA: ICD-10-CM

## 2021-10-12 DIAGNOSIS — R06.83 SNORING: ICD-10-CM

## 2021-10-12 DIAGNOSIS — R53.83 OTHER FATIGUE: ICD-10-CM

## 2021-10-12 DIAGNOSIS — G47.30 OBSERVED SLEEP APNEA: ICD-10-CM

## 2021-10-12 PROCEDURE — 95806 SLEEP STUDY UNATT&RESP EFFT: CPT

## 2021-10-13 ENCOUNTER — TELEPHONE (OUTPATIENT)
Dept: PULMONOLOGY | Age: 71
End: 2021-10-13

## 2021-10-13 PROCEDURE — 95806 SLEEP STUDY UNATT&RESP EFFT: CPT | Performed by: INTERNAL MEDICINE

## 2021-10-13 NOTE — TELEPHONE ENCOUNTER
Patient called cancelled 40min appt with [facility] for 10/14/21. He just completed HST last night. Please advise when to juan?

## 2021-10-21 DIAGNOSIS — F51.04 CHRONIC INSOMNIA: ICD-10-CM

## 2021-10-21 NOTE — TELEPHONE ENCOUNTER
Pt called for refill on Ambien, was notified that the note on the medication states he needs an appt ASAP. Pt made one for 11/1 but will be out of meds this weekend, would like to know if we can send some to his pharm to hold him over til his appt.

## 2021-10-22 RX ORDER — ZOLPIDEM TARTRATE 5 MG/1
TABLET ORAL
Qty: 30 TABLET | Refills: 0 | Status: SHIPPED | OUTPATIENT
Start: 2021-10-22 | End: 2021-11-18 | Stop reason: SDUPTHER

## 2021-10-22 NOTE — TELEPHONE ENCOUNTER
Received call from patient following up. Patient states that he is now out of medication and is in need of a refill.

## 2021-10-22 NOTE — TELEPHONE ENCOUNTER
Last Office Visit  -  7/14/21- TL  Next Office Visit  -  11/1/21    Last Filled  -  9/2321  Last UDS -  3/8/18  Contract - 8/25/6

## 2021-11-01 ENCOUNTER — OFFICE VISIT (OUTPATIENT)
Dept: FAMILY MEDICINE CLINIC | Age: 71
End: 2021-11-01
Payer: MEDICARE

## 2021-11-01 VITALS
DIASTOLIC BLOOD PRESSURE: 64 MMHG | BODY MASS INDEX: 21.7 KG/M2 | SYSTOLIC BLOOD PRESSURE: 120 MMHG | WEIGHT: 155 LBS | HEIGHT: 71 IN | TEMPERATURE: 97.5 F

## 2021-11-01 DIAGNOSIS — K11.7 XEROSTOMIA: ICD-10-CM

## 2021-11-01 DIAGNOSIS — R06.83 SNORES: ICD-10-CM

## 2021-11-01 DIAGNOSIS — Z23 NEEDS FLU SHOT: ICD-10-CM

## 2021-11-01 DIAGNOSIS — F31.81 BIPOLAR 2 DISORDER (HCC): ICD-10-CM

## 2021-11-01 DIAGNOSIS — F51.04 CHRONIC INSOMNIA: Primary | ICD-10-CM

## 2021-11-01 PROCEDURE — G8427 DOCREV CUR MEDS BY ELIG CLIN: HCPCS | Performed by: FAMILY MEDICINE

## 2021-11-01 PROCEDURE — 4040F PNEUMOC VAC/ADMIN/RCVD: CPT | Performed by: FAMILY MEDICINE

## 2021-11-01 PROCEDURE — 90694 VACC AIIV4 NO PRSRV 0.5ML IM: CPT | Performed by: FAMILY MEDICINE

## 2021-11-01 PROCEDURE — G8420 CALC BMI NORM PARAMETERS: HCPCS | Performed by: FAMILY MEDICINE

## 2021-11-01 PROCEDURE — 3017F COLORECTAL CA SCREEN DOC REV: CPT | Performed by: FAMILY MEDICINE

## 2021-11-01 PROCEDURE — 1036F TOBACCO NON-USER: CPT | Performed by: FAMILY MEDICINE

## 2021-11-01 PROCEDURE — 1123F ACP DISCUSS/DSCN MKR DOCD: CPT | Performed by: FAMILY MEDICINE

## 2021-11-01 PROCEDURE — G0008 ADMIN INFLUENZA VIRUS VAC: HCPCS | Performed by: FAMILY MEDICINE

## 2021-11-01 PROCEDURE — 99214 OFFICE O/P EST MOD 30 MIN: CPT | Performed by: FAMILY MEDICINE

## 2021-11-01 PROCEDURE — G8484 FLU IMMUNIZE NO ADMIN: HCPCS | Performed by: FAMILY MEDICINE

## 2021-11-01 RX ORDER — ARIPIPRAZOLE 2 MG/1
2 TABLET ORAL DAILY
COMMUNITY
Start: 2021-10-29

## 2021-11-01 NOTE — PROGRESS NOTES
Assessment/Plan:    Mario Alberto Hutton was seen today for medication refill. Diagnoses and all orders for this visit:    Chronic insomnia   Likely 2/2 BPD. Cont low dose ambien prn. Needs flu shot  -     INFLUENZA, QUADV, ADJUVANTED, 65 YRS =, IM, PF, PREFILL SYR, 0.5ML (FLUAD)    Bipolar 2 disorder (HCC)   Pt is doing better with med changes as below. Pt conts with Dr Maribeth Deleon but does not have therapist. Tone Vokierra emotionally healthy and that he does not need counseling at this time. Pt will consider using light box on timer. Xerostomia   Possibly 2/2 meds or perhaps 2/2 covid infx spring 2021. Snores   To see Luly Rae soon for sleep study results. Patient Instructions   Please consider getting the Shingles and covid vaccines at your local pharmacy. Please look at [employer] products at your pharmacy. Patient: Olivia Jensen is a 73 y.o.male who presents today with the following Chief Complaint(s):  Chief Complaint   Patient presents with    Medication Refill         HPI: Pt with hld, bpd (Dr Percy Don), chronic insomnia, erosive esophagitis 2019 (Dr Becki Rene) had repeat EGD 12/4/19 and Barretts esoph was found. Pt conts to avoid etoh since 2014. Conts bid omep 20 and is minimizing the 4 Cs (caffeine, citrus, chocolate, carbonation). No epigastric pain. Next EGD 2022. Had 10/8/21 cscope, nl except small hemorrhoid, f/u in 10 yr.      Is due for ambien rf, takes on avg 5 nights per wk, sometimes may take 1/2 pill to minimize use. Denies LUIS EDUARDO. Tried melatonin, not helpful. At 1/2021 appt, light box for winter depression was discussed, and vit D 2000 iu qd was rec'd. Listens to podcasts by Dr Stephania Choi, medical . She has helped pt to increase greens in diet. She rec's vit D 4000 iu qd, pt is taking. Tried light box in past, will consider again. Mood has been stable. Dr Maribeth Deleon recently decreased Li 600 to 300 qd  bc hand coordination was decreased. Abilify 2 mg qhs was added.  Effexor was decreased from 150+37.5 to just 150 mg qd. Coordination has improved. Had home sleep study recently. Preliminarily, pt was told test looks good. To have f/u soon with Jennifer Itzel. Had mild cough x 24 hr and fatigue x 5 day 3/2021. Had covid titer at [facility], pos. In past few mo's, mouth has been dry carlos in am, worse upon awaking. Can no longer tolerate spicy foods as he did in the past. No relationship to working out, using sauna. Is well hydrated, drinks water throughout ea day, more when working out. Had 16 cavities at one time as child, has had many fillings replaced, crowns. Has adequate saliva. Current Outpatient Medications   Medication Sig Dispense Refill    ARIPiprazole (ABILIFY) 2 MG tablet       zolpidem (AMBIEN) 5 MG tablet TAKE ONE TABLET BY MOUTH ONCE NIGHTLY AS NEEDED FOR SLEEP 30 tablet 0    omeprazole (PRILOSEC) 20 MG delayed release capsule Take 1 capsule by mouth Daily 90 capsule 1    sildenafil (VIAGRA) 100 MG tablet TAKE ONE TABLET BY MOUTH DAILY AS NEEDED FOR ERECTILE DYSFUNCTION 6 tablet 1    cetirizine-psuedoephedrine (ZYRTEC-D) 5-120 MG per extended release tablet Take 1 tablet by mouth as needed       venlafaxine (EFFEXOR) 75 MG tablet Take 187 mg by mouth daily       lithium 300 MG capsule Take 300 mg by mouth every evening        No current facility-administered medications for this visit. Patient's past medical history,surgical history, family history, medications,  and allergies  were all reviewed and updated as appropriate today. Review of Systems  abv    Physical Exam  Constitutional:       Appearance: Normal appearance. He is well-developed. HENT:      Head: Normocephalic and atraumatic. Right Ear: External ear normal.      Left Ear: External ear normal.      Mouth/Throat:      Pharynx: Oropharynx is clear. Comments: Mouth is mildly dry though some saliva is visible. Eyes:      General: No scleral icterus. Right eye: No discharge. Left eye: No discharge. Extraocular Movements: Extraocular movements intact. Conjunctiva/sclera: Conjunctivae normal.   Neck:      Comments: No visualized masses  FROM  Pulmonary:      Effort: Pulmonary effort is normal.   Musculoskeletal:         General: Normal range of motion. Skin:     General: Skin is warm and dry. Neurological:      General: No focal deficit present. Mental Status: He is alert and oriented to person, place, and time. Psychiatric:         Mood and Affect: Mood normal.         Behavior: Behavior normal.         Thought Content:  Thought content normal.         Judgment: Judgment normal.           /64   Temp 97.5 °F (36.4 °C) (Temporal)   Ht 5' 11\" (1.803 m)   Wt 155 lb (70.3 kg)   BMI 21.62 kg/m²

## 2021-11-01 NOTE — PATIENT INSTRUCTIONS
Please consider getting the Shingles and covid vaccines at your local pharmacy. Please look at [employer] products at your pharmacy.

## 2021-11-05 ENCOUNTER — PATIENT MESSAGE (OUTPATIENT)
Dept: FAMILY MEDICINE CLINIC | Age: 71
End: 2021-11-05

## 2021-11-05 NOTE — TELEPHONE ENCOUNTER
From: Alfreda Vargas Guard  To: Karen Farooq MD  Sent: 11/5/2021 9:42 AM EDT  Subject: Non-Urgent Medical Question    HI Dr. Fariha Yang, hope your day is going well. Thanks for your time and craig  during my appt on Monday. I am taking the Abilify at bedtime and that took care of the sleepliness I was having with the first few days. Works much better in the evening and maybe it will help with the sleep like you noted. I am thinking about getting the J&J vaccine. How long should I wait after my Flu shot I got on Monday, Nov 1? Thats it, be well and don't forget to breathe! P. Sl here is my \"health guru\" I told you about, No replacement for you and your team:)     her web site:   [email or web address]    soledad talk:   [email or web address]          Best Regards - Eloisa Kruger

## 2021-11-12 ENCOUNTER — VIRTUAL VISIT (OUTPATIENT)
Dept: PULMONOLOGY | Age: 71
End: 2021-11-12
Payer: MEDICARE

## 2021-11-12 ENCOUNTER — TELEPHONE (OUTPATIENT)
Dept: PULMONOLOGY | Age: 71
End: 2021-11-12

## 2021-11-12 DIAGNOSIS — R53.83 OTHER FATIGUE: ICD-10-CM

## 2021-11-12 DIAGNOSIS — F51.04 PSYCHOPHYSIOLOGICAL INSOMNIA: ICD-10-CM

## 2021-11-12 DIAGNOSIS — G47.33 MILD OBSTRUCTIVE SLEEP APNEA: Primary | ICD-10-CM

## 2021-11-12 DIAGNOSIS — R06.83 SNORING: ICD-10-CM

## 2021-11-12 DIAGNOSIS — F31.81 BIPOLAR 2 DISORDER (HCC): ICD-10-CM

## 2021-11-12 PROCEDURE — 1123F ACP DISCUSS/DSCN MKR DOCD: CPT | Performed by: NURSE PRACTITIONER

## 2021-11-12 PROCEDURE — 3017F COLORECTAL CA SCREEN DOC REV: CPT | Performed by: NURSE PRACTITIONER

## 2021-11-12 PROCEDURE — 4040F PNEUMOC VAC/ADMIN/RCVD: CPT | Performed by: NURSE PRACTITIONER

## 2021-11-12 PROCEDURE — G8427 DOCREV CUR MEDS BY ELIG CLIN: HCPCS | Performed by: NURSE PRACTITIONER

## 2021-11-12 PROCEDURE — 99213 OFFICE O/P EST LOW 20 MIN: CPT | Performed by: NURSE PRACTITIONER

## 2021-11-12 ASSESSMENT — SLEEP AND FATIGUE QUESTIONNAIRES
HOW LIKELY ARE YOU TO NOD OFF OR FALL ASLEEP WHEN YOU ARE A PASSENGER IN A CAR FOR AN HOUR WITHOUT A BREAK: 0
HOW LIKELY ARE YOU TO NOD OFF OR FALL ASLEEP WHILE SITTING QUIETLY AFTER LUNCH WITHOUT ALCOHOL: 0
HOW LIKELY ARE YOU TO NOD OFF OR FALL ASLEEP WHILE SITTING AND TALKING TO SOMEONE: 0
HOW LIKELY ARE YOU TO NOD OFF OR FALL ASLEEP WHILE SITTING AND READING: 0
HOW LIKELY ARE YOU TO NOD OFF OR FALL ASLEEP WHILE WATCHING TV: 0
HOW LIKELY ARE YOU TO NOD OFF OR FALL ASLEEP WHILE LYING DOWN TO REST IN THE AFTERNOON WHEN CIRCUMSTANCES PERMIT: 1
ESS TOTAL SCORE: 1
HOW LIKELY ARE YOU TO NOD OFF OR FALL ASLEEP WHILE SITTING INACTIVE IN A PUBLIC PLACE: 0
HOW LIKELY ARE YOU TO NOD OFF OR FALL ASLEEP IN A CAR, WHILE STOPPED FOR A FEW MINUTES IN TRAFFIC: 0

## 2021-11-12 NOTE — TELEPHONE ENCOUNTER
Within this Telehealth Consent, the terms you and yours refer to the person using the Telehealth Service (Service), or in the case of a use of the Service by or on behalf of a minor, you and yours refer to and include (i) the parent or legal guardian who provides consent to the use of the Service by such minor or uses the Service on behalf of such minor, and (ii) the minor for whom consent is being provided or on whose behalf the Service is being utilized. When using Service, you will be consulting with your health care providers via the use of Telehealth.   Telehealth involves the delivery of healthcare services using electronic communications, information technology or other means between a healthcare provider and a patient who are not in the same physical location. Telehealth may be used for diagnosis, treatment, follow-up and/or patient education, and may include, but is not limited to, one or more of the following:    Electronic transmission of medical records, photo images, personal health information or other data between a patient and a healthcare provider    Interactions between a patient and healthcare provider via audio, video and/or data communications    Use of output data from medical devices, sound and video files    Anticipated Benefits   The use of Telehealth by your Provider(s) through the Service may have the following possible benefits:    Making it easier and more efficient for you to access medical care and treatment for the conditions treated by such Provider(s) utilizing the Service    Allowing you to obtain medical care and treatment by Provider(s) at times that are convenient for you    Enabling you to interact with Provider(s) without the necessity of an in-office appointment     Possible Risks   While the use of Telehealth can provide potential benefits for you, there are also potential risks associated with the use of Telehealth.  These risks include, but may not be limited to the following:    Your Provider(s) may not able to provide medical treatment for your particular condition and you may be required to seek alternative healthcare or emergency care services.  The electronic systems or other security protocols or safeguards used in the Service could fail, causing a breach of privacy of your medical or other information.  Given regulatory requirements in certain jurisdictions, your Provider(s) diagnosis and/or treatment options, especially pertaining to certain prescriptions, may be limited. Acceptance   1. You understand that Services will be provided via Telehealth. This process involves the use of HIPAA compliant and secure, real-time audio-visual interfacing with a qualified and appropriately trained provider located at [facility]. 2. You understand that, under no circumstances, will this session be recorded. 3. You understand that the Provider(s) at [facility] and other clinical participants will be party to the information obtained during the Telehealth session in accordance with best medical practices. 4. You understand that the information obtained during the Telehealth session will be used to help determine the most appropriate treatment options. 5. You understand that You have the right to revoke this consent at any point in time. 6. You understand that Telehealth is voluntary, and that continued treatment is not dependent upon consent. 7. You understand that, in the event of non-consent to Telehealth services and/or technical difficulties, you will obtain services as typically provided in the absence of Telehealth technology. 8. You understand that this consent will be kept in Your medical record. 9. No potential benefits from the use of Telehealth or specific results can be guaranteed. Your condition may not be cured or improved and, in some cases, may get worse.    10. There are limitations in the provision of medical care and treatment via Telehealth and the Service and you may not be able to receive diagnosis and/or treatment through the Service for every condition for which you seek diagnosis and/or treatment. 11. There are potential risks to the use of Telehealth, including but not limited to the risks described in this Telehealth Consent. 12. Your Provider(s) have discussed the use of Telehealth and the Service with you, including the benefits and risks of such and you have provided oral consent to your Provider(s) for the use of Telehealth and the Service. 15. You understand that it is your duty to provide your Provider(s) truthful, accurate and complete information, including all relevant information regarding care that you may have received or may be receiving from other healthcare providers outside of the Service. 14. You understand that each of your Provider(s) may determine in his or sole discretion that your condition is not suitable for diagnosis and/or treatment using the Service, and that you may need to seek medical care and treatment a specialist or other healthcare provider, outside of the Service. 15. You understand that you are fully responsible for payment for all services provided by Provider(s) or through use of the Service and that you may not be able to use third-party insurance. 16. You represent that (a) you have read this Telehealth Consent carefully, (b) you understand the risks and benefits of the Service and the use of Telehealth in the medical care and treatment provided to you by Provider(s) using the Service, and (c) you have the legal capacity and authority to provide this consent for yourself and/or the minor for which you are consenting under applicable federal and state laws, including laws relating to the age of [de-identified] and/or parental/guardian consent.    17. You give your informed consent to the use of Telehealth by Provider(s) using the Service under the terms described in the Terms of Service and this Telehealth Consent. The patient was read the following statement and has consented to the visit as of 11/12/21. The patient has been scheduled for their first telehealth visit on 11/12/21 with Giana Bar.

## 2021-11-12 NOTE — PROGRESS NOTES
Patient ID: Scout Woody is a 73 y.o. male who is being seen today for   Chief Complaint   Patient presents with    Results     discuss sleep study results     Referring: TREVER Cedillo    HPI:     Scout Woody is a 73 y.o. male for televideo appointment via video and audio doxy. me virtual visit for RADHA follow up. HST was reviewed by me and noted below. It showed mild RADHA. Results were dicussed with patient and multiple good questions were answered. States he is unsure if he wants to proceed with CPAP/treatment      Denies daytime sleepiness. No nodding off when driving. Some fatigue better with medication adjustment. Bedtime is 1130-MN and rise time is 7 am. Sleep onset is 30-60 minutes reads. Wakes up 1 times at night total. 1 nocturia. It takes few minutes to fall back a sleep. No naps during the day. Occasional headache in am. ESS is 1      Initial HPI 9/9/21  Scout Woody is a 73 y.o. male in office for snoring/insomnia evaluation. Patient reports snoring at night for the past 5 years. Worse in supine position. Has witnessed apnea. No restorative sleep. +dry mouth upon awakening. Sometimes fatigue and tiredness during the day. No history of sleep apnea. Bedtime  pm and rise time is 7 am. It takes 30-60 minutes to fall asleep- reads in bed, goes downstairs reads, has breathing. No TV in bedroom. Takes Ambien per PCP. 2-3 times a week takes more than 30 minutes to fall asleep, was better when taking 10 mg Ambien but felt groggy. 1 nocturia. Wakes up 1 times at night. It takes usually few minutes to fall back a sleep. Takes occasional nap during the day ( 20-30 minutes). No headache in am. No car wrecks or near wrecks because of the sleepiness. No nodding off while driving. No weight gain. Minimal  forgetfulness no decreased concentration. Drinks 2-3 caffinated beverages per day usually in the morning. No significant alcohol. Sometimes restless feelings in legs at night.   States he did try a medication 20 years ago states with no benefit.  +teeth grinding. No nightmares. No sleep walking. No night time panic attacks. No narcotics. No drug abuse. +history of depression, +history of anxiety, bipolar disorder states feels well controlled controled. No history of atrial fibrillation. No history of DM. No history of HTN. No history of ischemic heart disease. No history of stroke. ESS is 2. No smoking. No FH for RADHA, RLS or narcolepsy.      Sleep Medicine 11/12/2021 9/9/2021   Sitting and reading 0 0   Watching TV 0 0   Sitting, inactive in a public place (e.g. a theatre or a meeting) 0 0   As a passenger in a car for an hour without a break 0 0   Lying down to rest in the afternoon when circumstances permit 1 2   Sitting and talking to someone 0 0   Sitting quietly after a lunch without alcohol 0 0   In a car, while stopped for a few minutes in traffic 0 0   Total score 1 2   Neck circumference - 14       Past Medical History:  Past Medical History:   Diagnosis Date    Acid reflux     Sanchez esophagus 2019    Dr Marcin Early, f/u EGD q 3 yr    Bipolar 2 disorder, major depressive episode ([facility] [address] 2012    hypomania    BPH (benign prostatic hyperplasia)     Depression 1980    Diverticulitis     Erosive esophagitis 2019    Hyperlipidemia 2010    pt denies    Vitamin D deficiency        Past Surgical History:        Procedure Laterality Date    APPENDECTOMY  age 27    COLONOSCOPY  2007    COLONOSCOPY N/A 12/3/2019    COLONOSCOPY & EGD performed by Janine Fitch MD at [place] [address] 10/8/2021    COLONOSCOPY performed by Tabatha Olsen MD at [address] [place] [address]  age 4 and 6    x2    LASIK  2006    PROSTATE BIOPSY      UPPER GASTROINTESTINAL ENDOSCOPY N/A 9/24/2019    EGD performed by Janine Fitch MD at [place] [address] 12/3/2019    COLONOSCOPY & EGD performed by Janine Fitch MD at [facility] END[facility]       Allergies:  has No Known Allergies. Social History:    TOBACCO:   reports that he quit smoking about 13 years ago. He has a 12.00 pack-year smoking history. He has never used smokeless tobacco.  ETOH:   reports previous alcohol use of about 20.0 standard drinks of alcohol per week. Family History:       Problem Relation Age of Onset    Mental Illness Mother         depression    High Blood Pressure Mother     Stroke Mother [de-identified]    Mental Illness Father         depression    Cancer Father 76        colorectal    Mental Illness Sister         depression    Arthritis Sister     Mental Illness Brother         depression    Arthritis Brother     Thyroid Disease Brother     Cancer Paternal Aunt         ovarian    Cancer Paternal Grandfather         lymphoma    Cancer Paternal Aunt     Diabetes Neg Hx        Current Medications:    Current Outpatient Medications:     ARIPiprazole (ABILIFY) 2 MG tablet, , Disp: , Rfl:     zolpidem (AMBIEN) 5 MG tablet, TAKE ONE TABLET BY MOUTH ONCE NIGHTLY AS NEEDED FOR SLEEP, Disp: 30 tablet, Rfl: 0    omeprazole (PRILOSEC) 20 MG delayed release capsule, Take 1 capsule by mouth Daily, Disp: 90 capsule, Rfl: 1    sildenafil (VIAGRA) 100 MG tablet, TAKE ONE TABLET BY MOUTH DAILY AS NEEDED FOR ERECTILE DYSFUNCTION, Disp: 6 tablet, Rfl: 1    cetirizine-psuedoephedrine (ZYRTEC-D) 5-120 MG per extended release tablet, Take 1 tablet by mouth as needed , Disp: , Rfl:     venlafaxine (EFFEXOR) 75 MG tablet, Take 187 mg by mouth daily , Disp: , Rfl:     lithium 300 MG capsule, Take 300 mg by mouth every evening , Disp: , Rfl:       Review of Systems        Objective:   PHYSICAL EXAM:  There were no vitals taken for this visit. Physical Exam  Exam:  Gen: No acute distress, does not appear to be in pain. Appears well developed and nourished. HENT: Head is normocephalic and atraumatic. Normal appearing nose.  External Ears normal.   Neck: No visualized mass. Trachea is midline   Eyes: EOM intact. No visible discharge. Resp:No visualized signs of difficulty breathing or respiratory distress, speaking in full sentences. Respiratory effort normal.  Neuro: Awake. Alert. Able to follow commands. No facial asymmetry. Skin: No significant lesions or discoloration noted on facial skin    Musculoskeletal: Normal range of motion of the neck. Psych: Oriented x 3. No anxiety. Normal affect. DATA:   10/12/2021 HST AHI 6.1, low SPO2 83%    Assessment:       · Mild RADHA  · Snoring  · Observed sleep apnea   · Fatigue  · Sleep onset insomniapsychophysiological hyperarousal, rule out sleep apnea likely contributing. Taking Ambien per PCP  · RLS  · Depression, anxiety, bipolar disorderfollowed by psychiatry        Plan:      · Treatment options were discussed with patient for RADHA, including CPAP/APAP therapy, oral appliances and upper airway surgery. · Recommend trial of auto CPAP 6-16 cm H2O. Patient states he wants to think about it and will call office back to let us know how he would like to proceed  Advised to use CPAP 6-8 hrs at night and during naps. Replacement of mask, tubing, head straps every 3-6 months or sooner if damaged. Patient instructed to contact [employer] company for any mask, tubing or machine trouble shooting if problems arise. · Sleep hygiene  · D/W patient non pharmacological therapy for RLS including avoiding aggravating factors (sleep deprivation, mental alerting activities at time of rest, antihistamines), moderate regular exercise, leg message and heating pads/hot shower. Could consider trial of Requip if no improvement  · Cognitive behavioral therapy was discussed with patient including stimulus control and sleep restriction   · Avoid sedatives, alcohol and caffeinated drinks at bed time. · No driving motorized vehicles or operating heavy machinery while fatigue, drowsy or sleepy.    · Weight loss is also recommended as a long-term intervention. · Complications of RADHA if not treated were discussed with patient patient to include systemic hypertension, pulmonary hypertension, cardiovascular morbidities, car accidents and all cause mortality. Discussed pathophysiology of RADHA with patient today  Patient education provided regarding sleep tips    Consent for telehealth visit was obtained and is noted in chart      THIS VISIT WAS COMPLETED VIRTUALLY USING [employer]. Emeka Lizama is a 73 y.o. male being evaluated by a Virtual Visit (video visit) encounter to address concerns as mentioned above. A caregiver was present when appropriate. Due to this being a TeleHealth encounter (During YQNSV-26 public health emergency), evaluation of the following organ systems was limited: Vitals/Constitutional/EENT/Resp/CV/GI//MS/Neuro/Skin/Heme-Lymph-Imm. Pursuant to the emergency declaration under the [address] [place] [address], [address] [place] [address] authority and the [employer] and [employer] Act, this Virtual Visit was conducted with patient's (and/or legal guardian's) consent, to reduce the patient's risk of exposure to COVID-19 and provide necessary medical care. The patient (and/or legal guardian) has also been advised to contact this office for worsening conditions or problems, and seek emergency medical treatment and/or call 911 if deemed necessary. Patient identification was verified at the start of the visit: Yes    Total time spent for this encounter: Not billed by time    Services were provided through a video synchronous discussion virtually to substitute for in-person clinic visit. Patient and provider were located at their individual homes. --TRINA Chavira CNP on 11/12/2021 at 3:37 PM    An electronic signature was used to authenticate this note.

## 2021-11-12 NOTE — PATIENT INSTRUCTIONS

## 2021-11-19 NOTE — TELEPHONE ENCOUNTER
Patient called with message left for patient to call back to office. ATTENDING ATTESTATION    Patient seen and examined at approximately 2000 on 5/23, with parent and residents  at bedside.   I have reviewed the History, Physical Exam, Assessment and Plan as written the above resident. I have edited where appropriate.    T(C): 37.3, Max: 37.8 (05-23-18 @ 17:15)  HR: 112 (101 - 147)  BP: 108/50 (101/53 - 115/56)  RR: 22 (22 - 42)  SpO2: 95% (92% - 99%)    PHYSICAL EXAM  General:	              alert, neither acutely nor chronically ill-appearing, well developed/well nourished, no respiratory distress  Eyes:		no conjunctival injection, no discharge, no photophobia, intact   		extraocular movements, sclera not icteric	  ENT:		normal tympanic membranes; external ear normal, nares +clear discharge, no pharyngeal erythema or exudates, no oral mucosal lesions, normal tongue and lips	  Neck:		supple, full range of motion, no nuchal rigidity  Lymph Nodes:	normal size and consistency, non-tender  Cardiovascular	 regular rate and variability; Normal S1, S2; No murmur  Respiratory:	(examined at 2.5 hours after albuterol) no wheezing or crackles, bilateral audible breath sounds, no retractions  Abdominal:           non-distended; +BS, soft, non-tender; no hepatosplenomegaly or masses  Extremities:	FROM x4, no cyanosis or edema, symmetric pulses, warm and well perfused  Skin:		skin intact and not indurated; no rash, no desquamation  Neurologic:	alert, oriented as age-appropriate, affect appropriate; no weakness, no facial asymmetry, moves all extremities, no focal deficits  Musculoskeletal:     no joint swelling, erythema, or tenderness; full range of motion with no contractures; no muscle tenderness; no clubbing; no cyanosis; no edema		    Labs noted: RVP negative    A/P: 6 yo female with intermittent asthma presenting with status asthmaticus and acute respiratory failure s/p biPAP and CPAP. Likely trigger is allergic rhinitis. She had some regression today before transfer where she needed to go back to q3 treatments. However, on exam at this time she is well appearing and without respiratory distress.     - continue albuterol, wean as tolerated based on RSS  - she was seen by [facility] and flovent recommended, therefore she likely has some degree of persistent asthma  - complete 5 days of steroids  - parental education    Anticipated Discharge Date: 5/24  [ ] Social Work needs:  [ ] Case management needs:  [x ] Other discharge needs: PMD and pulm followup    [x ] Reviewed lab results  [ ] Reviewed Radiology  [x ] Spoke with parents/guardian  [ ] Spoke with consultant  [ ] Spoke with laboratory    I was physically present for the key portions of the evaluation and management (E/M) service provided.  I agree with the above history, physical, and plan which I have reviewed and edited where appropriate.     [ x ] 45 minutes spent on total encounter; more than 50% of the visit was spent counseling and/or coordinating care by the attending physician.     Plan discussed with parent/guardian, resident physicians, and nurse.    Bailey Joshi MD  Pediatric Hospitalist

## 2021-11-27 ENCOUNTER — PATIENT MESSAGE (OUTPATIENT)
Dept: FAMILY MEDICINE CLINIC | Age: 71
End: 2021-11-27

## 2021-11-29 NOTE — TELEPHONE ENCOUNTER
From: Andrea Horne  To: Robbin Just  Sent: 11/27/2021 2:37 PM EST  Subject: our blue toilet seat. .. So, my wife and I have been doing  work on this. I recently started using a sauna 4-5 times a week over the last month or 6 weeks. Could this have changed the nature of my sweat? Thanks in advance for any feedback, Inez Ma. Hope you're having a great day.      Kb Muñoz

## 2021-12-01 NOTE — TELEPHONE ENCOUNTER
Patient states would like to try dental appliance possibly wants to check with his dentist having bridge done in the next few week so wants to see if that's something he could do.

## 2021-12-09 NOTE — TELEPHONE ENCOUNTER
Can send referral to Dr. Fredy Lucia for oral appliance if patient would like.   He should have 3-month follow-up if he is going to get oral appliance

## 2021-12-16 NOTE — TELEPHONE ENCOUNTER
Patient called with message left for patient to call back to office.      Office has called patient multiple times    Please advise

## 2021-12-16 NOTE — TELEPHONE ENCOUNTER
Noted patient did not follow-up as requested. Patient did not keep follow up appointment as was recommended. Please schedule a follow up visit for this patient if he calls requesting such appointment.      Please make referring provider aware

## 2021-12-17 DIAGNOSIS — F51.04 CHRONIC INSOMNIA: ICD-10-CM

## 2021-12-17 RX ORDER — ZOLPIDEM TARTRATE 5 MG/1
TABLET ORAL
Qty: 30 TABLET | Refills: 0 | Status: SHIPPED | OUTPATIENT
Start: 2021-12-17 | End: 2022-01-17 | Stop reason: SDUPTHER

## 2021-12-17 NOTE — TELEPHONE ENCOUNTER
Last Office Visit  -  11/1/21  Next Office Visit  -  none    Last Filled  -    Last UDS -    Contract -

## 2022-01-17 DIAGNOSIS — F51.04 CHRONIC INSOMNIA: ICD-10-CM

## 2022-01-17 RX ORDER — ZOLPIDEM TARTRATE 5 MG/1
TABLET ORAL
Qty: 30 TABLET | Refills: 0 | Status: SHIPPED | OUTPATIENT
Start: 2022-01-17 | End: 2022-02-16

## 2022-01-17 NOTE — TELEPHONE ENCOUNTER
Olegario bear refill for patient on  Zolpidem Tartrate 5mg tab  Last visit 11/1/21  No future  kacie preciado

## 2022-02-16 DIAGNOSIS — F51.04 CHRONIC INSOMNIA: ICD-10-CM

## 2022-02-16 RX ORDER — ZOLPIDEM TARTRATE 5 MG/1
TABLET ORAL
Qty: 30 TABLET | Refills: 0 | Status: SHIPPED | OUTPATIENT
Start: 2022-02-16 | End: 2022-03-16

## 2022-02-16 NOTE — TELEPHONE ENCOUNTER
Last Office Visit  -  11/1/21  Next Office Visit  -  n/a    Last Filled  -  1/17/22  Last UDS -  3/8/18  Contract -  8/25/16

## 2022-03-04 RX ORDER — OMEPRAZOLE 20 MG/1
20 CAPSULE, DELAYED RELEASE ORAL DAILY
Qty: 90 CAPSULE | Refills: 1 | Status: ON HOLD | OUTPATIENT
Start: 2022-03-04 | End: 2022-09-19 | Stop reason: SDUPTHER

## 2022-03-16 DIAGNOSIS — F51.04 CHRONIC INSOMNIA: ICD-10-CM

## 2022-03-16 RX ORDER — ZOLPIDEM TARTRATE 5 MG/1
TABLET ORAL
Qty: 30 TABLET | Refills: 0 | Status: SHIPPED | OUTPATIENT
Start: 2022-03-16 | End: 2022-04-14

## 2022-03-16 NOTE — TELEPHONE ENCOUNTER
Last Office Visit  -  11/1/21  Next Office Visit  -      Last Filled  -  2/16/22  Last UDS -  3/8/18  Contract -  9/9/16

## 2022-03-31 RX ORDER — SILDENAFIL 100 MG/1
TABLET, FILM COATED ORAL
Qty: 6 TABLET | Refills: 5 | Status: SHIPPED | OUTPATIENT
Start: 2022-03-31

## 2022-04-07 ENCOUNTER — TELEMEDICINE (OUTPATIENT)
Dept: FAMILY MEDICINE CLINIC | Age: 72
End: 2022-04-07
Payer: MEDICARE

## 2022-04-07 DIAGNOSIS — Z00.00 MEDICARE ANNUAL WELLNESS VISIT, SUBSEQUENT: Primary | ICD-10-CM

## 2022-04-07 PROCEDURE — G0439 PPPS, SUBSEQ VISIT: HCPCS | Performed by: FAMILY MEDICINE

## 2022-04-07 PROCEDURE — 3017F COLORECTAL CA SCREEN DOC REV: CPT | Performed by: FAMILY MEDICINE

## 2022-04-07 PROCEDURE — 4040F PNEUMOC VAC/ADMIN/RCVD: CPT | Performed by: FAMILY MEDICINE

## 2022-04-07 PROCEDURE — 1123F ACP DISCUSS/DSCN MKR DOCD: CPT | Performed by: FAMILY MEDICINE

## 2022-04-07 ASSESSMENT — PATIENT HEALTH QUESTIONNAIRE - PHQ9
2. FEELING DOWN, DEPRESSED OR HOPELESS: 0
SUM OF ALL RESPONSES TO PHQ QUESTIONS 1-9: 0
1. LITTLE INTEREST OR PLEASURE IN DOING THINGS: 0
SUM OF ALL RESPONSES TO PHQ QUESTIONS 1-9: 0
SUM OF ALL RESPONSES TO PHQ QUESTIONS 1-9: 0
SUM OF ALL RESPONSES TO PHQ9 QUESTIONS 1 & 2: 0
SUM OF ALL RESPONSES TO PHQ QUESTIONS 1-9: 0

## 2022-04-07 ASSESSMENT — LIFESTYLE VARIABLES: HOW OFTEN DO YOU HAVE A DRINK CONTAINING ALCOHOL: NEVER

## 2022-04-07 NOTE — PROGRESS NOTES
Medicare Annual Wellness Visit    [address] [place] [address] is here for Medicare AWV    Assessment & Plan   Medicare annual wellness visit, subsequent      Recommendations for Preventive Services Due: see orders and patient instructions/AVS.  Recommended screening schedule for the next 5-10 years is provided to the patient in written form: see Patient Instructions/AVS.     No follow-ups on file. Subjective       Patient's complete Health Risk Assessment and screening values have been reviewed and are found in Flowsheets. The following problems were reviewed today and where indicated follow up appointments were made and/or referrals ordered. Positive Risk Factor Screenings with Interventions:               General Health and ACP:  General  In general, how would you say your health is?: Very Good  In the past 7 days, have you experienced any of the following: New or Increased Pain, New or Increased Fatigue, Loneliness, Social Isolation, Stress or Anger?: No  Do you have a Living Will?: Yes    Advance Directives     Power of  Living Will ACP-Advance Directive ACP-Power of     Not on File Not on File Not on File Not on File      General Health Risk Interventions:  · No Living Will: ACP documents already completed- patient asked to provide copy to the office     Hearing/Vision:  Do you or your family notice any trouble with your hearing that hasn't been managed with hearing aids?: (!) Yes  Do you have difficulty driving, watching TV, or doing any of your daily activities because of your eyesight?: No  Have you had an eye exam within the past year?: (!) No  No exam data present            Objective      Patient-Reported Vitals  Patient-Reported Systolic (Top): 269 mmHg  Patient-Reported Diastolic (Bottom): 60 mmHg  Patient-Reported Pulse: 60  Patient-Reported Weight: 153 lbs              No Known Allergies  Prior to Visit Medications    Medication Sig Taking?  Authorizing Provider   sildenafil (VIAGRA) 100 MG tablet TAKE ONE TABLET BY MOUTH DAILY AS NEEDED FOR ERECTILE DYSFUNCTION  Tootie Arreola MD   zolpidem (AMBIEN) 5 MG tablet TAKE ONE TABLET BY MOUTH EVERY NIGHT AT BEDTIME AS NEEDED FOR SLEEP  Tooite Arreola MD   omeprazole (PRILOSEC) 20 MG delayed release capsule Take 1 capsule by mouth Daily  Omayra Davalos MD   ARIPiprazole (ABILIFY) 2 MG tablet   Historical Provider, MD   cetirizine-psuedoephedrine (ZYRTEC-D) 5-120 MG per extended release tablet Take 1 tablet by mouth as needed   Historical Provider, MD   venlafaxine (EFFEXOR) 75 MG tablet Take 187 mg by mouth daily   Historical Provider, MD   lithium 300 MG capsule Take 300 mg by mouth every evening   Historical Provider, MD Mac (Including outside providers/suppliers regularly involved in providing care):   Patient Care Team:  Tootie Arreola MD as PCP - General (Family Medicine)  Tootie Arreola MD as PCP - [facility] Empaneled Provider  Vivian Knpap MD as Consulting Physician (Gastroenterology)  TRINA Cox CNP as Nurse Practitioner (Family Nurse Practitioner)    Reviewed and updated this visit:  Tobacco  Allergies  Meds  Med Hx  Surg Hx  Soc Hx  Fam Hx           Lulla Oddi Guard, was evaluated through a synchronous (real-time) atelephone encounter. The patient (or guardian if applicable) is aware that this is a billable service. Verbal consent to proceed has been obtained within the past 12 months. The visit was conducted pursuant to the emergency declaration under the [address] [place] [address] authority and the [employer] and [employer] Act. Patient identification was verified, and a caregiver was present when appropriate. The patient was located in a state where the provider was credentialed to provide care. --Nicky Frederick LPN on 7/3/2166 at 1:81 PM    An electronic signature was used to authenticate this note.        Cara Pickett LPN, 4/7/2022, performed the documented evaluation under the direct supervision of the attending physician. This encounter was performed under ELENI awad MDs, direct supervision, 4/7/2022.

## 2022-04-13 ENCOUNTER — OFFICE VISIT (OUTPATIENT)
Dept: FAMILY MEDICINE CLINIC | Age: 72
End: 2022-04-13
Payer: MEDICARE

## 2022-04-13 VITALS
HEIGHT: 71 IN | HEART RATE: 68 BPM | SYSTOLIC BLOOD PRESSURE: 116 MMHG | BODY MASS INDEX: 22.06 KG/M2 | OXYGEN SATURATION: 98 % | DIASTOLIC BLOOD PRESSURE: 64 MMHG | WEIGHT: 157.6 LBS

## 2022-04-13 DIAGNOSIS — E78.2 MIXED HYPERLIPIDEMIA: ICD-10-CM

## 2022-04-13 DIAGNOSIS — Z79.899 MEDICATION MANAGEMENT: Primary | ICD-10-CM

## 2022-04-13 DIAGNOSIS — F51.04 CHRONIC INSOMNIA: ICD-10-CM

## 2022-04-13 DIAGNOSIS — E55.9 VITAMIN D DEFICIENCY: ICD-10-CM

## 2022-04-13 DIAGNOSIS — F31.81 BIPOLAR 2 DISORDER (HCC): ICD-10-CM

## 2022-04-13 DIAGNOSIS — R10.31 RIGHT INGUINAL PAIN: ICD-10-CM

## 2022-04-13 PROCEDURE — G8420 CALC BMI NORM PARAMETERS: HCPCS | Performed by: REGISTERED NURSE

## 2022-04-13 PROCEDURE — G8427 DOCREV CUR MEDS BY ELIG CLIN: HCPCS | Performed by: REGISTERED NURSE

## 2022-04-13 PROCEDURE — 1123F ACP DISCUSS/DSCN MKR DOCD: CPT | Performed by: REGISTERED NURSE

## 2022-04-13 PROCEDURE — 4040F PNEUMOC VAC/ADMIN/RCVD: CPT | Performed by: REGISTERED NURSE

## 2022-04-13 PROCEDURE — 99214 OFFICE O/P EST MOD 30 MIN: CPT | Performed by: REGISTERED NURSE

## 2022-04-13 PROCEDURE — 3017F COLORECTAL CA SCREEN DOC REV: CPT | Performed by: REGISTERED NURSE

## 2022-04-13 PROCEDURE — 36415 COLL VENOUS BLD VENIPUNCTURE: CPT | Performed by: REGISTERED NURSE

## 2022-04-13 PROCEDURE — 1036F TOBACCO NON-USER: CPT | Performed by: REGISTERED NURSE

## 2022-04-13 RX ORDER — VENLAFAXINE HYDROCHLORIDE 75 MG/1
75 CAPSULE, EXTENDED RELEASE ORAL DAILY
COMMUNITY
Start: 2022-03-15

## 2022-04-13 RX ORDER — VENLAFAXINE HYDROCHLORIDE 37.5 MG/1
37.5 CAPSULE, EXTENDED RELEASE ORAL DAILY
COMMUNITY

## 2022-04-13 ASSESSMENT — ENCOUNTER SYMPTOMS
COUGH: 0
SHORTNESS OF BREATH: 0
BACK PAIN: 0
GASTROINTESTINAL NEGATIVE: 1

## 2022-04-13 NOTE — PROGRESS NOTES
Patient: Michael Meza is a 73 y.o. male who presents today with the following Chief Complaint(s):  Chief Complaint   Patient presents with    Medication Refill     Refill       Assessment/Plan:    1. Medication management  Kidney and liver function monitoring due to multiple medications. - Comprehensive Metabolic Panel    2. Mixed hyperlipidemia    - Lipid Panel    3. Vitamin D deficiency  He is currently taking vitamin D 2000 IUs daily. He would like to have this level rechecked to see if his vitamin D level has improved  - Vitamin D 25 Hydroxy    4. Bipolar 2 disorder ([facility] [address]  He reports his bipolar disorder is well managed. He was started on Abilify and says that this had a positive impact on his symptoms. He is doing well. Declines any needs or issues. 5. Right inguinal pain  Some soreness in right groin. He says that this has happened before after he has started running again. This usually resolves on its ownthis time symptoms have been going on for approximately 1 week. Clinical exam is normal-area of concern is consistent with his musculaturerecommend ice and gentle stretching and to refrain from running until symptoms improve. If no improvement return to office for reevaluation. 6. Chronic insomnia  Stable. Continue current medication. Recommend refraining from use of Benadryl if he is taking Ambien. Return in about 1 year (around 4/13/2023), or if symptoms worsen or fail to improve. HPI:     He is here for his insomnia follow up. He has been taking Ambien 5mg nightly because he was trying to wean off this medication. Insomnia:  Current treatment: prescription sleep aid- Ambien- 5mg, which has been mostly effective. Medication side effects: 3-4 nights month he has trouble sleeping, he occasionally takes diphenhydramine. He says he feels the best he has felt in a long time. His bipolar disorder is well controlled. He teaches yoga and pilates.        Current Outpatient Medications   Medication Sig Dispense Refill    venlafaxine (EFFEXOR XR) 75 MG extended release capsule       venlafaxine (EFFEXOR XR) 37.5 MG extended release capsule Take 37.5 mg by mouth daily      sildenafil (VIAGRA) 100 MG tablet TAKE ONE TABLET BY MOUTH DAILY AS NEEDED FOR ERECTILE DYSFUNCTION 6 tablet 5    zolpidem (AMBIEN) 5 MG tablet TAKE ONE TABLET BY MOUTH EVERY NIGHT AT BEDTIME AS NEEDED FOR SLEEP 30 tablet 0    omeprazole (PRILOSEC) 20 MG delayed release capsule Take 1 capsule by mouth Daily 90 capsule 1    ARIPiprazole (ABILIFY) 2 MG tablet       cetirizine-psuedoephedrine (ZYRTEC-D) 5-120 MG per extended release tablet Take 1 tablet by mouth as needed       lithium 300 MG capsule Take 300 mg by mouth every evening        No current facility-administered medications for this visit. Review of Systems   Constitutional: Negative. Negative for chills, diaphoresis, fatigue and fever. HENT: Negative. Eyes: Negative for visual disturbance. Respiratory: Negative for cough and shortness of breath. Cardiovascular: Negative for chest pain and palpitations. Gastrointestinal: Negative. Endocrine: Negative. Genitourinary: Negative for difficulty urinating. Musculoskeletal: Positive for myalgias ( right groin pain ). Negative for arthralgias, back pain and neck pain. Skin: Negative. Neurological: Negative for dizziness, light-headedness and headaches. Psychiatric/Behavioral: Negative for dysphoric mood, self-injury, sleep disturbance and suicidal ideas. The patient is not nervous/anxious. Vitals:    04/13/22 1005   BP: 116/64   Pulse: 68   SpO2: 98%   Weight: 157 lb 9.6 oz (71.5 kg)   Height: 5' 11\" (1.803 m)     Physical Exam  Vitals reviewed. Constitutional:       General: He is not in acute distress. Appearance: Normal appearance. He is normal weight. He is not ill-appearing, toxic-appearing or diaphoretic. HENT:      Head: Normocephalic and atraumatic. Right Ear: Tympanic membrane, ear canal and external ear normal. There is no impacted cerumen. Left Ear: Tympanic membrane, ear canal and external ear normal. There is no impacted cerumen. Nose: Nose normal. No congestion or rhinorrhea. Mouth/Throat:      Mouth: Mucous membranes are moist.      Pharynx: Oropharynx is clear. No oropharyngeal exudate or posterior oropharyngeal erythema. Eyes:      General:         Right eye: No discharge. Left eye: No discharge. Extraocular Movements: Extraocular movements intact. Conjunctiva/sclera: Conjunctivae normal.      Pupils: Pupils are equal, round, and reactive to light. Cardiovascular:      Rate and Rhythm: Normal rate and regular rhythm. Pulses: Normal pulses. Heart sounds: Normal heart sounds. No murmur heard. No friction rub. No gallop. Pulmonary:      Effort: Pulmonary effort is normal.      Breath sounds: Normal breath sounds. No stridor. No wheezing, rhonchi or rales. Abdominal:      General: Abdomen is flat. Bowel sounds are normal. There is no distension. Palpations: Abdomen is soft. There is no mass. Tenderness: There is no abdominal tenderness. There is no guarding or rebound. Hernia: No hernia is present. Musculoskeletal:         General: Normal range of motion. Cervical back: Normal range of motion and neck supple. No tenderness. Right lower leg: No edema. Left lower leg: No edema. Lymphadenopathy:      Cervical: No cervical adenopathy. Skin:     General: Skin is warm and dry. Capillary Refill: Capillary refill takes less than 2 seconds. Coloration: Skin is not jaundiced or pale. Findings: No erythema. Neurological:      General: No focal deficit present. Mental Status: He is alert and oriented to person, place, and time. Psychiatric:         Mood and Affect: Mood normal.         Behavior: Behavior normal.         Thought Content:  Thought content normal.         Judgment: Judgment normal.            Patient's past medical history, surgical history, family history, medications,  and allergies  were all reviewed and updated as appropriate today.     Patient Active Problem List   Diagnosis    Vitamin D deficiency    Hyperlipidemia    Bone spur    Bipolar 2 disorder ([address]    Insomnia due to medical condition    Normocytic anemia    Erosive esophagitis    Hiatal hernia    Sanchez's esophagus determined by endoscopy    Diverticulosis of colon    Gastroesophageal reflux disease with esophagitis without hemorrhage    Internal hemorrhoid    Snoring    Mild obstructive sleep apnea    Other fatigue    Psychophysiological insomnia    RLS (restless legs syndrome)     Past Medical History:   Diagnosis Date    Acid reflux     Sanchez esophagus 2019    Dr Marquita Mercado, f/u EGD q 3 yr    Bipolar 2 disorder, major depressive episode ([facility] [address] 2012    hypomania    BPH (benign prostatic hyperplasia)     Depression 1980    Diverticulitis     Erosive esophagitis 2019    Hyperlipidemia 2010    pt denies    Vitamin D deficiency       Past Surgical History:   Procedure Laterality Date    APPENDECTOMY  age 27    COLONOSCOPY  2007    COLONOSCOPY N/A 12/3/2019    COLONOSCOPY & EGD performed by Supa Singer MD at [place] [address] N/A 10/8/2021    COLONOSCOPY performed by Marga Villarreal MD at [address] [place] [address]  age 4 and 5    x2    LASIK  2006    PROSTATE BIOPSY      UPPER GASTROINTESTINAL ENDOSCOPY N/A 9/24/2019    EGD performed by Supa Singer MD at [address] ENDOSCOPY N/A 12/3/2019    COLONOSCOPY & EGD performed by Supa Singer MD at [address] [facility]       Family History   Problem Relation Age of Onset    Mental Illness Mother         depression    High Blood Pressure Mother     Stroke Mother [de-identified]    Mental Illness Father         depression    Cancer Father 76        colorectal    Mental Illness Sister         depression    Arthritis Sister     Mental Illness Brother         depression    Arthritis Brother     Thyroid Disease Brother     Cancer Paternal Aunt         ovarian    Cancer Paternal Grandfather         lymphoma    Cancer Paternal Aunt     Diabetes Neg Hx       No Known Allergies    This chart was generated using the April[employer] dictation system. I created this record but it may contain dictation errors due to the limitation of the software.

## 2022-04-14 DIAGNOSIS — F51.04 CHRONIC INSOMNIA: ICD-10-CM

## 2022-04-14 LAB
A/G RATIO: 1.9 (ref 1.1–2.2)
ALBUMIN SERPL-MCNC: 5.1 G/DL (ref 3.4–5)
ALP BLD-CCNC: 72 U/L (ref 40–129)
ALT SERPL-CCNC: 29 U/L (ref 10–40)
ANION GAP SERPL CALCULATED.3IONS-SCNC: 23 MMOL/L (ref 3–16)
AST SERPL-CCNC: 48 U/L (ref 15–37)
BILIRUB SERPL-MCNC: <0.2 MG/DL (ref 0–1)
BUN BLDV-MCNC: 20 MG/DL (ref 7–20)
CALCIUM SERPL-MCNC: 9.7 MG/DL (ref 8.3–10.6)
CHLORIDE BLD-SCNC: 100 MMOL/L (ref 99–110)
CHOLESTEROL, TOTAL: 238 MG/DL (ref 0–199)
CO2: 19 MMOL/L (ref 21–32)
CREAT SERPL-MCNC: 1.1 MG/DL (ref 0.8–1.3)
GFR AFRICAN AMERICAN: >60
GFR NON-AFRICAN AMERICAN: >60
GLUCOSE BLD-MCNC: 77 MG/DL (ref 70–99)
HDLC SERPL-MCNC: 80 MG/DL (ref 40–60)
LDL CHOLESTEROL CALCULATED: 143 MG/DL
POTASSIUM SERPL-SCNC: 4.6 MMOL/L (ref 3.5–5.1)
SODIUM BLD-SCNC: 142 MMOL/L (ref 136–145)
TOTAL PROTEIN: 7.8 G/DL (ref 6.4–8.2)
TRIGL SERPL-MCNC: 75 MG/DL (ref 0–150)
VITAMIN D 25-HYDROXY: 44.6 NG/ML
VLDLC SERPL CALC-MCNC: 15 MG/DL

## 2022-04-14 RX ORDER — ZOLPIDEM TARTRATE 5 MG/1
TABLET ORAL
Qty: 30 TABLET | Refills: 0 | Status: SHIPPED | OUTPATIENT
Start: 2022-04-14 | End: 2022-05-12

## 2022-04-14 NOTE — TELEPHONE ENCOUNTER
Last Office Visit  -  4/13/22  Next Office Visit  -  n/a    Last Filled  -  3/16/22  Last UDS -  n/a  Contract -  N/a

## 2022-05-12 DIAGNOSIS — F51.04 CHRONIC INSOMNIA: ICD-10-CM

## 2022-05-12 RX ORDER — ZOLPIDEM TARTRATE 5 MG/1
TABLET ORAL
Qty: 30 TABLET | Refills: 0 | Status: SHIPPED | OUTPATIENT
Start: 2022-05-13 | End: 2022-06-14 | Stop reason: SDUPTHER

## 2022-06-14 DIAGNOSIS — F51.04 CHRONIC INSOMNIA: ICD-10-CM

## 2022-06-14 RX ORDER — ZOLPIDEM TARTRATE 5 MG/1
TABLET ORAL
Qty: 30 TABLET | Refills: 0 | Status: SHIPPED | OUTPATIENT
Start: 2022-06-14 | End: 2022-07-15

## 2022-06-14 NOTE — TELEPHONE ENCOUNTER
Last Office Visit  -  4-  Next Office Visit  -  4-    Last Filled  -   Last UDS -    Contract -      Refill ambien 5 mg     Pharmacy    [facility] [id number] - [id number] [facility] - [address] [phone]

## 2022-06-14 NOTE — TELEPHONE ENCOUNTER
Last Office Visit  -  4/13/22  Next Office Visit  -  4/13/23    Last Filled  -  5/13/22  Last UDS -  3/8/18  Contract -  9/9/16

## 2022-07-14 DIAGNOSIS — F51.04 CHRONIC INSOMNIA: ICD-10-CM

## 2022-07-15 RX ORDER — ZOLPIDEM TARTRATE 5 MG/1
TABLET ORAL
Qty: 30 TABLET | Refills: 0 | Status: SHIPPED | OUTPATIENT
Start: 2022-07-15 | End: 2022-08-16 | Stop reason: SDUPTHER

## 2022-07-15 NOTE — TELEPHONE ENCOUNTER
Patient contacted the office in regards to the status of this refill. Please advise patient at [phone] when sent to pharmacy.

## 2022-08-01 ENCOUNTER — PATIENT MESSAGE (OUTPATIENT)
Dept: FAMILY MEDICINE CLINIC | Age: 72
End: 2022-08-01

## 2022-08-01 DIAGNOSIS — K22.10 EROSIVE ESOPHAGITIS: Primary | ICD-10-CM

## 2022-08-01 NOTE — TELEPHONE ENCOUNTER
From: Bonnie Hancock Guard  To: Dr. Akil March: 8/1/2022 9:19 AM EDT  Subject: esophagus scope     HI Dr. Tatiana Rogel. Quick question: It is coming up on 2 years since I had my second upper GI scope and Dr. Patrica Gilbert said I should get another one when the 2 years are up. I can't remember exactly when that was but I think it is this Fall. I know Dr. Patrica Gilbert is no longer with [facility] so I wanted to get your direction on what I should do. I hope your day is going well.      Saúl Berger

## 2022-08-15 ENCOUNTER — TELEPHONE (OUTPATIENT)
Dept: FAMILY MEDICINE CLINIC | Age: 72
End: 2022-08-15

## 2022-08-16 ENCOUNTER — OFFICE VISIT (OUTPATIENT)
Dept: FAMILY MEDICINE CLINIC | Age: 72
End: 2022-08-16
Payer: MEDICARE

## 2022-08-16 VITALS
OXYGEN SATURATION: 96 % | WEIGHT: 160 LBS | DIASTOLIC BLOOD PRESSURE: 68 MMHG | HEART RATE: 57 BPM | SYSTOLIC BLOOD PRESSURE: 106 MMHG | BODY MASS INDEX: 22.32 KG/M2

## 2022-08-16 DIAGNOSIS — F51.04 CHRONIC INSOMNIA: Primary | ICD-10-CM

## 2022-08-16 PROCEDURE — 1123F ACP DISCUSS/DSCN MKR DOCD: CPT | Performed by: NURSE PRACTITIONER

## 2022-08-16 PROCEDURE — G8420 CALC BMI NORM PARAMETERS: HCPCS | Performed by: NURSE PRACTITIONER

## 2022-08-16 PROCEDURE — 1036F TOBACCO NON-USER: CPT | Performed by: NURSE PRACTITIONER

## 2022-08-16 PROCEDURE — G8427 DOCREV CUR MEDS BY ELIG CLIN: HCPCS | Performed by: NURSE PRACTITIONER

## 2022-08-16 PROCEDURE — 99214 OFFICE O/P EST MOD 30 MIN: CPT | Performed by: NURSE PRACTITIONER

## 2022-08-16 PROCEDURE — 3017F COLORECTAL CA SCREEN DOC REV: CPT | Performed by: NURSE PRACTITIONER

## 2022-08-16 RX ORDER — ZOLPIDEM TARTRATE 5 MG/1
5 TABLET ORAL NIGHTLY PRN
COMMUNITY
End: 2022-08-16 | Stop reason: DRUGHIGH

## 2022-08-16 RX ORDER — ZOLPIDEM TARTRATE 10 MG/1
TABLET ORAL
Qty: 30 TABLET | Refills: 0 | Status: SHIPPED | OUTPATIENT
Start: 2022-08-16 | End: 2022-09-14

## 2022-08-16 ASSESSMENT — ENCOUNTER SYMPTOMS
WHEEZING: 0
SHORTNESS OF BREATH: 0

## 2022-08-16 NOTE — PROGRESS NOTES
Patient: Lucho Ford is a 81 y.o. male who presents today with the following Chief Complaint(s):  Chief Complaint   Patient presents with    Medication Check     Ambien          HPI  Insomnia: Here for med check- taking ambien 5mg now and does not feel like it helps. Sometimes having to take benedryl as well and still doesn't sleep well. Will increase back to 10mg tablet    Current Outpatient Medications   Medication Sig Dispense Refill    zolpidem (AMBIEN) 10 MG tablet TAKE ONE TABLET BY MOUTH EVERY NIGHT AT BEDTIME AS NEEDED FOR SLEEP 30 tablet 0    venlafaxine (EFFEXOR XR) 75 MG extended release capsule       venlafaxine (EFFEXOR XR) 37.5 MG extended release capsule Take 37.5 mg by mouth daily      sildenafil (VIAGRA) 100 MG tablet TAKE ONE TABLET BY MOUTH DAILY AS NEEDED FOR ERECTILE DYSFUNCTION 6 tablet 5    omeprazole (PRILOSEC) 20 MG delayed release capsule Take 1 capsule by mouth Daily 90 capsule 1    ARIPiprazole (ABILIFY) 2 MG tablet       lithium 300 MG capsule Take 300 mg by mouth every evening       cetirizine-psuedoephedrine (ZYRTEC-D) 5-120 MG per extended release tablet Take 1 tablet by mouth as needed  (Patient not taking: Reported on 8/16/2022)       No current facility-administered medications for this visit. Patient's past medical history, surgical history, family history, medications,  andallergies  were all reviewed and updated as appropriate today. Review of Systems   Respiratory:  Negative for shortness of breath and wheezing. Cardiovascular:  Negative for chest pain and palpitations. Psychiatric/Behavioral:  Positive for sleep disturbance. Mood stable on meds       Physical Exam  Vitals and nursing note reviewed. Constitutional:       Appearance: Normal appearance. He is well-developed. HENT:      Head: Normocephalic and atraumatic.       Right Ear: External ear normal.      Left Ear: External ear normal.      Nose: Nose normal.      Mouth/Throat:      Pharynx: No oropharyngeal exudate or posterior oropharyngeal erythema. Eyes:      Conjunctiva/sclera: Conjunctivae normal.   Cardiovascular:      Rate and Rhythm: Normal rate and regular rhythm. Heart sounds: Normal heart sounds. No murmur heard. Pulmonary:      Effort: Pulmonary effort is normal. No respiratory distress. Breath sounds: Normal breath sounds. No wheezing or rales. Musculoskeletal:         General: Normal range of motion. Cervical back: Normal range of motion. Lymphadenopathy:      Cervical: No cervical adenopathy. Skin:     General: Skin is warm and dry. Neurological:      General: No focal deficit present. Mental Status: He is alert and oriented to person, place, and time. Deep Tendon Reflexes: Reflexes are normal and symmetric. Psychiatric:         Mood and Affect: Mood normal.         Behavior: Behavior normal.         Thought Content: Thought content normal.         Judgment: Judgment normal.     Vitals:    08/16/22 0805   BP: 106/68   Pulse: 57   SpO2: 96%       Assessment:  Encounter Diagnosis   Name Primary? Chronic insomnia Yes       Plan:  1. Chronic insomnia  Increased dose  - zolpidem (AMBIEN) 10 MG tablet; TAKE ONE TABLET BY MOUTH EVERY NIGHT AT BEDTIME AS NEEDED FOR SLEEP  Dispense: 30 tablet; Refill: 0        No follow-ups on file.

## 2022-09-14 DIAGNOSIS — F51.04 CHRONIC INSOMNIA: ICD-10-CM

## 2022-09-14 RX ORDER — ZOLPIDEM TARTRATE 10 MG/1
TABLET ORAL
Qty: 30 TABLET | Refills: 0 | Status: SHIPPED | OUTPATIENT
Start: 2022-09-14 | End: 2022-10-14 | Stop reason: SDUPTHER

## 2022-09-14 NOTE — TELEPHONE ENCOUNTER
Last Office Visit  -  8/16/22  Next Office Visit  -  none    Last Filled  -  8/16/22  Last UDS -  3/8/2018  Contract -  8/25/2016

## 2022-09-15 NOTE — PROGRESS NOTES

## 2022-09-16 ENCOUNTER — ANESTHESIA EVENT (OUTPATIENT)
Dept: ENDOSCOPY | Age: 72
End: 2022-09-16
Payer: MEDICARE

## 2022-09-19 ENCOUNTER — HOSPITAL ENCOUNTER (OUTPATIENT)
Age: 72
Setting detail: OUTPATIENT SURGERY
Discharge: HOME | End: 2022-09-19
Attending: INTERNAL MEDICINE | Admitting: INTERNAL MEDICINE
Payer: MEDICARE

## 2022-09-19 ENCOUNTER — ANESTHESIA (OUTPATIENT)
Dept: ENDOSCOPY | Age: 72
End: 2022-09-19
Payer: MEDICARE

## 2022-09-19 VITALS
HEIGHT: 71 IN | SYSTOLIC BLOOD PRESSURE: 136 MMHG | DIASTOLIC BLOOD PRESSURE: 79 MMHG | OXYGEN SATURATION: 97 % | RESPIRATION RATE: 18 BRPM | WEIGHT: 156 LBS | TEMPERATURE: 96.4 F | HEART RATE: 58 BPM | BODY MASS INDEX: 21.84 KG/M2

## 2022-09-19 DIAGNOSIS — K22.70 BARRETT'S ESOPHAGUS WITHOUT DYSPLASIA: ICD-10-CM

## 2022-09-19 PROCEDURE — 2709999900 HC NON-CHARGEABLE SUPPLY: Performed by: INTERNAL MEDICINE

## 2022-09-19 PROCEDURE — 2500000003 HC RX 250 WO HCPCS: Performed by: NURSE ANESTHETIST, CERTIFIED REGISTERED

## 2022-09-19 PROCEDURE — 88305 TISSUE EXAM BY PATHOLOGIST: CPT

## 2022-09-19 PROCEDURE — 7100000010 HC PHASE II RECOVERY - FIRST 15 MIN: Performed by: INTERNAL MEDICINE

## 2022-09-19 PROCEDURE — 2580000003 HC RX 258: Performed by: INTERNAL MEDICINE

## 2022-09-19 PROCEDURE — 3700000000 HC ANESTHESIA ATTENDED CARE: Performed by: INTERNAL MEDICINE

## 2022-09-19 PROCEDURE — 3609012400 HC EGD TRANSORAL BIOPSY SINGLE/MULTIPLE: Performed by: INTERNAL MEDICINE

## 2022-09-19 PROCEDURE — 7100000011 HC PHASE II RECOVERY - ADDTL 15 MIN: Performed by: INTERNAL MEDICINE

## 2022-09-19 PROCEDURE — 6360000002 HC RX W HCPCS: Performed by: NURSE ANESTHETIST, CERTIFIED REGISTERED

## 2022-09-19 RX ORDER — SODIUM CHLORIDE, SODIUM LACTATE, POTASSIUM CHLORIDE, CALCIUM CHLORIDE 600; 310; 30; 20 MG/100ML; MG/100ML; MG/100ML; MG/100ML
INJECTION, SOLUTION INTRAVENOUS CONTINUOUS
Status: DISCONTINUED | OUTPATIENT
Start: 2022-09-19 | End: 2022-09-19 | Stop reason: HOSPADM

## 2022-09-19 RX ORDER — SODIUM CHLORIDE 0.9 % (FLUSH) 0.9 %
5-40 SYRINGE (ML) INJECTION EVERY 12 HOURS SCHEDULED
Status: DISCONTINUED | OUTPATIENT
Start: 2022-09-19 | End: 2022-09-19 | Stop reason: HOSPADM

## 2022-09-19 RX ORDER — LIDOCAINE HYDROCHLORIDE 10 MG/ML
0.1 INJECTION, SOLUTION EPIDURAL; INFILTRATION; INTRACAUDAL; PERINEURAL
Status: DISCONTINUED | OUTPATIENT
Start: 2022-09-19 | End: 2022-09-19 | Stop reason: HOSPADM

## 2022-09-19 RX ORDER — SODIUM CHLORIDE 0.9 % (FLUSH) 0.9 %
5-40 SYRINGE (ML) INJECTION PRN
Status: DISCONTINUED | OUTPATIENT
Start: 2022-09-19 | End: 2022-09-19 | Stop reason: HOSPADM

## 2022-09-19 RX ORDER — SODIUM CHLORIDE 9 MG/ML
INJECTION, SOLUTION INTRAVENOUS PRN
Status: DISCONTINUED | OUTPATIENT
Start: 2022-09-19 | End: 2022-09-19 | Stop reason: HOSPADM

## 2022-09-19 RX ORDER — PROPOFOL 10 MG/ML
INJECTION, EMULSION INTRAVENOUS PRN
Status: DISCONTINUED | OUTPATIENT
Start: 2022-09-19 | End: 2022-09-19 | Stop reason: SDUPTHER

## 2022-09-19 RX ORDER — SODIUM CHLORIDE, SODIUM LACTATE, POTASSIUM CHLORIDE, CALCIUM CHLORIDE 600; 310; 30; 20 MG/100ML; MG/100ML; MG/100ML; MG/100ML
INJECTION, SOLUTION INTRAVENOUS ONCE
Status: COMPLETED | OUTPATIENT
Start: 2022-09-19 | End: 2022-09-19

## 2022-09-19 RX ORDER — OMEPRAZOLE 20 MG/1
20 CAPSULE, DELAYED RELEASE ORAL
Qty: 90 CAPSULE | Refills: 3 | Status: SHIPPED | OUTPATIENT
Start: 2022-09-19 | End: 2022-09-23

## 2022-09-19 RX ADMIN — PROPOFOL 20 MG: 10 INJECTION, EMULSION INTRAVENOUS at 11:55

## 2022-09-19 RX ADMIN — SODIUM CHLORIDE, POTASSIUM CHLORIDE, SODIUM LACTATE AND CALCIUM CHLORIDE: 600; 310; 30; 20 INJECTION, SOLUTION INTRAVENOUS at 11:06

## 2022-09-19 RX ADMIN — PROPOFOL 60 MG: 10 INJECTION, EMULSION INTRAVENOUS at 11:51

## 2022-09-19 RX ADMIN — PROPOFOL 20 MG: 10 INJECTION, EMULSION INTRAVENOUS at 11:53

## 2022-09-19 RX ADMIN — LIDOCAINE HYDROCHLORIDE 50 MG: 10 INJECTION, SOLUTION INFILTRATION; PERINEURAL at 11:51

## 2022-09-19 ASSESSMENT — PAIN - FUNCTIONAL ASSESSMENT: PAIN_FUNCTIONAL_ASSESSMENT: 0-10

## 2022-09-19 NOTE — H&P
73 y.o. male with medical history of nondysplastic Sanchez's esophagus (C0M2), bipolar disorder, depression, diverticulosis, hyperlipidemia who returns for follow-up. Offers no complaints. Reports good compliance with omeprazole 20 mg daily. He has stopped late night meals at least 2-3 hours prior to bedtime. Denies abdominal pain, heartburn, dysphagia, odynophagia, nausea, vomiting, fever, chills,melena or hematochezia. Last EGD 12/3/2019 with Dr. Bret Stevenson- Erosive esophagitis seen at prior exam has healed. Possible Barretts over a 2 cm area from 39 to 41 cms with intervening islands of squamous mucosa, biopsied (path -  gastroesophageal junction mucosa with focal intestinal metaplasia, consistent with Sanchez's mucosa. Negative for dysplasia or malignancy). A 3 cm sliding hiatal hernia. Stomach and duodenum normal to the second portion. Repeat EGD for surveillance in 3 years (2022). Last colonoscopy 10/8/21: Weak anal sphincter tone. Medium sized non bleeding diverticula in the sigmoid and descending colon. Normal appearing terminal ileum. Medium sized non bleeding internal hemorrhoids. Repeat in 10 years (2031)    Colonoscopy 12/3/2019 with Dr. Bret Stevenson - Severe diverticulosis in the sigmoid and descending colon. Redundant colon with fair prep in the left colon and poor prep in several areas in the right colon. No large lesions seen but prep is a limitation. Repeat colonoscopy 2 years with 2 day prep    Problem List/Past Medical Eric Brownlee MD; 8/17/2022 2:42 PM)  No Known Problems   [08/17/2022]: (Marked as Inactive)  HX, FAMILY, MALIGNANCY, GI TRACT (V16.0)    Problems Reconciled      Medication History Radha Young; 8/17/2022 2:21 PM)  Lithium Carbonate  (300MG Capsule, Oral) Active. No Current Medications  (Taken starting 08/17/2022)  Zolpidem Tartrate  (5MG Tablet, Oral) Active. ARIPiprazole  (5MG Tablet, Oral) Active.   Medications Reconciled     Allergies (Radha Young; 8/17/2022 2:21 PM)  No Known Drug Allergies   [07/05/2012]: Allergies Reconciled      Social History (Hakeem Dowling; 8/17/2022 2:22 PM)  Alcohol Assessment    Alcohol use   Never. Illicit drug use    NO TABACCO USE      Past Surgical History Hakeem Dowling; 8/17/2022 2:22 PM)  Appendectomy      Family History Hakeem Dowling; 8/17/2022 2:23 PM)  Ovarian Cancer   Family Members In General.        Review of Systems (Hakeem Dowling; 8/17/2022 2:24 PM)  Gastrointestinal Not Present- Abdominal Pain, Black, Tarry Stool, Bloating/Excess Gas, Bloody Stool, Constipation, Decreased Appetite, Diarrhea, Difficulty Swallowing, Heartburn, Incontinence of Stool, Jaundice, Nausea/Vomiting, Straining at stool and Weight Loss. Vitals Hakeem Dowling; 8/17/2022 2:19 PM)  8/17/2022 2:19 PM  Weight: 146.38 lb   Height: 71 in   Height was reported by patient. Body Surface Area: 1.85 m²   Body Mass Index: 20.41 kg/m²                Physical Exam Omid Tracy MD; 8/17/2022 2:41 PM)  General  Mental Status - Alert. General Appearance - Cooperative, Well groomed, Consistent with stated age, Not in acute distress. Orientation - Oriented to time, Oriented to place, Oriented to purpose and Oriented to person. Build & Nutrition - Well nourished and Well developed. Hydration - Well hydrated. Chest and Lung Exam  Chest and lung exam reveals  - normal excursion with symmetric chest walls, quiet, even and easy respiratory effort with no use of accessory muscles and on auscultation, normal breath sounds, no adventitious sounds and normal vocal resonance. Abdomen  Inspection  Inspection of the abdomen reveals - No Visible peristalsis, No Abnormal pulsations, No Paradoxical movements and No Hernias. Skin - Inspection of the skin of the abdomen reveals - No Stria and No Scars.   Palpation/Percussion  Palpation and Percussion of the abdomen reveal - Soft, Non Tender, No Rebound tenderness, No Rigidity (guarding) and No hepatosplenomegaly. Auscultation  Auscultation of the abdomen reveals - Bowel sounds normal.        Assessment & Plan Bradford Mccarty MD; 8/17/2022 2:46 PM)    Sanchez's esophagus without dysplasia (K22.70)  Impression: Short segment nondysplastic Sanchez's esophagus - clinically improved    Plan for EGD with esophageal biopsies for Sanchez's surveillance  Continue omeprazole 20 mg daily. Maintain anti-reflux measures with avoidance of carbonated/acid beverages, fried and fatty foods, peppermint, chocolate, alcohol, coffee, citrus fruits/juices, tomato products. Avoid lying down for 2 to 3 hours after eating and avoid tight fitting clothing. Current Plans  EGD, with biopsy (95243)  Started Omeprazole 20 MG Oral Tablet Delayed Release, 1 (one) Tablet daily, #90, 90 days starting 08/17/2022, Ref. x3.  Local Order:  Patient Instructions:  Take 30 minutes before meal

## 2022-09-19 NOTE — ANESTHESIA PRE PROCEDURE
Department of Anesthesiology  Preprocedure Note       Name:  Scout Woody   Age:  81 y.o.  :  1950                                          MRN:  [id number]         Date:  2022      Surgeon: Kermit Vidal):  Emilia Lopez MD    Procedure: Procedure(s):  EGD    Medications prior to admission:   Prior to Admission medications    Medication Sig Start Date End Date Taking?  Authorizing Provider   zolpidem (AMBIEN) 10 MG tablet TAKE ONE TABLET BY MOUTH EVERY NIGHT AT BEDTIME AS NEEDED FOR SLEEP 9/14/22 10/13/22  Jv Marsh MD   venlafaxine (EFFEXOR XR) 75 MG extended release capsule Take 75 mg by mouth daily Takes with 37.5 mg for total of 112.5 mg daily 3/15/22   Historical Provider, MD   venlafaxine (EFFEXOR XR) 37.5 MG extended release capsule Take 37.5 mg by mouth daily Takes with 75 mg for total of 112.5 mg daily    Historical Provider, MD   sildenafil (VIAGRA) 100 MG tablet TAKE ONE TABLET BY MOUTH DAILY AS NEEDED FOR ERECTILE DYSFUNCTION 3/31/22   Jv Marsh MD   omeprazole (PRILOSEC) 20 MG delayed release capsule Take 1 capsule by mouth Daily  Patient taking differently: Take 20 mg by mouth Daily with supper 3/4/22   Emilia Lopez MD   ARIPiprazole (ABILIFY) 2 MG tablet Take 2 mg by mouth daily 10/29/21   Historical Provider, MD   cetirizine-psuedoephedrine (ZYRTEC-D) 5-120 MG per extended release tablet Take 1 tablet by mouth as needed    Historical Provider, MD   lithium 300 MG capsule Take 300 mg by mouth every evening     Historical Provider, MD       Current medications:    Current Facility-Administered Medications   Medication Dose Route Frequency Provider Last Rate Last Admin    lactated ringers infusion   IntraVENous Once Emilia Lopez MD        lidocaine PF 1 % injection 0.1 mL  0.1 mL IntraDERmal Once PRN Emilia Lopez MD        lactated ringers infusion   IntraVENous Continuous Eleni King MD        sodium chloride flush 0.9 % injection 5-40 mL  5-40 mL IntraVENous 2 times per day Breana Carballo MD        sodium chloride flush 0.9 % injection 5-40 mL  5-40 mL IntraVENous PRN Breana Carballo MD        0.9 % sodium chloride infusion   IntraVENous PRN Breana Carballo MD           Allergies:  No Known Allergies    Problem List:    Patient Active Problem List   Diagnosis Code    Vitamin D deficiency E55.9    Hyperlipidemia E78.5    Bone spur M77.9    Bipolar 2 disorder ([facility] [address] F31.81    Insomnia due to medical condition G47.01    Normocytic anemia D64.9    Erosive esophagitis K22.10    Hiatal hernia K44.9    Sanchez's esophagus determined by endoscopy K22.70    Diverticulosis of colon K57.30    Gastroesophageal reflux disease with esophagitis without hemorrhage K21.00    Internal hemorrhoid K64.8    Snoring R06.83    Mild obstructive sleep apnea G47.33    Other fatigue R53.83    Psychophysiological insomnia F51.04    RLS (restless legs syndrome) G25.81       Past Medical History:        Diagnosis Date    Acid reflux     Sanchez esophagus 2019    Dr Connor Marquez, f/u EGD q 3 yr    Bipolar 2 disorder, major depressive episode ([facility] [address] 2012    hypomania    BPH (benign prostatic hyperplasia)     Depression 1980    Diverticulitis     Erosive esophagitis 2019    Vitamin D deficiency        Past Surgical History:        Procedure Laterality Date    APPENDECTOMY  age 27    COLONOSCOPY  2007    COLONOSCOPY N/A 12/3/2019    COLONOSCOPY & EGD performed by Dixon Muhammad MD at [address] [facility] N/A 10/8/2021    COLONOSCOPY performed by Umm Mcgee MD at [address] [facility]  age 4 and 6    x2    LASIK  2006    PROSTATE BIOPSY      UPPER GASTROINTESTINAL ENDOSCOPY N/A 9/24/2019    EGD performed by Dixon Muhammad MD at [facility] N/A 12/3/2019    COLONOSCOPY & EGD performed by Dixon Muhammad MD at [address] [place] [address] History: Social History     Tobacco Use    Smoking status: Former     Packs/day: 1.00     Years: 12.00     Pack years: 12.00     Types: Cigarettes     Quit date: 2008     Years since quittin.7    Smokeless tobacco: Never   Substance Use Topics    Alcohol use: Not Currently     Alcohol/week: 20.0 standard drinks     Types: 20 Cans of beer per week     Comment: quit 2013                                Counseling given: Not Answered      Vital Signs (Current):   Vitals:    09/15/22 1009 22 1054   BP:  128/74   Pulse:  68   Resp:  18   Temp:  (!) 96.4 °F (35.8 °C)   TempSrc:  Temporal   SpO2:  99%   Weight: 156 lb (70.8 kg) 156 lb (70.8 kg)   Height: 5' 11\" (1.803 m) 5' 11\" (1.803 m)                                              BP Readings from Last 3 Encounters:   22 128/74   22 106/68   22 116/64       NPO Status: Time of last liquid consumption:                         Time of last solid consumption:                         Date of last liquid consumption: 22                        Date of last solid food consumption: 22    BMI:   Wt Readings from Last 3 Encounters:   22 156 lb (70.8 kg)   22 160 lb (72.6 kg)   22 157 lb 9.6 oz (71.5 kg)     Body mass index is 21.76 kg/m².     CBC:   Lab Results   Component Value Date/Time    WBC 8.7 2019 03:03 PM    RBC 4.02 2019 03:03 PM    HGB 12.9 2019 03:03 PM    HCT 38.8 2019 03:03 PM    MCV 96.5 2019 03:03 PM    RDW 14.0 2019 03:03 PM     2019 03:03 PM       CMP:   Lab Results   Component Value Date/Time     2022 10:47 AM    K 4.6 2022 10:47 AM     2022 10:47 AM    CO2 19 2022 10:47 AM    BUN 20 2022 10:47 AM    CREATININE 1.1 2022 10:47 AM    GFRAA >60 2022 10:47 AM    GFRAA >60 2012 04:13 PM    AGRATIO 1.9 2022 10:47 AM    LABGLOM >60 2022 10:47 AM    GLUCOSE 77 2022 10:47 AM    PROT 7.8 04/13/2022 10:47 AM    PROT 7.5 03/30/2012 09:40 AM    CALCIUM 9.7 04/13/2022 10:47 AM    BILITOT <0.2 04/13/2022 10:47 AM    ALKPHOS 72 04/13/2022 10:47 AM    AST 48 04/13/2022 10:47 AM    ALT 29 04/13/2022 10:47 AM       POC Tests: No results for input(s): POCGLU, POCNA, POCK, POCCL, POCBUN, POCHEMO, POCHCT in the last 72 hours. Coags: No results found for: PROTIME, INR, APTT    HCG (If Applicable): No results found for: PREGTESTUR, PREGSERUM, HCG, HCGQUANT     ABGs: No results found for: PHART, PO2ART, ZHV0ZKT, JYN5YCF, BEART, X3GNLRUN     Type & Screen (If Applicable):  No results found for: LABABO, LABRH    Drug/Infectious Status (If Applicable):  No results found for: HIV, HEPCAB    COVID-19 Screening (If Applicable):   Lab Results   Component Value Date/Time    COVID19 Not Detected 10/04/2021 08:59 AM           Anesthesia Evaluation  Patient summary reviewed and Nursing notes reviewed  Airway: Mallampati: I  TM distance: >3 FB   Neck ROM: full  Mouth opening: > = 3 FB   Dental: normal exam         Pulmonary:normal exam  breath sounds clear to auscultation  (+) sleep apnea:                             Cardiovascular:Negative CV ROS            Rhythm: regular  Rate: normal                    Neuro/Psych:   (+) psychiatric history:            GI/Hepatic/Renal:   (+) hiatal hernia, GERD:, PUD,           Endo/Other: Negative Endo/Other ROS                    Abdominal:             Vascular: negative vascular ROS. Other Findings:           Anesthesia Plan      MAC     ASA 2       Induction: intravenous. Anesthetic plan and risks discussed with patient. Plan discussed with CRNA.                     Marisabel Hay MD   9/19/2022

## 2022-09-19 NOTE — OP NOTE
[address] [facility] [address],  [address]  Phone: [phone]   FZK:[phone]   [address]  [address] [place] [address]  Phone: [phone]   XEF:[phone]    EGD Procedure Note    Patient: Tim Berkowitz  : 1950    Procedure: Esophagogastroduodenoscopy with biopsy    Date:  2022     Endoscopist:  India Roberts MD    Referring Physician:  Suhail Loera MD    Preoperative Diagnosis:  Sanchez's esophagus without dysplasia [K22.70]    Postoperative Diagnosis: Short segment Sanchez's esophagus    Anesthesia: Anesthesia: MAC  Sedation:  Propofol per anesthesia  Start Time: 2838  Stop Time: 1155  ASA Class: 2  Mallampati: II (soft palate, uvula, fauces visible)    Indications: This is a 81y.o. year old male with medical history of nondysplastic Sanchez's esophagus (C0M2), bipolar disorder, depression, diverticulosis, hyperlipidemia who returns for follow-up. Offers no complaints. Reports good compliance with omeprazole 20 mg daily. He has stopped late night meals at least 2-3 hours prior to bedtime. Procedure Details  Informed consent was obtained for the procedure, including conscious sedation. Risks of pancreatitis, infection, perforation, hemorrhage, adverse drug reaction and aspiration were discussed. The patient was placed in the left lateral decubitus position. Based on the pre-procedure assessment, including review of the patient's medical history, medications, allergies, and review of systems, he had been deemed to be an appropriate candidate for the above IV sedation; he was therefore sedated with the medications listed above. He was monitored continuously with ECG tracing, pulse oximetry, blood pressure monitoring, and direct observation. A gastroscope was inserted into the mouth and advanced under direct vision to second portion of the duodenum.   A careful inspection was made as the gastroscope was withdrawn, including a retroflexed view of the proximal stomach including views of the incisura and cardia; findings and interventions are described below. Appropriate photodocumentation Was Obtained. If photos taken, they were ordered to be scanned into the medical record. Findings:  Normal upper and mid esophagus  3 tongues of salmon colored mucosa from 40cm to 38 cm from incisors consistent with Sanchez's esophagus (C0M2). Biopsies taken for pathology  Irregular Z-line at 40 cm from incisors. A 5 cm sliding hiatal hernia. Normal stomach. Normal duodenal bulb and second portion of duodenum. Specimens: Was Obtained:     Complications:   None; patient tolerated the procedure well. Disposition:   PACU - hemodynamically stable. Estimated Blood loss:  none    Impression:   Normal upper and mid esophagus  3 tongues of salmon colored mucosa from 40cm to 38 cm from incisors consistent with Sanchez's esophagus (C0M2). Biopsied  Irregular Z-line at 40 cm from incisors. A 5 cm sliding hiatal hernia. Normal stomach. Normal duodenal bulb and second portion of duodenum. Recommendations:  -Continue acid suppression. ,   -Await pathology. ,   -GERD diet: avoid carbonated/acid beverages, fried and fatty foods. peppermint, chocolate, alcohol, coffee, citrus fruits and juices, tomoato products; avoid lying down for 2 to 3 hours after eating, avoid tight fitting clothing. Weight loss frequent helps heartburn/reflux especially with the presence of a hiatal hernia. Dalton Lundy MD   [facility]  9/19/22 11:56 AM EDT    Please note that some or all of this record was generated using voice recognition software. If there are any questions about the content of this document, please contact the author as some errors in translation may have occurred.

## 2022-09-19 NOTE — DISCHARGE INSTRUCTIONS
PATIENT INSTRUCTIONS  POST-SEDATION    Neftaly Horne          IMMEDIATELY FOLLOWING PROCEDURE:    Do not drive or operate machinery for the first twenty four hours after surgery. Do not make any important decisions for twenty four hours after surgery or while taking narcotic pain medications or sedatives. You should NOT BE LEFT UNATTENDED OR ALONE. A responsible adult should be with you for the rest of the day of your procedure and also during the night for your protection and safety. You may experience some light headedness. Rest at home with activity as tolerated. You may not need to go to bed, but it is important to rest for the next 24 hours. You should not engage in athletic sports such as basketball, volleyball, jogging, skating, or activities requiring refined motor skills for 24 hours. If you develop intractable nausea and vomiting or a severe headache please notify your doctor immediately. You are not expected to have any fever, but if you feel warm, take your temperature. If you have a fever 101 degrees or higher, call your doctor. If you have had an Endoscopy:   *Eat lightly for your first meal and gradually resume your normal / prescribed diet. DO NOT eat or drink until your gag reflex returns. *If you have a sore throat you may use lozenges, or salt water gargles. *If you have had a colonoscopy, do not expect a normal bowel movement for approximately three days due to the cleansing of the large intestine prior to colonoscopy. ONCE YOU ARE HOME, IF YOU SHOULD HAVE:  Difficulty in breathing, persistent nausea or vomiting, bleeding you feel is excessive, or pain that is unusual, increased abdominal bloating, or any swelling, fever / chills, call your physician. If you cannot contact your physician, but feel that your signs and symptoms need a physician's attention, go to the Emergency Department.       FOLLOW-UP:    Please follow up with Dr. Tatiana Rogel as scheduled or needed. Dr. Patricia Bellamy office will call you with the biopsy findings. Call Dr. Fawn Andrews if there are any GI concerns. [phone].

## 2022-09-19 NOTE — ANESTHESIA POSTPROCEDURE EVALUATION
Department of Anesthesiology  Postprocedure Note    Patient: Chris Alexander  MRN: [id number]  YOB: 1950  Date of evaluation: 9/19/2022      Procedure Summary     Date: 09/19/22 Room / Location: [address] / [facility]    Anesthesia Start: 1148 Anesthesia Stop: 1159    Procedure: EGD BIOPSY Diagnosis:       Sanchez's esophagus without dysplasia      (Sanchez's esophagus without dysplasia [K22.70])    Surgeons: Antionette Hernandez MD Responsible Provider: Lyudmila Louis MD    Anesthesia Type: MAC ASA Status: 2          Anesthesia Type: No value filed.     Leyla Phase I: Leyla Score: 10    Leyla Phase II: Leyla Score: 10      Anesthesia Post Evaluation    Patient location during evaluation: PACU  Patient participation: complete - patient participated  Level of consciousness: awake and alert  Pain score: 0  Airway patency: patent  Nausea & Vomiting: no nausea and no vomiting  Complications: no  Cardiovascular status: blood pressure returned to baseline  Respiratory status: acceptable  Hydration status: stable

## 2022-09-23 RX ORDER — OMEPRAZOLE 40 MG/1
40 CAPSULE, DELAYED RELEASE ORAL
Qty: 90 CAPSULE | Refills: 3 | Status: SHIPPED | OUTPATIENT
Start: 2022-09-23 | End: 2022-12-22

## 2022-10-14 ENCOUNTER — TELEPHONE (OUTPATIENT)
Dept: FAMILY MEDICINE CLINIC | Age: 72
End: 2022-10-14

## 2022-10-14 DIAGNOSIS — F51.04 CHRONIC INSOMNIA: ICD-10-CM

## 2022-10-14 RX ORDER — ZOLPIDEM TARTRATE 10 MG/1
TABLET ORAL
Qty: 30 TABLET | Refills: 0 | Status: SHIPPED | OUTPATIENT
Start: 2022-10-14 | End: 2022-11-12

## 2022-10-14 NOTE — TELEPHONE ENCOUNTER
Patient contacted the office req a refill on  Ambien 10mg tabs  Last visit 8/16/22  Future 4/13/23  Areli preciado

## 2022-10-14 NOTE — TELEPHONE ENCOUNTER
Last Office Visit  -  8/16/22  Next Office Visit  -  4/13/23    Last Filled  -  9/14/22  Last UDS -  3/8/18  Contract -  8/25/16

## 2022-11-15 DIAGNOSIS — F51.04 CHRONIC INSOMNIA: ICD-10-CM

## 2022-11-15 RX ORDER — ZOLPIDEM TARTRATE 10 MG/1
TABLET ORAL
Qty: 30 TABLET | Refills: 0 | Status: SHIPPED | OUTPATIENT
Start: 2022-11-15 | End: 2022-12-15

## 2022-11-15 NOTE — TELEPHONE ENCOUNTER
Last Office Visit  -  8/16/22  Next Office Visit  -  4/13/23    Last Filled  -  10/14/22  Last UDS -  3/8/18  Contract -  9/9/16

## 2022-12-14 DIAGNOSIS — F51.04 CHRONIC INSOMNIA: ICD-10-CM

## 2022-12-14 RX ORDER — ZOLPIDEM TARTRATE 10 MG/1
TABLET ORAL
Qty: 30 TABLET | Refills: 0 | Status: SHIPPED | OUTPATIENT
Start: 2022-12-14 | End: 2023-01-13

## 2022-12-14 NOTE — TELEPHONE ENCOUNTER
Last Office Visit  -  8/16/22- NB  Next Office Visit  -  4/13/23    Last Filled  -  11/15/22  Last UDS -    Contract -

## 2023-01-13 DIAGNOSIS — F51.04 CHRONIC INSOMNIA: ICD-10-CM

## 2023-01-13 RX ORDER — ZOLPIDEM TARTRATE 10 MG/1
TABLET ORAL
Qty: 30 TABLET | Refills: 0 | Status: SHIPPED | OUTPATIENT
Start: 2023-01-13

## 2023-01-13 NOTE — TELEPHONE ENCOUNTER
Last Office Visit  -  8/16/22  Next Office Visit  -  4/13/23    Last Filled  -  12/14/22  Last UDS -  3/8/18  Contract -  n/a

## 2023-01-17 DIAGNOSIS — F51.04 CHRONIC INSOMNIA: ICD-10-CM

## 2023-01-18 RX ORDER — ZOLPIDEM TARTRATE 10 MG/1
10 TABLET ORAL NIGHTLY PRN
Qty: 30 TABLET | Refills: 0 | Status: SHIPPED | OUTPATIENT
Start: 2023-01-18 | End: 2023-02-16

## 2023-01-18 RX ORDER — ZOLPIDEM TARTRATE 10 MG/1
TABLET ORAL
Qty: 30 TABLET | OUTPATIENT
Start: 2023-01-18

## 2023-01-18 NOTE — TELEPHONE ENCOUNTER
Last Office Visit  -  08/16/2022  Next Office Visit  -  01/19/2023    Last Filled  -    Last UDS -    Contract -

## 2023-01-19 ENCOUNTER — TELEMEDICINE (OUTPATIENT)
Dept: FAMILY MEDICINE CLINIC | Age: 73
End: 2023-01-19

## 2023-01-19 DIAGNOSIS — F31.81 BIPOLAR 2 DISORDER (HCC): ICD-10-CM

## 2023-01-19 DIAGNOSIS — F51.04 CHRONIC INSOMNIA: Primary | ICD-10-CM

## 2023-01-19 RX ORDER — ARIPIPRAZOLE 5 MG/1
5 TABLET ORAL DAILY
COMMUNITY

## 2023-01-19 ASSESSMENT — PATIENT HEALTH QUESTIONNAIRE - PHQ9
SUM OF ALL RESPONSES TO PHQ QUESTIONS 1-9: 0
6. FEELING BAD ABOUT YOURSELF - OR THAT YOU ARE A FAILURE OR HAVE LET YOURSELF OR YOUR FAMILY DOWN: 0
SUM OF ALL RESPONSES TO PHQ QUESTIONS 1-9: 0
3. TROUBLE FALLING OR STAYING ASLEEP: 0
1. LITTLE INTEREST OR PLEASURE IN DOING THINGS: 0
8. MOVING OR SPEAKING SO SLOWLY THAT OTHER PEOPLE COULD HAVE NOTICED. OR THE OPPOSITE, BEING SO FIGETY OR RESTLESS THAT YOU HAVE BEEN MOVING AROUND A LOT MORE THAN USUAL: 0
5. POOR APPETITE OR OVEREATING: 0
SUM OF ALL RESPONSES TO PHQ9 QUESTIONS 1 & 2: 0
4. FEELING TIRED OR HAVING LITTLE ENERGY: 0
2. FEELING DOWN, DEPRESSED OR HOPELESS: 0
SUM OF ALL RESPONSES TO PHQ QUESTIONS 1-9: 0
10. IF YOU CHECKED OFF ANY PROBLEMS, HOW DIFFICULT HAVE THESE PROBLEMS MADE IT FOR YOU TO DO YOUR WORK, TAKE CARE OF THINGS AT HOME, OR GET ALONG WITH OTHER PEOPLE: 0
9. THOUGHTS THAT YOU WOULD BE BETTER OFF DEAD, OR OF HURTING YOURSELF: 0
SUM OF ALL RESPONSES TO PHQ QUESTIONS 1-9: 0
7. TROUBLE CONCENTRATING ON THINGS, SUCH AS READING THE NEWSPAPER OR WATCHING TELEVISION: 0

## 2023-01-19 NOTE — PROGRESS NOTES
2023    TELEHEALTH EVALUATION -- Audio/Visual (During ZDLWO-30 public health emergency)    HPI:    Aaron West (:  1950) has requested an audio/video evaluation for the following concern(s):    Pt with hld, bpd (Dr America Ware), chronic insomnia, erosive esophagitis  (Dr Laura Whiting) had repeat EGD 19 and Barretts esoph was found. Pt conts to avoid etoh since . Conts bid omep 20 and is minimizing the 4 Cs (caffeine, citrus, chocolate, carbonation). No epigastric pain. Last EGD 2022. Had 10/8/21 cscope, nl except small hemorrhoid, f/u in 10 yr.     Dr Guzman Carlos increased Abilify 2 to 5 mg and pt notes depression has improved. Feels physically and mentally healthy. Was out of ambien x last 2 nights. Used Zquil, helpful. Review of Systems  As above        Prior to Visit Medications    Medication Sig Taking? Authorizing Provider   zolpidem (AMBIEN) 10 MG tablet Take 1 tablet by mouth nightly as needed for Sleep for up to 30 days.  Max Daily Amount: 10 mg Yes Anu Mccarty MD   venlafaxine (EFFEXOR XR) 75 MG extended release capsule Take 75 mg by mouth daily Takes with 37.5 mg for total of 112.5 mg daily Yes Historical Provider, MD   venlafaxine (EFFEXOR XR) 37.5 MG extended release capsule Take 37.5 mg by mouth daily Takes with 75 mg for total of 112.5 mg daily Yes Historical Provider, MD   sildenafil (VIAGRA) 100 MG tablet TAKE ONE TABLET BY MOUTH DAILY AS NEEDED FOR ERECTILE DYSFUNCTION Yes Anu Mccarty MD   ARIPiprazole (ABILIFY) 2 MG tablet Take 2 mg by mouth daily Yes Historical Provider, MD   cetirizine-psuedoephedrine (ZYRTEC-D) 5-120 MG per extended release tablet Take 1 tablet by mouth as needed Yes Historical Provider, MD   lithium 300 MG capsule Take 300 mg by mouth every evening  Yes Historical Provider, MD   omeprazole (PRILOSEC) 40 MG delayed release capsule Take 1 capsule by mouth every morning (before breakfast)  Chelle Del Angel MD       Social History     Tobacco Use    Smoking status: Former     Packs/day: 1.00     Years: 12.00     Pack years: 12.00     Types: Cigarettes     Quit date: 01/2008     Years since quitting: 15.0    Smokeless tobacco: Never   Vaping Use    Vaping Use: Never used   Substance Use Topics    Alcohol use: Not Currently     Alcohol/week: 20.0 standard drinks     Types: 20 Cans of beer per week     Comment: quit 2013    Drug use: No     Comment: quit marijauna 2013            PHYSICAL EXAMINATION:  [ INSTRUCTIONS:  \"[x]\" Indicates a positive item  \"[]\" Indicates a negative item  -- DELETE ALL ITEMS NOT EXAMINED]      Constitutional: [x] Appears well-developed and well-nourished [x] No apparent distress      [] Abnormal-   Mental status  [x] Alert and awake  [x] Oriented to person/place/time [x]Able to follow commands      Eyes:  EOM    [x]  Normal  [] Abnormal-  Sclera  [x]  Normal  [] Abnormal -         Discharge [x]  None visible  [] Abnormal -    HENT:   [x] Normocephalic, atraumatic. [] Abnormal   [] Mouth/Throat: Mucous membranes are moist.     External Ears [x] Normal  [] Abnormal-     Neck: [x] No visualized mass     Pulmonary/Chest: [x] Respiratory effort normal.  [x] No visualized signs of difficulty breathing or respiratory distress        [] Abnormal-      Musculoskeletal:   [] Normal gait with no signs of ataxia         [x] Normal range of motion of neck        [] Abnormal-       Neurological:        [x] No Facial Asymmetry (Cranial nerve 7 motor function) (limited exam to video visit)          [x] No gaze palsy        [] Abnormal-         Skin:        [x] No significant exanthematous lesions or discoloration noted on facial skin         [] Abnormal-            Psychiatric:       [x] Normal Affect [] No Hallucinations        [] Abnormal-     Other pertinent observable physical exam findings-     ASSESSMENT/PLAN:  1. Chronic insomnia  - Pt conts ambien 10 qhs, though when out of rx x past 2 nights, pt found he responded to [address]  Pt may not be fully dependent on ambien 10 and this is asked to reduce to 1/2 pill or 5 mg at times. 2. Bipolar 2 disorder ([facility] [address]  - doing well since Dr Tisha Burdick increased abilify to 5 mg qhs. Pt also conts effexor, lithium. No follow-ups on file. Lorna Laura is a 81 y.o. male being evaluated by a Virtual Visit (video visit) encounter to address concerns as mentioned above. A caregiver was present when appropriate. Due to this being a TeleHealth encounter (During WZXUK-91 public health emergency), evaluation of the following organ systems was limited: Vitals/Constitutional/EENT/Resp/CV/GI//MS/Neuro/Skin/Heme-Lymph-Imm. Pursuant to the emergency declaration under the [address] [place] [address] and the [employer] Act, this Virtual Visit was conducted with patient's (and/or legal guardian's) consent, to reduce the patient's risk of exposure to COVID-19 and provide necessary medical care. The patient (and/or legal guardian) has also been advised to contact this office for worsening conditions or problems, and seek emergency medical treatment and/or call 1 if deemed necessary. Lorna Laura, was evaluated through a synchronous (real-time) audio-video encounter. The patient (or guardian if applicable) is aware that this is a billable service, which includes applicable co-pays. This Virtual Visit was conducted with patient's (and/or legal guardian's) consent. The visit was conducted pursuant to the emergency declaration under the [address] [place] [address] and the [employer] Act. Patient identification was verified, and a caregiver was present when appropriate. The patient was located at Home: [address] [place] [address] Road [address]. Provider was located at Home ([address]: [place].         Total time spent for this encounter: Not billed by time    --Thea Mcknight MD on 1/19/2023 at 5:20 PM    An electronic signature was used to authenticate this note. Patient identification was verified at the start of the visit: Yes    Services were provided through a video synchronous discussion virtually to substitute for in-person clinic visit. Patient and provider were located at their individual homes.

## 2023-02-16 DIAGNOSIS — F51.04 CHRONIC INSOMNIA: ICD-10-CM

## 2023-02-16 RX ORDER — ZOLPIDEM TARTRATE 10 MG/1
TABLET ORAL
Qty: 30 TABLET | Refills: 0 | Status: SHIPPED | OUTPATIENT
Start: 2023-02-16 | End: 2023-03-18

## 2023-02-16 NOTE — TELEPHONE ENCOUNTER
Last Office Visit  -  1/19/23  Next Office Visit  -  4/13/23    Last Filled  -  1/18/23  Last UDS -  3/8/18  Contract -  9/9/16

## 2023-03-16 DIAGNOSIS — F51.04 CHRONIC INSOMNIA: ICD-10-CM

## 2023-03-16 RX ORDER — ZOLPIDEM TARTRATE 10 MG/1
TABLET ORAL
Qty: 30 TABLET | Refills: 0 | Status: SHIPPED | OUTPATIENT
Start: 2023-03-16 | End: 2023-04-15

## 2023-04-16 DIAGNOSIS — F51.04 CHRONIC INSOMNIA: Primary | ICD-10-CM

## 2023-04-17 RX ORDER — ZOLPIDEM TARTRATE 10 MG/1
TABLET ORAL
Qty: 30 TABLET | Refills: 0 | Status: SHIPPED | OUTPATIENT
Start: 2023-04-17 | End: 2023-05-17

## 2023-04-17 RX ORDER — SILDENAFIL 100 MG/1
100 TABLET, FILM COATED ORAL DAILY PRN
Qty: 6 TABLET | Refills: 5 | Status: SHIPPED | OUTPATIENT
Start: 2023-04-17

## 2023-04-17 NOTE — TELEPHONE ENCOUNTER
Last Office Visit  -  4/13/23  Next Office Visit  -  n/a    Last Filled  -  3/16/23  Last UDS -  3/8/18  Contract -  9/9/16

## 2023-04-17 NOTE — TELEPHONE ENCOUNTER
Last Office Visit  -  4/13/23  Next Office Visit  -  n/a    Last Filled  -  3/31/22  Last UDS -    Contract -

## 2023-04-25 ENCOUNTER — TELEPHONE (OUTPATIENT)
Dept: INTERNAL MEDICINE CLINIC | Age: 73
End: 2023-04-25

## 2023-04-25 NOTE — TELEPHONE ENCOUNTER
Called pt to make sure he was aware Dr Gera Benitez is out of the office today and would not see message until Wednesday. He still did not want to make appt and said it was fine to wait until tomorrow to know what she says.

## 2023-04-25 NOTE — TELEPHONE ENCOUNTER
Quick onset of pain the (L) eye on 4/24/23. Hi eye immediately clouded up and experienced flashes of light with headache on the left side as well. Eye is also bloodshot. Headache again today, Pain level of a 2. I offered an appointment but he declined until speaking with PCP.     Pascual Melvin  [phone]

## 2023-04-26 NOTE — TELEPHONE ENCOUNTER
Pls tell pt that I want him to see an eye dr, ideally today if possible. I wonder if he could be experiencing a retinal detachment. If he doesn't have an eye dr, I recommend the optometry office within [facility] at [address]. They do a very good job and usually have daily appts avail.

## 2023-05-16 DIAGNOSIS — F51.04 CHRONIC INSOMNIA: ICD-10-CM

## 2023-05-16 RX ORDER — ZOLPIDEM TARTRATE 10 MG/1
TABLET ORAL
Qty: 30 TABLET | Refills: 0 | Status: SHIPPED | OUTPATIENT
Start: 2023-05-16 | End: 2023-06-15

## 2023-05-16 NOTE — TELEPHONE ENCOUNTER
Last Office Visit  -  04/13/2023  Next Office Visit  -  07/10/2023    Last Filled  -  04/17/2023  Last UDS -  N/A  Contract -  N/A

## 2023-05-16 NOTE — TELEPHONE ENCOUNTER
Last PDMP Paola Burns as Reviewed:  Review User Review Instant Review Result   CONTRERAS YUSUF 5/16/2023  4:18 PM Reviewed PDMP [1]   -Checked PDMP today which shows ambien 10mg qd. Last filled 04/17/23. Pt compliant with medications with no signs of misuse. Will refill.     [place]

## 2023-07-10 ENCOUNTER — OFFICE VISIT (OUTPATIENT)
Dept: FAMILY MEDICINE CLINIC | Age: 73
End: 2023-07-10

## 2023-07-10 VITALS
BODY MASS INDEX: 22.68 KG/M2 | SYSTOLIC BLOOD PRESSURE: 100 MMHG | HEART RATE: 60 BPM | DIASTOLIC BLOOD PRESSURE: 70 MMHG | HEIGHT: 71 IN | WEIGHT: 162 LBS | OXYGEN SATURATION: 95 %

## 2023-07-10 DIAGNOSIS — K22.70 BARRETT'S ESOPHAGUS WITHOUT DYSPLASIA: ICD-10-CM

## 2023-07-10 DIAGNOSIS — E78.2 MIXED HYPERLIPIDEMIA: ICD-10-CM

## 2023-07-10 DIAGNOSIS — R06.83 SNORING: ICD-10-CM

## 2023-07-10 DIAGNOSIS — Z79.899 LITHIUM USE: ICD-10-CM

## 2023-07-10 DIAGNOSIS — F51.04 CHRONIC INSOMNIA: ICD-10-CM

## 2023-07-10 DIAGNOSIS — Z00.00 MEDICARE ANNUAL WELLNESS VISIT, SUBSEQUENT: Primary | ICD-10-CM

## 2023-07-10 DIAGNOSIS — Z83.49 FAMILY HISTORY OF HYPOTHYROIDISM: ICD-10-CM

## 2023-07-10 LAB
ALBUMIN SERPL-MCNC: 4.7 G/DL (ref 3.4–5)
ALBUMIN/GLOB SERPL: 1.7 {RATIO} (ref 1.1–2.2)
ALP SERPL-CCNC: 89 U/L (ref 40–129)
ALT SERPL-CCNC: 37 U/L (ref 10–40)
ANION GAP SERPL CALCULATED.3IONS-SCNC: 8 MMOL/L (ref 3–16)
AST SERPL-CCNC: 37 U/L (ref 15–37)
BILIRUB SERPL-MCNC: <0.2 MG/DL (ref 0–1)
BUN SERPL-MCNC: 18 MG/DL (ref 7–20)
CALCIUM SERPL-MCNC: 10 MG/DL (ref 8.3–10.6)
CHLORIDE SERPL-SCNC: 102 MMOL/L (ref 99–110)
CHOLEST SERPL-MCNC: 212 MG/DL (ref 0–199)
CO2 SERPL-SCNC: 30 MMOL/L (ref 21–32)
CREAT SERPL-MCNC: 1 MG/DL (ref 0.8–1.3)
GFR SERPLBLD CREATININE-BSD FMLA CKD-EPI: >60 ML/MIN/{1.73_M2}
GLUCOSE SERPL-MCNC: 97 MG/DL (ref 70–99)
HDLC SERPL-MCNC: 66 MG/DL (ref 40–60)
LDLC SERPL CALC-MCNC: 118 MG/DL
POTASSIUM SERPL-SCNC: 5.4 MMOL/L (ref 3.5–5.1)
PROT SERPL-MCNC: 7.4 G/DL (ref 6.4–8.2)
SODIUM SERPL-SCNC: 140 MMOL/L (ref 136–145)
TRIGL SERPL-MCNC: 141 MG/DL (ref 0–150)
TSH SERPL DL<=0.005 MIU/L-ACNC: 2.64 UIU/ML (ref 0.27–4.2)
VLDLC SERPL CALC-MCNC: 28 MG/DL

## 2023-07-10 RX ORDER — ZOLPIDEM TARTRATE 10 MG/1
TABLET ORAL
Qty: 30 TABLET | Refills: 0 | Status: SHIPPED | OUTPATIENT
Start: 2023-07-10 | End: 2023-08-08

## 2023-07-10 ASSESSMENT — PATIENT HEALTH QUESTIONNAIRE - PHQ9
SUM OF ALL RESPONSES TO PHQ9 QUESTIONS 1 & 2: 0
6. FEELING BAD ABOUT YOURSELF - OR THAT YOU ARE A FAILURE OR HAVE LET YOURSELF OR YOUR FAMILY DOWN: 0
8. MOVING OR SPEAKING SO SLOWLY THAT OTHER PEOPLE COULD HAVE NOTICED. OR THE OPPOSITE, BEING SO FIGETY OR RESTLESS THAT YOU HAVE BEEN MOVING AROUND A LOT MORE THAN USUAL: 0
2. FEELING DOWN, DEPRESSED OR HOPELESS: 0
5. POOR APPETITE OR OVEREATING: 0
7. TROUBLE CONCENTRATING ON THINGS, SUCH AS READING THE NEWSPAPER OR WATCHING TELEVISION: 0
9. THOUGHTS THAT YOU WOULD BE BETTER OFF DEAD, OR OF HURTING YOURSELF: 0
SUM OF ALL RESPONSES TO PHQ QUESTIONS 1-9: 0
1. LITTLE INTEREST OR PLEASURE IN DOING THINGS: 0
SUM OF ALL RESPONSES TO PHQ QUESTIONS 1-9: 0
SUM OF ALL RESPONSES TO PHQ QUESTIONS 1-9: 0
3. TROUBLE FALLING OR STAYING ASLEEP: 0
4. FEELING TIRED OR HAVING LITTLE ENERGY: 0
SUM OF ALL RESPONSES TO PHQ QUESTIONS 1-9: 0
10. IF YOU CHECKED OFF ANY PROBLEMS, HOW DIFFICULT HAVE THESE PROBLEMS MADE IT FOR YOU TO DO YOUR WORK, TAKE CARE OF THINGS AT HOME, OR GET ALONG WITH OTHER PEOPLE: 0

## 2023-07-10 ASSESSMENT — LIFESTYLE VARIABLES: HOW OFTEN DO YOU HAVE A DRINK CONTAINING ALCOHOL: NEVER

## 2023-07-10 NOTE — PROGRESS NOTES
allergies  were all reviewed and updated as appropriate today. Review of Systems  As above    Physical Exam  Constitutional:       General: He is not in acute distress. Appearance: Normal appearance. He is well-developed. He is not ill-appearing. HENT:      Head: Normocephalic and atraumatic. Right Ear: Tympanic membrane, ear canal and external ear normal.      Left Ear: Tympanic membrane, ear canal and external ear normal.      Mouth/Throat:      Pharynx: Oropharynx is clear. No oropharyngeal exudate. Eyes:      General: No scleral icterus. Right eye: No discharge. Left eye: No discharge. Extraocular Movements: Extraocular movements intact. Conjunctiva/sclera: Conjunctivae normal.   Neck:      Vascular: No carotid bruit. Comments: Neg TMG  Cardiovascular:      Rate and Rhythm: Normal rate and regular rhythm. Pulses: Normal pulses. Heart sounds: Normal heart sounds. No murmur heard. Pulmonary:      Effort: Pulmonary effort is normal. No respiratory distress. Breath sounds: Normal breath sounds. Abdominal:      General: Bowel sounds are normal. There is no distension. Palpations: Abdomen is soft. There is no mass. Tenderness: There is no abdominal tenderness. Musculoskeletal:         General: Normal range of motion. Cervical back: Normal range of motion and neck supple. Right lower leg: No edema. Left lower leg: No edema. Lymphadenopathy:      Cervical: No cervical adenopathy. Skin:     General: Skin is warm and dry. Capillary Refill: Capillary refill takes less than 2 seconds. Coloration: Skin is not jaundiced or pale. Findings: No rash. Neurological:      General: No focal deficit present. Mental Status: He is alert and oriented to person, place, and time. Cranial Nerves: No cranial nerve deficit. Deep Tendon Reflexes: Reflexes are normal and symmetric.    Psychiatric:         Mood and

## 2023-08-20 DIAGNOSIS — F51.04 CHRONIC INSOMNIA: ICD-10-CM

## 2023-08-21 RX ORDER — ZOLPIDEM TARTRATE 10 MG/1
TABLET ORAL
Qty: 30 TABLET | Refills: 0 | Status: SHIPPED | OUTPATIENT
Start: 2023-08-21 | End: 2023-09-20

## 2023-08-21 NOTE — TELEPHONE ENCOUNTER
Last Office Visit  -  7/10/23  Next Office Visit  -  n/a    Last Filled  -  7/10/23  Last UDS -  3/8/18  Contract -  9/9/16

## 2023-09-22 DIAGNOSIS — F51.04 CHRONIC INSOMNIA: ICD-10-CM

## 2023-09-22 RX ORDER — ZOLPIDEM TARTRATE 10 MG/1
TABLET ORAL
Qty: 30 TABLET | Refills: 0 | Status: SHIPPED | OUTPATIENT
Start: 2023-09-22 | End: 2023-10-22

## 2023-09-22 NOTE — TELEPHONE ENCOUNTER
Last Office Visit  -  7/10/23  Next Office Visit  -      Last Filled  -  8/21/23  Last UDS -    Contract -

## 2023-10-23 DIAGNOSIS — F51.04 CHRONIC INSOMNIA: ICD-10-CM

## 2023-10-23 NOTE — TELEPHONE ENCOUNTER
Last Office Visit  -  7/10/23  Next Office Visit  -  n/s    Last Filled  -  9/22/23  Last UDS -  3/8/18  Contract -  9/9/16

## 2023-10-24 RX ORDER — ZOLPIDEM TARTRATE 10 MG/1
TABLET ORAL
Qty: 30 TABLET | Refills: 0 | Status: SHIPPED | OUTPATIENT
Start: 2023-10-24 | End: 2023-11-23

## 2023-11-28 DIAGNOSIS — F51.04 CHRONIC INSOMNIA: ICD-10-CM

## 2023-11-28 RX ORDER — ZOLPIDEM TARTRATE 10 MG/1
TABLET ORAL
Qty: 30 TABLET | Refills: 0 | Status: SHIPPED | OUTPATIENT
Start: 2023-11-28 | End: 2023-12-28

## 2023-11-28 NOTE — TELEPHONE ENCOUNTER
Last Office Visit  -  7/10/2023  Next Office Visit  -  No future appointments.     Last Filled  -  10/24/23  Last UDS -  03/08/18  Contract -  08/25/16

## 2023-12-26 DIAGNOSIS — F51.04 CHRONIC INSOMNIA: ICD-10-CM

## 2023-12-26 RX ORDER — ZOLPIDEM TARTRATE 10 MG/1
TABLET ORAL
Qty: 30 TABLET | Refills: 0 | Status: SHIPPED | OUTPATIENT
Start: 2023-12-26 | End: 2024-01-26

## 2023-12-26 NOTE — TELEPHONE ENCOUNTER
Last Office Visit  -  7/10/23  Next Office Visit  -      Last Filled  -  11/28/23  Last UDS -    Contract -

## 2024-01-26 ENCOUNTER — OFFICE VISIT (OUTPATIENT)
Dept: FAMILY MEDICINE CLINIC | Age: 74
End: 2024-01-26

## 2024-01-26 ENCOUNTER — TELEPHONE (OUTPATIENT)
Dept: FAMILY MEDICINE CLINIC | Age: 74
End: 2024-01-26

## 2024-01-26 VITALS
WEIGHT: 169.6 LBS | DIASTOLIC BLOOD PRESSURE: 76 MMHG | HEIGHT: 71 IN | SYSTOLIC BLOOD PRESSURE: 122 MMHG | HEART RATE: 81 BPM | OXYGEN SATURATION: 96 % | BODY MASS INDEX: 23.74 KG/M2

## 2024-01-26 DIAGNOSIS — F51.04 CHRONIC INSOMNIA: ICD-10-CM

## 2024-01-26 RX ORDER — OMEPRAZOLE 40 MG/1
40 CAPSULE, DELAYED RELEASE ORAL
Qty: 90 CAPSULE | Refills: 1 | Status: SHIPPED | OUTPATIENT
Start: 2024-01-26 | End: 2024-07-24

## 2024-01-26 RX ORDER — ZOLPIDEM TARTRATE 10 MG/1
10 TABLET ORAL NIGHTLY PRN
Qty: 30 TABLET | Refills: 2 | Status: SHIPPED | OUTPATIENT
Start: 2024-01-26 | End: 2024-04-25

## 2024-01-26 ASSESSMENT — ENCOUNTER SYMPTOMS
RESPIRATORY NEGATIVE: 1
GASTROINTESTINAL NEGATIVE: 1

## 2024-01-26 NOTE — TELEPHONE ENCOUNTER
Rec'd a refill request from [facility] for Ambien. There is a note that says the pt needs to schedule an appt before med is refilled. Left VM for pt to call and schedule a med check appt.

## 2024-01-26 NOTE — PROGRESS NOTES
Patient: Neftaly Horne is a 73 y.o. male who presents today with the following Chief Complaint(s):  Chief Complaint   Patient presents with    Medication Check       Assessment:  Encounter Diagnosis   Name Primary?    Chronic insomnia        Plan:  1. Chronic insomnia  Stable continue with Ambien 10 mg nightly.  Refill sent to pharmacy.  Will follow-up with PCP in July for annual wellness exam.      HPI  Patient presented follow-up on insomnia.  He reports he has been taking Ambien 10 mg nightly the past several years.  He states it works well for him and denies any side effects.  He does need a refill today.  He states he is fairly active and does yoga and Pilates.  He will be due for his wellness exam in July.      Current Outpatient Medications   Medication Sig Dispense Refill    zolpidem (AMBIEN) 10 MG tablet TAKE ONE TABLET BY MOUTH ONCE NIGHTLY AS NEEDED FOR SLEEP 30 tablet 0    sildenafil (VIAGRA) 100 MG tablet Take 1 tablet by mouth daily as needed for Erectile Dysfunction 6 tablet 5    ARIPiprazole (ABILIFY) 5 MG tablet Take 1 tablet by mouth daily      omeprazole (PRILOSEC) 40 MG delayed release capsule Take 1 capsule by mouth every morning (before breakfast) 90 capsule 3    venlafaxine (EFFEXOR XR) 75 MG extended release capsule Take 1 capsule by mouth daily Takes with 37.5 mg for total of 112.5 mg daily      cetirizine-psuedoephedrine (ZYRTEC-D) 5-120 MG per extended release tablet Take 1 tablet by mouth as needed      lithium 300 MG capsule Take 1 capsule by mouth every evening       No current facility-administered medications for this visit.       Patient's past medical history, surgical history, family history, medications,and allergies  were all reviewed and updated as appropriate today.      Review of Systems   Constitutional: Negative.    HENT: Negative.     Respiratory: Negative.     Cardiovascular: Negative.    Gastrointestinal: Negative.    Musculoskeletal: Negative.    Skin: Negative.

## 2024-04-26 DIAGNOSIS — F51.04 CHRONIC INSOMNIA: ICD-10-CM

## 2024-04-26 RX ORDER — ZOLPIDEM TARTRATE 10 MG/1
10 TABLET ORAL NIGHTLY PRN
Qty: 30 TABLET | Refills: 2 | Status: SHIPPED | OUTPATIENT
Start: 2024-04-26 | End: 2024-07-25

## 2024-04-26 NOTE — TELEPHONE ENCOUNTER
Last Office Visit  -  1/26/24  Next Office Visit  -  6/27/24    Last Filled  -    Last UDS -    Contract -

## 2024-06-27 ENCOUNTER — OFFICE VISIT (OUTPATIENT)
Dept: FAMILY MEDICINE CLINIC | Age: 74
End: 2024-06-27

## 2024-06-27 VITALS
OXYGEN SATURATION: 98 % | WEIGHT: 162 LBS | SYSTOLIC BLOOD PRESSURE: 120 MMHG | HEART RATE: 69 BPM | DIASTOLIC BLOOD PRESSURE: 80 MMHG | BODY MASS INDEX: 22.68 KG/M2 | HEIGHT: 71 IN

## 2024-06-27 DIAGNOSIS — F51.04 CHRONIC INSOMNIA: ICD-10-CM

## 2024-06-27 DIAGNOSIS — Z12.5 PROSTATE CANCER SCREENING: ICD-10-CM

## 2024-06-27 DIAGNOSIS — R20.2 PARESTHESIA: ICD-10-CM

## 2024-06-27 DIAGNOSIS — R26.89 IMBALANCE: ICD-10-CM

## 2024-06-27 DIAGNOSIS — E78.2 MIXED HYPERLIPIDEMIA: ICD-10-CM

## 2024-06-27 DIAGNOSIS — R68.2 DRY MOUTH: ICD-10-CM

## 2024-06-27 DIAGNOSIS — Z79.899 LITHIUM USE: ICD-10-CM

## 2024-06-27 DIAGNOSIS — Z00.00 MEDICARE ANNUAL WELLNESS VISIT, SUBSEQUENT: Primary | ICD-10-CM

## 2024-06-27 DIAGNOSIS — F31.81 BIPOLAR 2 DISORDER (HCC): ICD-10-CM

## 2024-06-27 RX ORDER — ZOLPIDEM TARTRATE 10 MG/1
10 TABLET ORAL NIGHTLY PRN
Qty: 30 TABLET | Refills: 2 | Status: SHIPPED | OUTPATIENT
Start: 2024-06-27 | End: 2024-09-25

## 2024-06-27 ASSESSMENT — LIFESTYLE VARIABLES: HOW OFTEN DO YOU HAVE A DRINK CONTAINING ALCOHOL: NEVER

## 2024-06-27 ASSESSMENT — PATIENT HEALTH QUESTIONNAIRE - PHQ9
1. LITTLE INTEREST OR PLEASURE IN DOING THINGS: NOT AT ALL
3. TROUBLE FALLING OR STAYING ASLEEP: NOT AT ALL
7. TROUBLE CONCENTRATING ON THINGS, SUCH AS READING THE NEWSPAPER OR WATCHING TELEVISION: NOT AT ALL
2. FEELING DOWN, DEPRESSED OR HOPELESS: NOT AT ALL
6. FEELING BAD ABOUT YOURSELF - OR THAT YOU ARE A FAILURE OR HAVE LET YOURSELF OR YOUR FAMILY DOWN: NOT AT ALL
SUM OF ALL RESPONSES TO PHQ QUESTIONS 1-9: 0
4. FEELING TIRED OR HAVING LITTLE ENERGY: NOT AT ALL
SUM OF ALL RESPONSES TO PHQ QUESTIONS 1-9: 0
SUM OF ALL RESPONSES TO PHQ9 QUESTIONS 1 & 2: 0
5. POOR APPETITE OR OVEREATING: NOT AT ALL
9. THOUGHTS THAT YOU WOULD BE BETTER OFF DEAD, OR OF HURTING YOURSELF: NOT AT ALL
8. MOVING OR SPEAKING SO SLOWLY THAT OTHER PEOPLE COULD HAVE NOTICED. OR THE OPPOSITE, BEING SO FIGETY OR RESTLESS THAT YOU HAVE BEEN MOVING AROUND A LOT MORE THAN USUAL: NOT AT ALL

## 2024-06-27 NOTE — PATIENT INSTRUCTIONS
not try to drive yourself.  Watch closely for changes in your health, and be sure to contact your doctor if you have any problems.  Where can you learn more?  Go to https://www.Text[employer]Me.net/patientEd and enter F075 to learn more about \"A Healthy Heart: Care Instructions.\"  Current as of: June 24, 2023  Content Version: 14.1  © 3584-9147 [employer].   Care instructions adapted under license by [employer]. If you have questions about a medical condition or this instruction, always ask your healthcare professional. [employer] disclaims any warranty or liability for your use of this information.      Personalized Preventive Plan for Neftaly Horne - 6/27/2024  Medicare offers a range of preventive health benefits. Some of the tests and screenings are paid in full while other may be subject to a deductible, co-insurance, and/or copay.    Some of these benefits include a comprehensive review of your medical history including lifestyle, illnesses that may run in your family, and various assessments and screenings as appropriate.    After reviewing your medical record and screening and assessments performed today your provider may have ordered immunizations, labs, imaging, and/or referrals for you.  A list of these orders (if applicable) as well as your Preventive Care list are included within your After Visit Summary for your review.    Other Preventive Recommendations:    A preventive eye exam performed by an eye specialist is recommended every 1-2 years to screen for glaucoma; cataracts, macular degeneration, and other eye disorders.  A preventive dental visit is recommended every 6 months.  Try to get at least 150 minutes of exercise per week or 10,000 steps per day on a pedometer .  Order or download the FREE \"Exercise & Physical Activity: Your Everyday Guide\" from [employer]. Call 1-705.156.4028 or search [employer] online.  You need 1017-2689

## 2024-06-28 LAB
ALBUMIN SERPL-MCNC: 4.2 G/DL (ref 3.4–5)
ALBUMIN/GLOB SERPL: 1.6 {RATIO} (ref 1.1–2.2)
ALP SERPL-CCNC: 72 U/L (ref 40–129)
ALT SERPL-CCNC: 23 U/L (ref 10–40)
ANION GAP SERPL CALCULATED.3IONS-SCNC: 12 MMOL/L (ref 3–16)
AST SERPL-CCNC: 35 U/L (ref 15–37)
BASOPHILS # BLD: 0.1 K/UL (ref 0–0.2)
BASOPHILS NFR BLD: 1 %
BILIRUB SERPL-MCNC: <0.2 MG/DL (ref 0–1)
BUN SERPL-MCNC: 27 MG/DL (ref 7–20)
CALCIUM SERPL-MCNC: 9.5 MG/DL (ref 8.3–10.6)
CHLORIDE SERPL-SCNC: 101 MMOL/L (ref 99–110)
CHOLEST SERPL-MCNC: 166 MG/DL (ref 0–199)
CO2 SERPL-SCNC: 26 MMOL/L (ref 21–32)
CREAT SERPL-MCNC: 0.8 MG/DL (ref 0.8–1.3)
DEPRECATED RDW RBC AUTO: 15.5 % (ref 12.4–15.4)
EOSINOPHIL # BLD: 0.1 K/UL (ref 0–0.6)
EOSINOPHIL NFR BLD: 1.6 %
GFR SERPLBLD CREATININE-BSD FMLA CKD-EPI: >90 ML/MIN/{1.73_M2}
GLUCOSE SERPL-MCNC: 90 MG/DL (ref 70–99)
HCT VFR BLD AUTO: 40.5 % (ref 40.5–52.5)
HDLC SERPL-MCNC: 61 MG/DL (ref 40–60)
HGB BLD-MCNC: 13.5 G/DL (ref 13.5–17.5)
LDLC SERPL CALC-MCNC: 82 MG/DL
LYMPHOCYTES # BLD: 1.4 K/UL (ref 1–5.1)
LYMPHOCYTES NFR BLD: 20.7 %
MCH RBC QN AUTO: 30.6 PG (ref 26–34)
MCHC RBC AUTO-ENTMCNC: 33.3 G/DL (ref 31–36)
MCV RBC AUTO: 92 FL (ref 80–100)
MONOCYTES # BLD: 0.7 K/UL (ref 0–1.3)
MONOCYTES NFR BLD: 11.1 %
NEUTROPHILS # BLD: 4.3 K/UL (ref 1.7–7.7)
NEUTROPHILS NFR BLD: 65.6 %
PLATELET # BLD AUTO: 325 K/UL (ref 135–450)
PMV BLD AUTO: 8.1 FL (ref 5–10.5)
POTASSIUM SERPL-SCNC: 4.4 MMOL/L (ref 3.5–5.1)
PROT SERPL-MCNC: 6.9 G/DL (ref 6.4–8.2)
PSA SERPL DL<=0.01 NG/ML-MCNC: 0.79 NG/ML (ref 0–4)
RBC # BLD AUTO: 4.41 M/UL (ref 4.2–5.9)
SODIUM SERPL-SCNC: 139 MMOL/L (ref 136–145)
TRIGL SERPL-MCNC: 117 MG/DL (ref 0–150)
TSH SERPL DL<=0.005 MIU/L-ACNC: 3.21 UIU/ML (ref 0.27–4.2)
VIT B12 SERPL-MCNC: 1078 PG/ML (ref 211–911)
VLDLC SERPL CALC-MCNC: 23 MG/DL
WBC # BLD AUTO: 6.6 K/UL (ref 4–11)

## 2024-06-29 RX ORDER — ARIPIPRAZOLE 2 MG/1
4 TABLET ORAL DAILY
COMMUNITY

## 2024-07-23 RX ORDER — OMEPRAZOLE 40 MG/1
40 CAPSULE, DELAYED RELEASE ORAL
Qty: 90 CAPSULE | Refills: 0 | Status: SHIPPED | OUTPATIENT
Start: 2024-07-23 | End: 2025-01-19

## 2024-08-06 RX ORDER — SILDENAFIL 100 MG/1
100 TABLET, FILM COATED ORAL DAILY PRN
Qty: 6 TABLET | Refills: 0 | Status: SHIPPED | OUTPATIENT
Start: 2024-08-06

## 2024-09-25 DIAGNOSIS — F51.04 CHRONIC INSOMNIA: ICD-10-CM

## 2024-09-25 RX ORDER — ZOLPIDEM TARTRATE 10 MG/1
10 TABLET ORAL NIGHTLY PRN
Qty: 30 TABLET | Refills: 2 | Status: SHIPPED | OUTPATIENT
Start: 2024-09-25 | End: 2024-12-24

## 2024-10-24 ENCOUNTER — TELEPHONE (OUTPATIENT)
Dept: FAMILY MEDICINE CLINIC | Age: 74
End: 2024-10-24

## 2024-10-24 NOTE — TELEPHONE ENCOUNTER
Areli [facility]un req refill for patient on  Omeprazole 40 mg caps  Last visit 6/27/24  No future  [id number]

## 2024-10-24 NOTE — TELEPHONE ENCOUNTER
Last Office Visit  -  6/27/24  Next Office Visit  -  n/a    Last Filled  -  7/23/24  Last UDS -    Contract -

## 2024-10-25 RX ORDER — OMEPRAZOLE 40 MG/1
40 CAPSULE, DELAYED RELEASE ORAL
Qty: 90 CAPSULE | Refills: 3 | Status: ON HOLD | OUTPATIENT
Start: 2024-10-25 | End: 2025-04-23

## 2024-11-03 ENCOUNTER — APPOINTMENT (OUTPATIENT)
Dept: CT IMAGING | Age: 74
End: 2024-11-03
Payer: MEDICARE

## 2024-11-03 ENCOUNTER — APPOINTMENT (OUTPATIENT)
Dept: MRI IMAGING | Age: 74
End: 2024-11-03
Payer: MEDICARE

## 2024-11-03 ENCOUNTER — HOSPITAL ENCOUNTER (OUTPATIENT)
Age: 74
Setting detail: OBSERVATION
Discharge: HOME | End: 2024-11-04
Attending: EMERGENCY MEDICINE | Admitting: STUDENT IN AN ORGANIZED HEALTH CARE EDUCATION/TRAINING PROGRAM
Payer: MEDICARE

## 2024-11-03 DIAGNOSIS — R41.3 AMNESIA: ICD-10-CM

## 2024-11-03 DIAGNOSIS — R27.0 ATAXIA: ICD-10-CM

## 2024-11-03 DIAGNOSIS — R41.0 TRANSIENT CONFUSION: Primary | ICD-10-CM

## 2024-11-03 PROBLEM — R29.90 STROKE-LIKE SYMPTOM: Status: ACTIVE | Noted: 2024-11-03

## 2024-11-03 LAB
ALBUMIN SERPL-MCNC: 4.4 G/DL (ref 3.4–5)
ALBUMIN/GLOB SERPL: 1.5 {RATIO} (ref 1.1–2.2)
ALP SERPL-CCNC: 74 U/L (ref 40–129)
ALT SERPL-CCNC: 25 U/L (ref 10–40)
ANION GAP SERPL CALCULATED.3IONS-SCNC: 13 MMOL/L (ref 3–16)
AST SERPL-CCNC: 39 U/L (ref 15–37)
BASOPHILS # BLD: 0.1 K/UL (ref 0–0.2)
BASOPHILS NFR BLD: 1.2 %
BILIRUB SERPL-MCNC: <0.2 MG/DL (ref 0–1)
BILIRUB UR QL STRIP.AUTO: NEGATIVE
BUN SERPL-MCNC: 17 MG/DL (ref 7–20)
CALCIUM SERPL-MCNC: 9.1 MG/DL (ref 8.3–10.6)
CHLORIDE SERPL-SCNC: 103 MMOL/L (ref 99–110)
CLARITY UR: CLEAR
CO2 SERPL-SCNC: 25 MMOL/L (ref 21–32)
COLOR UR: NORMAL
CREAT SERPL-MCNC: 0.8 MG/DL (ref 0.8–1.3)
DEPRECATED RDW RBC AUTO: 13.8 % (ref 12.4–15.4)
EOSINOPHIL # BLD: 0.1 K/UL (ref 0–0.6)
EOSINOPHIL NFR BLD: 2.1 %
GFR SERPLBLD CREATININE-BSD FMLA CKD-EPI: >90 ML/MIN/{1.73_M2}
GLUCOSE BLD-MCNC: 78 MG/DL (ref 70–99)
GLUCOSE SERPL-MCNC: 95 MG/DL (ref 70–99)
GLUCOSE UR STRIP.AUTO-MCNC: NEGATIVE MG/DL
HCT VFR BLD AUTO: 43.6 % (ref 40.5–52.5)
HGB BLD-MCNC: 14.3 G/DL (ref 13.5–17.5)
HGB UR QL STRIP.AUTO: NEGATIVE
INR PPP: 0.94 (ref 0.85–1.15)
KETONES UR STRIP.AUTO-MCNC: NEGATIVE MG/DL
LEUKOCYTE ESTERASE UR QL STRIP.AUTO: NEGATIVE
LITHIUM DOSE: ABNORMAL MG
LITHIUM SERPL-MCNC: 0.3 MMOL/L (ref 0.6–1.2)
LYMPHOCYTES # BLD: 1.2 K/UL (ref 1–5.1)
LYMPHOCYTES NFR BLD: 24.9 %
MCH RBC QN AUTO: 30.3 PG (ref 26–34)
MCHC RBC AUTO-ENTMCNC: 32.8 G/DL (ref 31–36)
MCV RBC AUTO: 92.4 FL (ref 80–100)
MONOCYTES # BLD: 0.5 K/UL (ref 0–1.3)
MONOCYTES NFR BLD: 11.3 %
NEUTROPHILS # BLD: 2.9 K/UL (ref 1.7–7.7)
NEUTROPHILS NFR BLD: 60.5 %
NITRITE UR QL STRIP.AUTO: NEGATIVE
PERFORMED ON: NORMAL
PH UR STRIP.AUTO: 6.5 [PH] (ref 5–8)
PLATELET # BLD AUTO: 307 K/UL (ref 135–450)
PLATELET BLD QL SMEAR: ADEQUATE
PMV BLD AUTO: 7.7 FL (ref 5–10.5)
POTASSIUM SERPL-SCNC: 4 MMOL/L (ref 3.5–5.1)
PROT SERPL-MCNC: 7.3 G/DL (ref 6.4–8.2)
PROT UR STRIP.AUTO-MCNC: NEGATIVE MG/DL
PROTHROMBIN TIME: 12.8 SEC (ref 11.9–14.9)
RBC # BLD AUTO: 4.72 M/UL (ref 4.2–5.9)
SLIDE REVIEW: NORMAL
SODIUM SERPL-SCNC: 141 MMOL/L (ref 136–145)
SP GR UR STRIP.AUTO: 1.01 (ref 1–1.03)
TROPONIN, HIGH SENSITIVITY: 9 NG/L (ref 0–22)
UA COMPLETE W REFLEX CULTURE PNL UR: NORMAL
UA DIPSTICK W REFLEX MICRO PNL UR: NORMAL
URN SPEC COLLECT METH UR: NORMAL
UROBILINOGEN UR STRIP-ACNC: 0.2 E.U./DL
WBC # BLD AUTO: 4.7 K/UL (ref 4–11)

## 2024-11-03 PROCEDURE — 99285 EMERGENCY DEPT VISIT HI MDM: CPT

## 2024-11-03 PROCEDURE — G0378 HOSPITAL OBSERVATION PER HR: HCPCS

## 2024-11-03 PROCEDURE — 6360000004 HC RX CONTRAST MEDICATION: Performed by: EMERGENCY MEDICINE

## 2024-11-03 PROCEDURE — 2580000003 HC RX 258: Performed by: STUDENT IN AN ORGANIZED HEALTH CARE EDUCATION/TRAINING PROGRAM

## 2024-11-03 PROCEDURE — 93005 ELECTROCARDIOGRAM TRACING: CPT | Performed by: EMERGENCY MEDICINE

## 2024-11-03 PROCEDURE — 80178 ASSAY OF LITHIUM: CPT

## 2024-11-03 PROCEDURE — 70551 MRI BRAIN STEM W/O DYE: CPT

## 2024-11-03 PROCEDURE — 6370000000 HC RX 637 (ALT 250 FOR IP): Performed by: NURSE PRACTITIONER

## 2024-11-03 PROCEDURE — 85025 COMPLETE CBC W/AUTO DIFF WBC: CPT

## 2024-11-03 PROCEDURE — 84484 ASSAY OF TROPONIN QUANT: CPT

## 2024-11-03 PROCEDURE — 70498 CT ANGIOGRAPHY NECK: CPT

## 2024-11-03 PROCEDURE — 85610 PROTHROMBIN TIME: CPT

## 2024-11-03 PROCEDURE — 6370000000 HC RX 637 (ALT 250 FOR IP): Performed by: EMERGENCY MEDICINE

## 2024-11-03 PROCEDURE — 81003 URINALYSIS AUTO W/O SCOPE: CPT

## 2024-11-03 PROCEDURE — 6370000000 HC RX 637 (ALT 250 FOR IP): Performed by: STUDENT IN AN ORGANIZED HEALTH CARE EDUCATION/TRAINING PROGRAM

## 2024-11-03 PROCEDURE — 80053 COMPREHEN METABOLIC PANEL: CPT

## 2024-11-03 PROCEDURE — 70450 CT HEAD/BRAIN W/O DYE: CPT

## 2024-11-03 RX ORDER — ASPIRIN 81 MG/1
324 TABLET, CHEWABLE ORAL ONCE
Status: COMPLETED | OUTPATIENT
Start: 2024-11-03 | End: 2024-11-03

## 2024-11-03 RX ORDER — ASPIRIN 81 MG/1
81 TABLET, CHEWABLE ORAL DAILY
Status: DISCONTINUED | OUTPATIENT
Start: 2024-11-04 | End: 2024-11-04 | Stop reason: HOSPADM

## 2024-11-03 RX ORDER — SODIUM CHLORIDE 0.9 % (FLUSH) 0.9 %
5-40 SYRINGE (ML) INJECTION PRN
Status: DISCONTINUED | OUTPATIENT
Start: 2024-11-03 | End: 2024-11-04 | Stop reason: HOSPADM

## 2024-11-03 RX ORDER — VENLAFAXINE HYDROCHLORIDE 37.5 MG/1
75 CAPSULE, EXTENDED RELEASE ORAL DAILY
Status: DISCONTINUED | OUTPATIENT
Start: 2024-11-04 | End: 2024-11-04 | Stop reason: HOSPADM

## 2024-11-03 RX ORDER — PANTOPRAZOLE SODIUM 40 MG/1
40 TABLET, DELAYED RELEASE ORAL
Status: DISCONTINUED | OUTPATIENT
Start: 2024-11-04 | End: 2024-11-04 | Stop reason: HOSPADM

## 2024-11-03 RX ORDER — SODIUM CHLORIDE 0.9 % (FLUSH) 0.9 %
5-40 SYRINGE (ML) INJECTION EVERY 12 HOURS SCHEDULED
Status: DISCONTINUED | OUTPATIENT
Start: 2024-11-03 | End: 2024-11-04 | Stop reason: HOSPADM

## 2024-11-03 RX ORDER — ASPIRIN 300 MG/1
300 SUPPOSITORY RECTAL DAILY
Status: DISCONTINUED | OUTPATIENT
Start: 2024-11-04 | End: 2024-11-04 | Stop reason: HOSPADM

## 2024-11-03 RX ORDER — ENOXAPARIN SODIUM 100 MG/ML
40 INJECTION SUBCUTANEOUS DAILY
Status: DISCONTINUED | OUTPATIENT
Start: 2024-11-04 | End: 2024-11-04 | Stop reason: HOSPADM

## 2024-11-03 RX ORDER — ONDANSETRON 4 MG/1
4 TABLET, ORALLY DISINTEGRATING ORAL EVERY 8 HOURS PRN
Status: DISCONTINUED | OUTPATIENT
Start: 2024-11-03 | End: 2024-11-04 | Stop reason: HOSPADM

## 2024-11-03 RX ORDER — POLYETHYLENE GLYCOL 3350 17 G/17G
17 POWDER, FOR SOLUTION ORAL DAILY PRN
Status: DISCONTINUED | OUTPATIENT
Start: 2024-11-03 | End: 2024-11-04 | Stop reason: HOSPADM

## 2024-11-03 RX ORDER — SODIUM CHLORIDE 9 MG/ML
INJECTION, SOLUTION INTRAVENOUS PRN
Status: DISCONTINUED | OUTPATIENT
Start: 2024-11-03 | End: 2024-11-04 | Stop reason: HOSPADM

## 2024-11-03 RX ORDER — LITHIUM CARBONATE 150 MG/1
300 CAPSULE ORAL EVERY EVENING
Status: DISCONTINUED | OUTPATIENT
Start: 2024-11-03 | End: 2024-11-04 | Stop reason: HOSPADM

## 2024-11-03 RX ORDER — ROSUVASTATIN CALCIUM 10 MG/1
40 TABLET, COATED ORAL NIGHTLY
Status: DISCONTINUED | OUTPATIENT
Start: 2024-11-03 | End: 2024-11-04 | Stop reason: HOSPADM

## 2024-11-03 RX ORDER — ZOLPIDEM TARTRATE 5 MG/1
5 TABLET ORAL NIGHTLY PRN
Status: DISCONTINUED | OUTPATIENT
Start: 2024-11-03 | End: 2024-11-04

## 2024-11-03 RX ORDER — ONDANSETRON 2 MG/ML
4 INJECTION INTRAMUSCULAR; INTRAVENOUS EVERY 6 HOURS PRN
Status: DISCONTINUED | OUTPATIENT
Start: 2024-11-03 | End: 2024-11-04 | Stop reason: HOSPADM

## 2024-11-03 RX ORDER — ARIPIPRAZOLE 2 MG/1
4 TABLET ORAL DAILY
Status: DISCONTINUED | OUTPATIENT
Start: 2024-11-04 | End: 2024-11-04 | Stop reason: HOSPADM

## 2024-11-03 RX ORDER — IOPAMIDOL 755 MG/ML
75 INJECTION, SOLUTION INTRAVASCULAR
Status: COMPLETED | OUTPATIENT
Start: 2024-11-03 | End: 2024-11-03

## 2024-11-03 RX ADMIN — ASPIRIN 324 MG: 81 TABLET, CHEWABLE ORAL at 15:06

## 2024-11-03 RX ADMIN — Medication 10 ML: at 19:55

## 2024-11-03 RX ADMIN — ZOLPIDEM TARTRATE 5 MG: 5 TABLET ORAL at 20:51

## 2024-11-03 RX ADMIN — ROSUVASTATIN CALCIUM 40 MG: 10 TABLET, FILM COATED ORAL at 19:55

## 2024-11-03 RX ADMIN — IOPAMIDOL 75 ML: 755 INJECTION, SOLUTION INTRAVENOUS at 13:39

## 2024-11-03 ASSESSMENT — PAIN SCALES - GENERAL: PAINLEVEL_OUTOF10: 0

## 2024-11-03 ASSESSMENT — LIFESTYLE VARIABLES
HOW MANY STANDARD DRINKS CONTAINING ALCOHOL DO YOU HAVE ON A TYPICAL DAY: PATIENT DOES NOT DRINK
HOW OFTEN DO YOU HAVE A DRINK CONTAINING ALCOHOL: NEVER

## 2024-11-03 ASSESSMENT — PAIN - FUNCTIONAL ASSESSMENT: PAIN_FUNCTIONAL_ASSESSMENT: NONE - DENIES PAIN

## 2024-11-03 NOTE — PLAN OF CARE
Stroke Team    Called for confusion. Son recently passed away. LKW last night when went to bed. Then this morning has been off since woke up - repeating the same question over and over, fell asleep in the car. Maybe some gait difficulty but it is subtle. Per ED exam, NIHSS 0 and he is oriented. Able to ambulate on own. CTH without acute blood. CTA without occlusion on my read. Did not administer TNK given outside time window and did not pursue WAKe-UP MRI as exam is normal for ED and able to ambulate on own.    Differential includes ischemic stroke vs TGA vs other etiologies.     Recommend:  - No acute stroke treatments  - Local neurology consult    Paul Wechsler, MD   Stroke Team

## 2024-11-03 NOTE — PLAN OF CARE
Admission Neuro Deficit Status: AMS                Patient Stated Goal:        Reviewed the Following Education with Patient:    Patient's Risk Factors of Stroke: Cholesterol          Signs and Symptoms of Stroke - Reviewed FAST  Facial Drooping  Arm Weakness  Speech Difficulty  Time to Call 911    How to Activate Emergency Medical Services (911)    Importance of Follow Up Appointment at Discharge    Importance of Compliance with Medications Prescribed at Discharge            Stroke Education Booklet at Bedside: Yes

## 2024-11-03 NOTE — H&P
V2.0  History and Physical      Name:  Neftaly Horne /Age/Sex: 1950  (74 y.o. male)   MRN & CSN:  [id number] & [id number] Encounter Date/Time: 11/3/2024 3:38 PM EST   Location:   PCP: Tati Martin MD       Hospital Day: 1    History from:     patient    History of Present Illness:     Chief Complaint: Stroke-like symptom    Neftaly Horne is a 74 y.o. male with pmh of bipolar, insomnia, and GERD who presents with amnesia and gait instability from home this morning.  Patient reportedly was doses help last night and woke up and was repeating himself and appeared to have difficulty remembering.  The patient's wife who is at bedside stated that this does happen from time to time, but this time the symptoms lasted longer or do not resolve.  Patient also did have gait instability.  Once patient arrived to the ER symptoms did improve.  Stroke team was consulted given that the patient's last known well was last evening he was not a TNK candidate.  Of note, patient has been under a great deal of stress as his son passed a few days prior to presentation. Patient does take Lithium and reports compliance with his mediation. Patient is mildly hypertensive on arrival and bradycardic    Assessment and Plan:   Neftaly Horne is a 74 y.o. male  who presents with Stroke-like symptom    Stroke Like symptoms  Amnesia and gait instability.  CT and CTA ok  Neurology consulted  F/U MRI brain  ASA and stain  Allow BP to autoregulate (not on BP meds at home)  Will check lithium level to ensure symptoms are not toxicity    Bradycardia.  Sinus rhythm. Not on AV hung blocking agents  Monitor on Telemetry  Checking lithium level to ensure no toxicity    Bipolar  Hold lithium  Continue abilify    Hyperlipidemia  Has had an elevated in the past >100. No on statin. Last LDL 82 on   Statin  Check lipid panel    Disposition:   Current Living situation: home  Expected Disposition: home  Estimated D/C: 1-2 days    Diet Diet NPO

## 2024-11-03 NOTE — ED NOTES
Neftaly Horne is a 74 y.o. male admitted for  Principal Problem:    Stroke-like symptom  Resolved Problems:    * No resolved hospital problems. *  .   Patient Home via family with   Chief Complaint   Patient presents with    Altered Mental Status     Wife reports patient went to sleep like normal at 11pm, woke up at 8am, and had some slight confusion about what day it was. Reports going to  daughter from [employer] and got home and did not remember picking her up. When patient got home, he feel asleep in the car, wife reports he almost hit the garage door. Patient does not remember picking up her daughter. Patient reports dizziness and double vision.    .  Patient is alert and Person, Place, Time, and Situation  Patient's baseline mobility: Baseline Mobility: Independent   Code Status: No Order   Cardiac Rhythm:       Is patient on baseline Oxygen: no how many Liters:   Abnormal Assessment Findings:     Isolation:       NIH Score:    C-SSRS: Risk of Suicide: No Risk  Bedside swallow:        Active LDA's:   Peripheral IV 11/03/24 Left Antecubital (Active)   Site Assessment Clean, dry & intact 11/03/24 1334   Line Status Blood return noted 11/03/24 1334   Line Care Connections checked and tightened 11/03/24 1334   Phlebitis Assessment No symptoms 11/03/24 1334   Infiltration Assessment 0 11/03/24 1334   Dressing Status New dressing applied 11/03/24 1334   Dressing Type Transparent 11/03/24 1334   Dressing Intervention New 11/03/24 1334     Patient admitted with a vela:  If the vela is chronic was it exchanged:  Reason for vela:   Patient admitted with Central Line:  . PICC line placement confirmed: YES OR NO:[id number]}   Reason for Central line:   Was central line Inserted from an outside facility:        Family/Caregiver Present yes Any Concerns: no   Restraints no  Sitter no         Vitals:      Vitals:    11/03/24 1416 11/03/24 1446 11/03/24 1501 11/03/24 1516   BP: (!) 157/80 (!) 153/74 (!) 150/79 (!) 145/75

## 2024-11-03 NOTE — ED NOTES
Code Stroke activated @ 1316  CT notified of Code Stroke @ 1316   Stroke Team paged @ 1312  Lab notified of Code Stroke @ 1318  Paul Wechsler with  Stroke Team returned call and spoke with Dr. Chase @ [phone]

## 2024-11-04 VITALS
RESPIRATION RATE: 15 BRPM | HEART RATE: 64 BPM | WEIGHT: 153.2 LBS | TEMPERATURE: 97.8 F | BODY MASS INDEX: 21.45 KG/M2 | DIASTOLIC BLOOD PRESSURE: 68 MMHG | SYSTOLIC BLOOD PRESSURE: 111 MMHG | HEIGHT: 71 IN | OXYGEN SATURATION: 94 %

## 2024-11-04 PROBLEM — R41.0 TRANSIENT CONFUSION: Status: ACTIVE | Noted: 2024-11-04

## 2024-11-04 LAB
ANION GAP SERPL CALCULATED.3IONS-SCNC: 9 MMOL/L (ref 3–16)
BUN SERPL-MCNC: 16 MG/DL (ref 7–20)
CALCIUM SERPL-MCNC: 8.4 MG/DL (ref 8.3–10.6)
CHLORIDE SERPL-SCNC: 106 MMOL/L (ref 99–110)
CO2 SERPL-SCNC: 24 MMOL/L (ref 21–32)
CREAT SERPL-MCNC: 0.8 MG/DL (ref 0.8–1.3)
DEPRECATED RDW RBC AUTO: 13.5 % (ref 12.4–15.4)
EKG ATRIAL RATE: 53 BPM
EKG DIAGNOSIS: NORMAL
EKG P AXIS: 69 DEGREES
EKG P-R INTERVAL: 150 MS
EKG Q-T INTERVAL: 454 MS
EKG QRS DURATION: 102 MS
EKG QTC CALCULATION (BAZETT): 426 MS
EKG R AXIS: 80 DEGREES
EKG T AXIS: 67 DEGREES
EKG VENTRICULAR RATE: 53 BPM
EST. AVERAGE GLUCOSE BLD GHB EST-MCNC: 114 MG/DL
GFR SERPLBLD CREATININE-BSD FMLA CKD-EPI: >90 ML/MIN/{1.73_M2}
GLUCOSE SERPL-MCNC: 97 MG/DL (ref 70–99)
HBA1C MFR BLD: 5.6 %
HCT VFR BLD AUTO: 39.4 % (ref 40.5–52.5)
HGB BLD-MCNC: 13.1 G/DL (ref 13.5–17.5)
MCH RBC QN AUTO: 31.1 PG (ref 26–34)
MCHC RBC AUTO-ENTMCNC: 33.4 G/DL (ref 31–36)
MCV RBC AUTO: 93.1 FL (ref 80–100)
PLATELET # BLD AUTO: 303 K/UL (ref 135–450)
PMV BLD AUTO: 7.4 FL (ref 5–10.5)
POTASSIUM SERPL-SCNC: 4 MMOL/L (ref 3.5–5.1)
RBC # BLD AUTO: 4.23 M/UL (ref 4.2–5.9)
SODIUM SERPL-SCNC: 139 MMOL/L (ref 136–145)
WBC # BLD AUTO: 4.9 K/UL (ref 4–11)

## 2024-11-04 PROCEDURE — G0378 HOSPITAL OBSERVATION PER HR: HCPCS

## 2024-11-04 PROCEDURE — 97161 PT EVAL LOW COMPLEX 20 MIN: CPT

## 2024-11-04 PROCEDURE — 96372 THER/PROPH/DIAG INJ SC/IM: CPT

## 2024-11-04 PROCEDURE — 97116 GAIT TRAINING THERAPY: CPT

## 2024-11-04 PROCEDURE — 6360000002 HC RX W HCPCS: Performed by: STUDENT IN AN ORGANIZED HEALTH CARE EDUCATION/TRAINING PROGRAM

## 2024-11-04 PROCEDURE — 97530 THERAPEUTIC ACTIVITIES: CPT

## 2024-11-04 PROCEDURE — 6370000000 HC RX 637 (ALT 250 FOR IP): Performed by: STUDENT IN AN ORGANIZED HEALTH CARE EDUCATION/TRAINING PROGRAM

## 2024-11-04 PROCEDURE — 93010 ELECTROCARDIOGRAM REPORT: CPT | Performed by: INTERNAL MEDICINE

## 2024-11-04 PROCEDURE — 85027 COMPLETE CBC AUTOMATED: CPT

## 2024-11-04 PROCEDURE — APPSS45 APP SPLIT SHARED TIME 31-45 MINUTES

## 2024-11-04 PROCEDURE — 83036 HEMOGLOBIN GLYCOSYLATED A1C: CPT

## 2024-11-04 PROCEDURE — 80048 BASIC METABOLIC PNL TOTAL CA: CPT

## 2024-11-04 PROCEDURE — 2580000003 HC RX 258: Performed by: STUDENT IN AN ORGANIZED HEALTH CARE EDUCATION/TRAINING PROGRAM

## 2024-11-04 PROCEDURE — 80061 LIPID PANEL: CPT

## 2024-11-04 PROCEDURE — 99222 1ST HOSP IP/OBS MODERATE 55: CPT | Performed by: STUDENT IN AN ORGANIZED HEALTH CARE EDUCATION/TRAINING PROGRAM

## 2024-11-04 PROCEDURE — 36415 COLL VENOUS BLD VENIPUNCTURE: CPT

## 2024-11-04 RX ADMIN — PANTOPRAZOLE SODIUM 40 MG: 40 TABLET, DELAYED RELEASE ORAL at 06:48

## 2024-11-04 RX ADMIN — ARIPIPRAZOLE 4 MG: 2 TABLET ORAL at 09:28

## 2024-11-04 RX ADMIN — ENOXAPARIN SODIUM 40 MG: 100 INJECTION SUBCUTANEOUS at 09:27

## 2024-11-04 RX ADMIN — VENLAFAXINE HYDROCHLORIDE 75 MG: 37.5 CAPSULE, EXTENDED RELEASE ORAL at 09:27

## 2024-11-04 RX ADMIN — Medication 10 ML: at 09:32

## 2024-11-04 RX ADMIN — ASPIRIN 81 MG: 81 TABLET, CHEWABLE ORAL at 09:28

## 2024-11-04 NOTE — DISCHARGE INSTRUCTIONS
safer than electric stoves. Electric stoves are safer than gas stoves. Cook on the back burner of the stove. Set the maximum water temperature to 110 degrees Fahrenheit. Put guards on open fireplaces, wood stoves, and radiators. Don't smoke or use matches when alone.    Climbing Heights: Avoid ladders and unprotected heights.    Equipment and Power Tools: Use safety guards for equipment used for cutting, chopping, and drilling. Make sure all equipment has automatic stop switches.    Other restrictions and/or modifications may be necessary depending on your occupation and hobbies.    Learn more about safety precautions for epilepsy at this website: <https://www.epilepsy.com/preparedness-safety/staying-safe>.    EPILEPSY RESOURCES  You can find many tools, resources, educational material, research updates, and local epilepsy support groups at this website: <https://www.epilepsy.com/>.

## 2024-11-04 NOTE — ED PROVIDER NOTES
[facility] B3 - MED SURG     EMERGENCY DEPARTMENT ENCOUNTER            Pt Name: Neftaly Horne   MRN: [id number]   Birthdate 1950   Date of evaluation: 11/3/2024   Provider: Stephan Chase II, DO   PCP: Tati Martin MD   Note Started: 12:10 AM EST 11/4/24          CHIEF COMPLAINT     Chief Complaint   Patient presents with    Altered Mental Status     Wife reports patient went to sleep like normal at 11pm, woke up at 8am, and had some slight confusion about what day it was. Reports going to  daughter from [employer] and got home and did not remember picking her up. When patient got home, he feel asleep in the car, wife reports he almost hit the garage door. Patient does not remember picking up her daughter. Patient reports dizziness and double vision.              HISTORY OF PRESENT ILLNESS:   History from : Patient and Family      Limitations to history : None     Neftaly Horne is a 74 y.o. male who presents to the emergency department with reported altered mental status.  Patient reportedly was last seen normal at approximately 11 PM.  When he woke up at 8 AM patient appeared to be somewhat confused.  Wife states that their daughter had requested that patient pick her up and that he was shortly thereafter found sleeping in the car and had several episodes where he could not remember what he was doing or whether or not he had picked up their daughter.  Mild dizziness/double vision reported during this time.  Symptoms appear to have improved though patient continues to be unable to recall details of 1 to 2 hours this morning.    Nursing Notes were all reviewed and agreed with, or any disagreements were addressed in the HPI.     REVIEW OF SYSTEMS :    Positives and Pertinent negatives as per HPI.      MEDICAL HISTORY   has a past medical history of Acid reflux, Sanchez esophagus (2019), Bipolar 2 disorder, major depressive episode (HCC) (2012), BPH (benign prostatic hyperplasia), Depression (1980),

## 2024-11-04 NOTE — PROGRESS NOTES
4 Eyes Skin Assessment and Patient belongings     The patient is being assess for  Admission    I agree that 2 Nurses have performed a thorough Head to Toe Skin Assessment on the patient. ALL assessment sites listed below have been assessed.       Areas assessed by both nurses:   [x]   Head, Face, and Ears   [x]   Shoulders, Back, and Chest  [x]   Arms, Elbows, and Hands   [x]   Coccyx, Sacrum, and IschIum  [x]   Legs, Feet, and Heels        Does the Patient have Skin Breakdown?  No         Giacomo Prevention initiated:  No   Wound Care Orders initiated:  No      [facility] nurse consulted for Pressure Injury (Stage 3,4, Unstageable, DTI, NWPT, and Complex wounds), New and Established Ostomies:  No      I agree that 2 Nurses have reviewed patient belongings with the patient/family and documented in the flowsheet upon admission or transfer to the unit.     Belongings  Dental Appliances: None  Vision - Corrective Lenses: None  Hearing Aid: None  Clothing: Footwear, Pants, Shirt, Socks, Undergarments  Jewelry: Ring, Watch  Body Piercings Removed: No  Electronic Devices: Cell Phone  Weapons (Notify Protective Services/Security): None  Other Valuables: At home  Home Medications: None  Valuables Given To: Patient       Nurse 1 eSignature: Electronically signed by Tracie Renee RN on 11/3/24 at 4:38 PM EST        Nurse 2 eSignature: Electronically signed by Tootie Kapadia RN on 11/3/24 at 4:39 PM EST    
Comprehensive Nutrition Assessment    Type and Reason for Visit:  Initial, Positive nutrition screen    Nutrition Recommendations/Plan:   Continue regular diet  Encourage po intakes  Monitor need for ONS  Monitor po intakes, nutrition adequacy, weights, pertinent labs, BMs     Malnutrition Assessment:  Malnutrition Status:  At risk for malnutrition (Needs NFPE) (11/04/24 5531)      Nutrition Assessment:    Positive nutrition screen for weight loss and decreased appetite. Pt with pmh of bipolar, insomnia, and GERD who presents with amnesia and gait instability. Currently on a regular diet. Pt sleeping soundly at time of visit, spoke with wife at BS. Wife reports that pt's appetite has been decreased over the past month, but has been especially poor over the past week d/t the loss of their son last week. CHASITY weight changes d/t lack of actual weight HX in EMR. Wife denied having any nutrition questions or needs. Will monitor.    Nutrition Related Findings:    Labs reviewed. No BM. Wound Type: None       Current Nutrition Intake & Therapies:    Average Meal Intake: 1-25%, %  Average Supplements Intake: None Ordered  ADULT DIET; Regular    Anthropometric Measures:  Height: 180.3 cm (5' 11\")  Ideal Body Weight (IBW): 172 lbs (78 kg)       Current Body Weight: 69.5 kg (153 lb 3.2 oz),   IBW. Weight Source: Standing scale  Current BMI (kg/m2): 21.4                             BMI Categories: Underweight (BMI less than 22) age over 65    Estimated Daily Nutrient Needs:  Energy Requirements Based On: Kcal/kg  Weight Used for Energy Requirements: Current  Energy (kcal/day): 2262-9372  Weight Used for Protein Requirements: Current  Protein (g/day): 70-83  Method Used for Fluid Requirements: 1 ml/kcal  Fluid (ml/day): 7260-9939    Nutrition Diagnosis:   Inadequate oral intake related to psychological cause or life stress as evidenced by poor intake prior to admission    Nutrition Interventions:   Food and/or Nutrient 
Occupational Therapy  Per PT, following PT evaluation, Pt has no needs/concerns as they relate to occupational therapy. Pt is independent with ADLs. Will sign-off with no charge.  Thank you    Aleida Maria, OTR/L  
Patient discharged to home. IV removed with no complications. Discharge education complete with verbal understanding. All belongings sent home with patient. Patient transported via personal vehicle by spouse.   
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)  Leisure & Hobbies: Listening to music, engaging in politics, riding bikes, writing    Vision/Hearing  Vision  Vision: Impaired  Vision Exceptions: Wears glasses for reading  Hearing  Hearing: Exceptions to WFL  Hearing Exceptions: Hard of hearing/hearing concerns      Cognition   Orientation  Overall Orientation Status: Within Normal Limits    Objective  Vitals  Temp: 98.3 °F (36.8 °C)  Pulse: 68  Heart Rate Source: Monitor  Respirations: 17  SpO2: 95 %  O2 Device: None (Room air)  BP: (!) 143/73  MAP (Calculated): 96  BP Location: Left upper arm  BP Method: Automatic  Patient Position: High fowlers    Observation/Palpation  Posture: Good    Gross Assessment  AROM: Within functional limits  PROM: Within functional limits  Strength: Within functional limits  Coordination: Within functional limits  Tone: Normal  Sensation: Intact (no c/o N/T in BUE/BLE)    Bed Mobility Training  Supine to Sit: Independent  Sit to Supine: Independent  Scooting: Independent    Balance  Sitting: Intact  Standing: Intact    Transfer Training  Sit to Stand: Independent  Stand to Sit: Independent  Bed to Chair: Independent (without AD)    Gait Training  Overall Level of Assistance: Independent  Distance (ft): 300 Feet  Assistive Device: None  Speed/Caroline: Accelerated  Swing Pattern: Left symmetrical;Right symmetrical  Gait Abnormalities:  (normal gait speed, good standing balance, no deviations noted)    Stair Training  Rail Use: None  Stairs - Level of Assistance: Independent  Number of Stairs Trained: 5 (two 6\" steps + three 4\" steps)    AM-PAC - Mobility    AM-PAC Basic Mobility - Inpatient   How much help is needed turning from your back to your side while in a flat bed without using bedrails?: None  How much help is needed moving from lying on your back to sitting on the side of a flat bed without using bedrails?: None  How much help is 
Labs     11/03/24  1335 11/04/24  0635   WBC 4.7 4.9   HGB 14.3 13.1*   HCT 43.6 39.4*    303     Recent Labs     11/03/24  1335 11/04/24  0635    139   K 4.0 4.0    106   CO2 25 24   BUN 17 16   CREATININE 0.8 0.8   CALCIUM 9.1 8.4     Recent Labs     11/03/24  1335   TROPHS 9     No results for input(s): \"LABA1C\" in the last 72 hours.  Recent Labs     11/03/24  1335   AST 39*   ALT 25   BILITOT <0.2   ALKPHOS 74     Recent Labs     11/03/24  1335   INR 0.94       Urine Cultures:   Lab Results   Component Value Date/Time    LABURIN No growth at 18-36 hours 11/25/2019 10:39 AM     Blood Cultures: No results found for: \"BC\"  No results found for: \"BLOODCULT2\"  Organism: No results found for: \"ORG\"      Mumtaz Davis MD

## 2024-11-04 NOTE — CONSULTS
In patient Neurology consult        [facility] Neurology            [facility]  1950    Date of Service: 11/4/2024    Referring Physician: Mumtaz Davis MD      Reason for the consult and CC: Amnesia    HPI:   The patient is a 74 y.o.  years old male with a prior medical history of bipolar 2, BPH, vitamin D deficiency who presented to the hospital with altered mental status.  Patient was last known well 11/2 before bed.  Per wife, patient fell asleep on couch which is not unusual for him, and woke up the next morning around 9 AM which is later than usual.  Wife states patient seemed more tired than usual and asked him to go  their daughter from [employer].  Wife states that 20 minutes later she found him asleep in the car.  Patient woke up and picked up his daughter and states that he was having some diplopia during the drive.  When he got home patient forgot that he had driven to the [employer].  Patient states that his gait also felt mildly unstable, however he has been having ongoing balance issues for quite some time and has gone to the balance center for this.  Wife states that patient did not seem himself until today.  Patient has minimal recollection of the events yesterday.  Wife states that patient has had other more mild forgetful episodes over the last several months.  She states there has been times where he will think he has a class however it is the weekend.  Also states he has been sleeping more than usual over the last few months and frequently napping.  Of note, patient's son passed away last week.  Patient takes Ambien at home about 6 times per week for at least 5 years and did take one on 11/2 before bed. Pt has a history of mild RADHA but does not use CPAP at baseline. Patient denies any changes to his other medications or missing doses. Denies alcohol or recreational drug use. CT head, CTA revealed no acute findings. MRI brain without contrast revealed no acute findings.

## 2024-11-04 NOTE — CARE COORDINATION
Case Management Assessment  Initial Evaluation    Date/Time of Evaluation: 11/4/2024 10:34 AM  Assessment Completed by: Svitlana Hopper RN    If patient is discharged prior to next notation, then this note serves as note for discharge by case management.    Patient Name: Neftaly Horne                   YOB: 1950  Diagnosis: Ataxia [R27.0]  Stroke-like symptom [R29.90]  Anemia, unspecified type [D64.9]                   Date / Time: 11/3/2024  1:03 PM    Patient Admission Status: Observation   Readmission Risk (Low < 19, Mod (19-27), High > 27): No data recorded  Current PCP: Tati Martin MD  PCP verified by ? Yes    Chart Reviewed: Yes      History Provided by: Patient  Patient Orientation: Alert and Oriented, Person, Place, Situation    Patient Cognition: Alert    Hospitalization in the last 30 days (Readmission):  No    If yes, Readmission Assessment in  Navigator will be completed.    Advance Directives:      Code Status: Full Code   Patient's Primary Decision Maker is: Legal Next of Kin    Primary Decision Maker: Kinjal Horne - Spouse - [phone]    Discharge Planning:    Patient lives with: Spouse/Significant Other Type of Home: Other (Comment) (condo)  Primary Care Giver: Self  Patient Support Systems include: Spouse/Significant Other   Current Financial resources: Medicare  Current community resources: None  Current services prior to admission: None            Current DME:              Type of Home Care services:  None    ADLS  Prior functional level: Independent in ADLs/IADLs  Current functional level: Independent in ADLs/IADLs    PT AM-PAC: 24 /24  OT AM-PAC:   /24    Family can provide assistance at DC: Yes  Would you like Case Management to discuss the discharge plan with any other family members/significant others, and if so, who? Yes (wife)  Plans to Return to Present Housing: Yes  Other Identified Issues/Barriers to RETURNING to current housing:   Potential Assistance

## 2024-11-04 NOTE — PLAN OF CARE
TODAY'S DATE:  11/4/2024      Current NIHSS 0      Discussed personal risk factors for Stroke/TIA with patient/family, and ways to reduce the risk for a recurrent stroke.     Patient's personal risk factors which were identified are:   []   Alcohol Abuse: check with your physician before any alcohol consumption.   []   Atrial fibrillation: may cause blood clots  []   Drug Abuse: Seek help, talk with your doctor  []   Clotting Disorder  []   Diabetes  []   Family history of stroke or heart disease  []   High Blood Pressure/Hypertension: work with your physician  []   High cholesterol: monitor cholesterol levels with your physician  []   Overweight/Obesity: work with your physician for your ideal body weight  []   Physical Inactivity: get regular exercise as directed by your physician  []   Personal history of previous TIA or stroke  []   Poor Diet: decrease salt (sodium) in your diet, follow diet directed by physician  []   Smoking: stop smoking      Reviewed the Following Education with Patient and/or Family:   - Signs and Symptoms of Stroke  - Personal risk factors   - How to activate EMS (911)   - Importance of Follow Up Appointments at Discharge   - Importance of Compliance with Medications Prescribed at Discharge  - Available community resources and stroke advocacy groups if needed    Patient and/or family member: verbalized understanding.     Stroke Education booklet given to patient/family (or verified, if given already), which reviews above information. Not available at this time         Electronically signed by Zan Estes RN on 11/4/2024 at 12:39 AM       Problem: Discharge Planning  Goal: Discharge to home or other facility with appropriate resources  Outcome: Progressing    Problem: Safety - Adult  Goal: Free from fall injury  Outcome: Progressing     Problem: Neurosensory - Adult  Goal: Achieves stable or improved neurological status  Outcome: Progressing  Goal: Achieves maximal functionality and self

## 2024-11-04 NOTE — CONSULTS
Consult Placed Perfect Serve    Who:  Alicia Newton  Date:11/4/2024  Time:0750     Electronically signed by Samantha Kramer RN on 11/4/2024 at 7:50 AM

## 2024-11-04 NOTE — ACP (ADVANCE CARE PLANNING)
Advance Care Planning   General Advance Care Planning (ACP) Conversation    Date of Conversation: 11/3/2024  Conducted with: Patient with Decision Making Capacity  Other persons present: Spouse Kinjal    Healthcare Decision Maker:    Primary Decision Maker: Kinjal Horne - Spouse - [phone]    Today we documented Decision Maker(s) consistent with Legal Next of Kin hierarchy.  Content/Action Overview:  Has NO ACP documents-Information provided  Reviewed DNR/DNI and patient elects Full Code (Attempt Resuscitation)      Length of Voluntary ACP Conversation in minutes:  <16 minutes (Non-Billable)    Svitlana Hopper RN

## 2024-11-04 NOTE — PLAN OF CARE
Problem: Discharge Planning  Goal: Discharge to home or other facility with appropriate resources  Outcome: Completed  Flowsheets (Taken 11/4/2024 1047)  Discharge to home or other facility with appropriate resources: Identify barriers to discharge with patient and caregiver     Problem: Safety - Adult  Goal: Free from fall injury  Outcome: Completed     Problem: Neurosensory - Adult  Goal: Achieves stable or improved neurological status  Outcome: Completed  Flowsheets (Taken 11/4/2024 1047)  Achieves stable or improved neurological status: Assess for and report changes in neurological status  Goal: Achieves maximal functionality and self care  Outcome: Completed  Flowsheets (Taken 11/4/2024 1047)  Achieves maximal functionality and self care: Monitor swallowing and airway patency with patient fatigue and changes in neurological status     Problem: Nutrition Deficit:  Goal: Optimize nutritional status  Outcome: Completed

## 2024-11-05 ENCOUNTER — TELEPHONE (OUTPATIENT)
Dept: NEUROLOGY | Age: 74
End: 2024-11-05

## 2024-11-05 LAB
CHOLEST SERPL-MCNC: 142 MG/DL (ref 0–199)
HDLC SERPL-MCNC: 55 MG/DL (ref 40–60)
LDLC SERPL CALC-MCNC: 73 MG/DL
TRIGL SERPL-MCNC: 69 MG/DL (ref 0–150)
VLDLC SERPL CALC-MCNC: 14 MG/DL

## 2024-11-05 NOTE — DISCHARGE SUMMARY
Hospital Medicine Discharge Summary    Patient: Neftaly Horne   : 1950     Admit Date: 11/3/2024   Discharge Date: 2024    Disposition:  [x]Home   []HHC  []SNF  []Acute Rehab  []LTAC  []Hospice  Code status:  [x]Full  []DNR/CCA  []Limited (DNR/CCA with Do Not Intubate)  []DNRCC  Condition at Discharge: Stable  Primary Care Provider: Tati Martin MD    Admitting Provider: Brannon Santoro MD  Discharge Provider: Mumtaz Davis MD     Discharge Diagnoses:      Active Hospital Problems    Diagnosis     Transient confusion [R41.0]     Stroke-like symptom [R29.90]        Presenting Admission History:        \"Neftaly Horne is a 74 y.o. male with pmh of bipolar, insomnia, and GERD who presents with amnesia and gait instability from home this morning.  Patient reportedly was doses help last night and woke up and was repeating himself and appeared to have difficulty remembering.  The patient's wife who is at bedside stated that this does happen from time to time, but this time the symptoms lasted longer or do not resolve.  Patient also did have gait instability.  Once patient arrived to the ER symptoms did improve.  Stroke team was consulted given that the patient's last known well was last evening he was not a TNK candidate.  Of note, patient has been under a great deal of stress as his son passed a few days prior to presentation. Patient does take Lithium and reports compliance with his mediation. Patient is mildly hypertensive on arrival and bradycardic\"      Assessment/Plan:             Amnesia and gait instability.  CT and CTA negative.  Neurology consulted and MRI ordered - negative for acute infarct - CVA ruled out, w/ EEG pending.      Bradycardia - self-limited and not sustained on Tele.      Bipolar Disorder - Initially held Lithium - resumed at discharge.  Continue abilify     HyperLipidemia - normally controlled on home Statin. Continued.  Follow up / PCP outpatient for medication

## 2024-11-05 NOTE — TELEPHONE ENCOUNTER
Patient is scheduled for EEG and hospital follow up.     Patient wanted to let you know he took Ambien 10 mg by mistake the day before he went to ER and wants to know if this could have added to his symptoms    Please advise

## 2024-11-05 NOTE — TELEPHONE ENCOUNTER
Pt called to schedule EEG and Hospital follow up.     Pt also wanted Dr. Subramanian to know that be day of pt ED visit, pt had taken an Ambien 10 mg by mistake. Pill somehow got mixed in with pt morning pills. Pt did not know if this somehow could have added to his symptoms in the ED.

## 2024-11-08 NOTE — TELEPHONE ENCOUNTER
Left message on patient voicemail that the Ambien could have caused his transient neurological symptoms. Advised patient to follow through with our plans to obtain an EEG to evaluate for any evidence that his brain has a tendency to have seizures and to stop the Ambien.     Told him to call office if he has any questions

## 2024-11-12 ENCOUNTER — HOSPITAL ENCOUNTER (OUTPATIENT)
Dept: NEUROLOGY | Age: 74
Discharge: HOME | End: 2024-11-12
Payer: MEDICARE

## 2024-11-12 DIAGNOSIS — R41.0 TRANSIENT CONFUSION: ICD-10-CM

## 2024-11-12 PROCEDURE — 95813 EEG EXTND MNTR 61-119 MIN: CPT

## 2024-11-12 PROCEDURE — 95813 EEG EXTND MNTR 61-119 MIN: CPT | Performed by: PSYCHIATRY & NEUROLOGY

## 2025-01-02 ENCOUNTER — OFFICE VISIT (OUTPATIENT)
Dept: FAMILY MEDICINE CLINIC | Age: 75
End: 2025-01-02

## 2025-01-02 VITALS
DIASTOLIC BLOOD PRESSURE: 70 MMHG | HEIGHT: 71 IN | WEIGHT: 155 LBS | BODY MASS INDEX: 21.7 KG/M2 | SYSTOLIC BLOOD PRESSURE: 110 MMHG | HEART RATE: 70 BPM | OXYGEN SATURATION: 97 %

## 2025-01-02 DIAGNOSIS — R10.31 GROIN PAIN, RIGHT: ICD-10-CM

## 2025-01-02 DIAGNOSIS — F51.04 CHRONIC INSOMNIA: ICD-10-CM

## 2025-01-02 DIAGNOSIS — F31.81 BIPOLAR 2 DISORDER (HCC): ICD-10-CM

## 2025-01-02 DIAGNOSIS — R41.0 TRANSIENT CONFUSION: Primary | ICD-10-CM

## 2025-01-02 DIAGNOSIS — F43.21 GRIEF: ICD-10-CM

## 2025-01-02 ASSESSMENT — PATIENT HEALTH QUESTIONNAIRE - PHQ9
SUM OF ALL RESPONSES TO PHQ QUESTIONS 1-9: 0
SUM OF ALL RESPONSES TO PHQ QUESTIONS 1-9: 0
2. FEELING DOWN, DEPRESSED OR HOPELESS: NOT AT ALL
SUM OF ALL RESPONSES TO PHQ QUESTIONS 1-9: 0
SUM OF ALL RESPONSES TO PHQ9 QUESTIONS 1 & 2: 0
SUM OF ALL RESPONSES TO PHQ QUESTIONS 1-9: 0
1. LITTLE INTEREST OR PLEASURE IN DOING THINGS: NOT AT ALL

## 2025-01-02 NOTE — PATIENT INSTRUCTIONS
Please make an appointment with one of our Behavioral Health Consultants, Dr. Nunez, Yamilet Escamilla, or Nesha Valle. They will work with you to develop a plan to improve your overall well-being by addressing any behavioral or mental health factors that are influencing your health. You can schedule your appointment just like you schedule your other appointments here, at the  or over the phone. Each appointment will be 30 minutes long, and you will typically be seen every 2-4 weeks. Your insurance should cover the visit, but you may owe a specialist copay. If you would prefer to be seen by a therapist more frequently, or for a longer visit, please ask your Primary Care Provider for a recommendation.

## 2025-01-02 NOTE — PROGRESS NOTES
Assessment/Plan:    Neftaly was seen today for results.    Diagnoses and all orders for this visit:    Transient confusion, grief   Resolved, likely 2/2 grief/dissociative state. Pt is attending 1st grief group today, and he agrees to make [facility] appt.    Groin pain, right  -     Lavelle Luna MD, Orthopedic Surgery (Hip; Knee; Shoulder), [facility]    Bipolar 2 disorder (HCC)   Stable, per Dr De Souza    Chronic insomnia   Pt has d/c'd ambien and is happy to be off of it, is aware of LUIS EDUARDO potential. Sleep is interrupted x 1 on avg, though sleep time is adequate. Anticipate that [facility] can offer recommendations for improved sleep.        Patient Instructions   Please make an appointment with one of our Behavioral Health Consultants, Dr. Nunez, Yamilet Escamilla, or Nesha Valle. They will work with you to develop a plan to improve your overall well-being by addressing any behavioral or mental health factors that are influencing your health. You can schedule your appointment just like you schedule your other appointments here, at the  or over the phone. Each appointment will be 30 minutes long, and you will typically be seen every 2-4 weeks. Your insurance should cover the visit, but you may owe a specialist copay. If you would prefer to be seen by a therapist more frequently, or for a longer visit, please ask your Primary Care Provider for a recommendation.         Patient: Neftaly Horne is a 74 y.o.male who presents today with the following Chief Complaint(s):  Chief Complaint   Patient presents with    Results     Hospital visit was 11/4/2024.          HPI: Pt with hld, bpd (Dr Joey De Souza), chronic insomnia, erosive esophagitis 2019 (Dr Johnson) presented to ED with amnesia and gait instability 11/3/24. Sx's resolved by time of ED arrival. Son had passed a few days prior to ED visit. CT and CTA head neg. MRI neg, and so CVA was ruled out. EEG     Dr Subramanian's hospital note read, \"Transient confusion with

## 2025-01-07 ENCOUNTER — OFFICE VISIT (OUTPATIENT)
Dept: ORTHOPEDIC SURGERY | Age: 75
End: 2025-01-07
Payer: MEDICARE

## 2025-01-07 ENCOUNTER — TELEMEDICINE (OUTPATIENT)
Dept: PSYCHOLOGY | Age: 75
End: 2025-01-07
Payer: MEDICARE

## 2025-01-07 VITALS — WEIGHT: 155 LBS | BODY MASS INDEX: 21.7 KG/M2 | HEIGHT: 71 IN

## 2025-01-07 DIAGNOSIS — M16.11 PRIMARY OSTEOARTHRITIS OF RIGHT HIP: ICD-10-CM

## 2025-01-07 DIAGNOSIS — F43.20 ADJUSTMENT DISORDER, UNSPECIFIED TYPE: Primary | ICD-10-CM

## 2025-01-07 DIAGNOSIS — R52 PAIN: Primary | ICD-10-CM

## 2025-01-07 PROCEDURE — G8420 CALC BMI NORM PARAMETERS: HCPCS | Performed by: ORTHOPAEDIC SURGERY

## 2025-01-07 PROCEDURE — 3017F COLORECTAL CA SCREEN DOC REV: CPT | Performed by: ORTHOPAEDIC SURGERY

## 2025-01-07 PROCEDURE — 1123F ACP DISCUSS/DSCN MKR DOCD: CPT | Performed by: PSYCHOLOGIST

## 2025-01-07 PROCEDURE — 90791 PSYCH DIAGNOSTIC EVALUATION: CPT | Performed by: PSYCHOLOGIST

## 2025-01-07 PROCEDURE — 1036F TOBACCO NON-USER: CPT | Performed by: ORTHOPAEDIC SURGERY

## 2025-01-07 PROCEDURE — 1123F ACP DISCUSS/DSCN MKR DOCD: CPT | Performed by: ORTHOPAEDIC SURGERY

## 2025-01-07 PROCEDURE — 99203 OFFICE O/P NEW LOW 30 MIN: CPT | Performed by: ORTHOPAEDIC SURGERY

## 2025-01-07 PROCEDURE — G8428 CUR MEDS NOT DOCUMENT: HCPCS | Performed by: ORTHOPAEDIC SURGERY

## 2025-01-07 PROCEDURE — M1308 PR FLU IMMUNIZE NO ADMIN: HCPCS | Performed by: ORTHOPAEDIC SURGERY

## 2025-01-07 NOTE — PROGRESS NOTES
Systolic Blood Pressure: 110 mmHg      Is BP treated: No      HDL Cholesterol: 55 mg/dL      Total Cholesterol: 142 mg/dL   .  Review of Systems  10-point ROS is negative other than HPI.    Physical Exam  Based off 1997 Exam Criteria  Ht 1.803 m (5' 10.98\")   Wt 70.3 kg (155 lb)   BMI 21.63 kg/m²      Constitutional:       General: He is not in acute distress.     Appearance: Normal appearance.   Cardiovascular:      Rate and Rhythm: Normal rate and regular rhythm.      Pulses: Normal pulses.   Pulmonary:      Effort: Pulmonary effort is normal. No respiratory distress.   Neurological:      Mental Status: He is alert and oriented to person, place, and time. Mental status is at baseline.     Musculoskeletal:  Gait:  altered    Skin: Clean and intact.  No open sores or wounds    Lymphatics: No palpable lymph nodes    Spine / Hip Exam:      RIGHT  LEFT    Lumbar Spine Exam  [x] All Neg    [x] All Neg     Straight leg raise  []  []Not tested   []  []Not tested    Clonus  []  []Not tested   []  []Not tested    Pain with motion  []  []Not tested   []  []Not tested    Radiculopathy  []  []Not tested   []  []Not tested    Paraspinal muscle tenderness  [] Paraspinal  []Midline   [] Paraspinal  []Midline   Sensation RIGHT  LEFT    L3  [x] Normal []Decreased    [x] Normal []Decreased   L4  [x] Normal  []Decreased   [x] Normal []Decreased   L5  [x] Normal []Decreased   [x] Normal []Decreased   S1  [x] Normal  []Decreased   [x] Normal []Decreased   Pelvis       Scoliosis  [x] Nml  [] Present     Leg-length discrepency  [x] Equal  [] Right longer   [] Left longer   Range of Motion Active Passive Active Passive   Hip Flexion 120  120    Abduction 50  50    External Rotation @ 90 flex 65  65    Internal Rotation @ 90 flex 20  20           Hip Impingement / Dysplasia  [] All Neg  [] Not tested   [x] All Neg  [] Not tested    Hip impingement test  [x]  []Not tested   []  []Not tested    C-sign  [x]  []Not tested   []  []Not tested

## 2025-01-07 NOTE — PROGRESS NOTES
after a past usage of about 20.0 standard drinks of alcohol per week.  Family History:   Family History   Problem Relation Age of Onset    Mental Illness Mother         depression    High Blood Pressure Mother     Stroke Mother 80    Mental Illness Father         depression    Cancer Father 74        colorectal    Mental Illness Sister         depression    Arthritis Sister     Mental Illness Brother         depression    Arthritis Brother     Thyroid Disease Brother     Cancer Paternal Aunt         ovarian    Cancer Paternal Grandfather         lymphoma    Cancer Paternal Aunt     Diabetes Neg Hx        A:  Patient engaged and cooperative. Patient did not endorse SI. Insight and motivation are good.     Diagnosis:    1. Adjustment disorder, unspecified type          Diagnosis Date    Acid reflux     Sanchez esophagus 2019    Dr Johnson, f/u EGD q 3 yr    Bipolar 2 disorder, major depressive episode (HCC) 2012    hypomania    BPH (benign prostatic hyperplasia)     Depression 1980    Diverticulitis     Erosive esophagitis 2019    Vitamin D deficiency      Plan:  Pt interventions:  Established rapport, Conducted functional assessment, [place]-setting to identify pt's primary goals for [facility] visit / overall health, and Supportive techniques    Pt Behavioral Change Plan:   See Pt Instructions

## 2025-01-21 ENCOUNTER — OFFICE VISIT (OUTPATIENT)
Dept: PSYCHOLOGY | Age: 75
End: 2025-01-21
Payer: MEDICARE

## 2025-01-21 DIAGNOSIS — F43.20 ADJUSTMENT DISORDER, UNSPECIFIED TYPE: Primary | ICD-10-CM

## 2025-01-21 PROCEDURE — 1123F ACP DISCUSS/DSCN MKR DOCD: CPT | Performed by: PSYCHOLOGIST

## 2025-01-21 PROCEDURE — 1036F TOBACCO NON-USER: CPT | Performed by: PSYCHOLOGIST

## 2025-01-21 PROCEDURE — 90832 PSYTX W PT 30 MINUTES: CPT | Performed by: PSYCHOLOGIST

## 2025-01-21 ASSESSMENT — ANXIETY QUESTIONNAIRES
3. WORRYING TOO MUCH ABOUT DIFFERENT THINGS: NOT AT ALL
5. BEING SO RESTLESS THAT IT IS HARD TO SIT STILL: SEVERAL DAYS
6. BECOMING EASILY ANNOYED OR IRRITABLE: NOT AT ALL
GAD7 TOTAL SCORE: 3
2. NOT BEING ABLE TO STOP OR CONTROL WORRYING: NOT AT ALL
4. TROUBLE RELAXING: SEVERAL DAYS
7. FEELING AFRAID AS IF SOMETHING AWFUL MIGHT HAPPEN: NOT AT ALL
1. FEELING NERVOUS, ANXIOUS, OR ON EDGE: SEVERAL DAYS

## 2025-01-21 ASSESSMENT — PATIENT HEALTH QUESTIONNAIRE - PHQ9
1. LITTLE INTEREST OR PLEASURE IN DOING THINGS: NOT AT ALL
SUM OF ALL RESPONSES TO PHQ QUESTIONS 1-9: 0
SUM OF ALL RESPONSES TO PHQ9 QUESTIONS 1 & 2: 0
SUM OF ALL RESPONSES TO PHQ QUESTIONS 1-9: 0
2. FEELING DOWN, DEPRESSED OR HOPELESS: NOT AT ALL
SUM OF ALL RESPONSES TO PHQ QUESTIONS 1-9: 0
SUM OF ALL RESPONSES TO PHQ QUESTIONS 1-9: 0

## 2025-01-21 NOTE — PROGRESS NOTES
Behavioral Health Consultation  Joyce Valle M.A.  Psychology Assistant  India Nunez, Ph.D.  Supervising Psychologist   2025  12:57 PM EST      Time spent with Patient: 32 minutes  This is patient's second  [facility] appointment.    Reason for Consult:    Chief Complaint   Patient presents with    Other     Grief       Feedback given to PCP.    S:  Patient reported that he feels he has steadily reached a plateau of emotions the past few weeks. He discussed that he still thinks about his son all day long, but he does not feel an intense sadness, he recognizes his thoughts as simply thoughts. He discussed that these thoughts are almost like a train going by. This has been both a relief and has led him to feel guilty that he is not experiencing the same sadness. Patient reported that he has been trying to figure out what has changed. He recognizes that he is not the same person before his son , but he is unsure what is different and how to move forward. Patient discussed wanting to keep a relationship with his son. Patient is looking forward to attending a new support group with his wife.     O:  MSE:    Appearance: good hygiene   Attitude: cooperative and friendly  Consciousness: alert  Orientation: oriented to person, place, time, general circumstance  Memory: recent and remote memory intact  Attention/Concentration: intact during session  Psychomotor Activity:normal  Eye Contact: normal  Speech: normal rate and volume, well-articulated  Mood: Contemplative  Affect: euthymic  Perception: within normal limits  Thought Content: within normal limits  Thought Process: logical, coherent and goal-directed  Insight: good  Judgment: intact  Ability to understand instructions: Yes  Ability to respond meaningfully: Yes  Morbid Ideation: no   Suicide Assessment: no suicidal ideation, plan, or intent  Homicidal Ideation: no    History:    Medications:   Current Outpatient Medications   Medication Sig Dispense Refill

## 2025-01-21 NOTE — PATIENT INSTRUCTIONS
“skip” some of them.  These stages are meant as a guide to help you understand your reactions and those of others who are grieving.  Denial:  Denial (not acknowledging the loss) can help contain the shock of loss.   Denial can act as a “safety mechanism” to block out grief until we are ready to handle it.  Sadness and depression:  Deep, intense grief and mourning appear during this stage.  When the full understanding of our loss comes, it can seem overwhelming.  During this stage, you may cry often and unexpectedly. You may not want to be around people or to do things that you normally enjoy.  During this stage, it is best to remain as active as possible and to seek supportive people who will allow you to say what you need to or to cry when you need to.  It is important to allow yourself to work through your full range and experience of emotions.  Anger:  Rage and anger can be intense toward the person who , toward friends and relatives, and even toward God. It is important to have an outlet to release anger through activities such as exercise, hobbies, or through therapy. Guilt, shame, and blame are feelings that need to be addressed, especially if it is toward you.  Acceptance:  This stage includes “coming to terms” with the loss.  It does not mean that you have found the answers to your questions or that you stop thinking about the person who is gone.  It does signify a reinvestment in life and a willingness to readjust to your new circumstances while carrying the memory of your loved one with you.    How to Help Yourself  Give yourself time to grieve.  It is normal and important to express your grief and to work through the concerns that arise for you at this time.  “Stuffing” your feelings may not be helpful and may delay or prolong your grief.  Find supportive people to reach out to during your grief.  This is the time when the support of others may be the most helpful.  Don’t be afraid to tell them how

## 2025-02-04 ENCOUNTER — OFFICE VISIT (OUTPATIENT)
Dept: PSYCHOLOGY | Age: 75
End: 2025-02-04
Payer: MEDICARE

## 2025-02-04 DIAGNOSIS — F43.20 ADJUSTMENT DISORDER, UNSPECIFIED TYPE: Primary | ICD-10-CM

## 2025-02-04 PROCEDURE — 1123F ACP DISCUSS/DSCN MKR DOCD: CPT | Performed by: PSYCHOLOGIST

## 2025-02-04 PROCEDURE — 1036F TOBACCO NON-USER: CPT | Performed by: PSYCHOLOGIST

## 2025-02-04 PROCEDURE — 90832 PSYTX W PT 30 MINUTES: CPT | Performed by: PSYCHOLOGIST

## 2025-02-04 ASSESSMENT — ANXIETY QUESTIONNAIRES
2. NOT BEING ABLE TO STOP OR CONTROL WORRYING: NOT AT ALL
GAD7 TOTAL SCORE: 2
5. BEING SO RESTLESS THAT IT IS HARD TO SIT STILL: SEVERAL DAYS
7. FEELING AFRAID AS IF SOMETHING AWFUL MIGHT HAPPEN: NOT AT ALL
4. TROUBLE RELAXING: SEVERAL DAYS
6. BECOMING EASILY ANNOYED OR IRRITABLE: NOT AT ALL
3. WORRYING TOO MUCH ABOUT DIFFERENT THINGS: NOT AT ALL
1. FEELING NERVOUS, ANXIOUS, OR ON EDGE: NOT AT ALL

## 2025-02-04 ASSESSMENT — PATIENT HEALTH QUESTIONNAIRE - PHQ9
2. FEELING DOWN, DEPRESSED OR HOPELESS: NOT AT ALL
1. LITTLE INTEREST OR PLEASURE IN DOING THINGS: NOT AT ALL
SUM OF ALL RESPONSES TO PHQ QUESTIONS 1-9: 0
SUM OF ALL RESPONSES TO PHQ QUESTIONS 1-9: 0
SUM OF ALL RESPONSES TO PHQ9 QUESTIONS 1 & 2: 0
SUM OF ALL RESPONSES TO PHQ QUESTIONS 1-9: 0
SUM OF ALL RESPONSES TO PHQ QUESTIONS 1-9: 0

## 2025-02-04 NOTE — PATIENT INSTRUCTIONS
Read of how others have responded to a loved one’s death.  You may feel that your own grief is all you can handle.  But if you’d like to look at the ways others have done it, try:  Beyond Grief:  A Guide for Recovering from the Death of a Loved One by Loida Gaspar    Grief’s Courageous Journey by Tara Fuchs and Da Jimenez   The Grief Recovery Handbook:  The Action Program for Moving Beyond Death, Divorce, & Other Losses by Mushtaq JENKINS Grief Observed by BOB Ybarra   by Natacha Chin  When Good-Bye Is Forever by Kaushik Hansen and Grief by Loida Gaspar or  Dalia Small’s Lament for a Son.  There are many others.  Check with a .

## 2025-02-04 NOTE — PROGRESS NOTES
Behavioral Health Consultation  Joyce Valle M.A.  Psychology Assistant  India Nunez, Ph.D.  Supervising Psychologist   2/4/2025  12:59 PM EST      Time spent with Patient: 35 minutes  This is patient's third  [facility] appointment.    Reason for Consult:    Chief Complaint   Patient presents with    Other     Grief       Feedback given to PCP.    S:    Patient reported that he has not been very good the past week or so. He discussed that he has been attending a grief support group with his wife and they have been going through a workbook for group. He discussed that he is at the realization stage of grief where he has recognized that he will have to live the rest of his life without his son. He identified that his son's death feels more definite than it has before. He reported that his workout classes have been his saving ellyn and he has recently been trying to send time to constructively think about his son which has been helpful. Patient discussed that grief is difficult because there are no positive or upsides to focus on in the situation. Discussed shifting self-talk to how would patient talk to a participant in a healing session.       O:  MSE:    Appearance: good hygiene   Attitude: cooperative and friendly  Consciousness: alert  Orientation: oriented to person, place, time, general circumstance  Memory: recent and remote memory intact  Attention/Concentration: intact during session  Psychomotor Activity:normal  Eye Contact: normal  Speech: normal rate and volume, well-articulated  Mood: Contemplative  Affect: euthymic  Perception: within normal limits  Thought Content: within normal limits  Thought Process: logical, coherent and goal-directed  Insight: good  Judgment: intact  Ability to understand instructions: Yes  Ability to respond meaningfully: Yes  Morbid Ideation: no   Suicide Assessment: no suicidal ideation, plan, or intent  Homicidal Ideation: no    History:    Medications:   Current Outpatient

## 2025-02-18 ENCOUNTER — OFFICE VISIT (OUTPATIENT)
Dept: PSYCHOLOGY | Age: 75
End: 2025-02-18

## 2025-02-18 DIAGNOSIS — F43.20 ADJUSTMENT DISORDER, UNSPECIFIED TYPE: Primary | ICD-10-CM

## 2025-02-18 ASSESSMENT — PATIENT HEALTH QUESTIONNAIRE - PHQ9
2. FEELING DOWN, DEPRESSED OR HOPELESS: SEVERAL DAYS
SUM OF ALL RESPONSES TO PHQ QUESTIONS 1-9: 2
SUM OF ALL RESPONSES TO PHQ QUESTIONS 1-9: 2
SUM OF ALL RESPONSES TO PHQ9 QUESTIONS 1 & 2: 2
SUM OF ALL RESPONSES TO PHQ QUESTIONS 1-9: 2
1. LITTLE INTEREST OR PLEASURE IN DOING THINGS: SEVERAL DAYS
SUM OF ALL RESPONSES TO PHQ QUESTIONS 1-9: 2

## 2025-02-18 ASSESSMENT — ANXIETY QUESTIONNAIRES
7. FEELING AFRAID AS IF SOMETHING AWFUL MIGHT HAPPEN: SEVERAL DAYS
1. FEELING NERVOUS, ANXIOUS, OR ON EDGE: SEVERAL DAYS
3. WORRYING TOO MUCH ABOUT DIFFERENT THINGS: NOT AT ALL
5. BEING SO RESTLESS THAT IT IS HARD TO SIT STILL: SEVERAL DAYS
GAD7 TOTAL SCORE: 4
6. BECOMING EASILY ANNOYED OR IRRITABLE: NOT AT ALL
2. NOT BEING ABLE TO STOP OR CONTROL WORRYING: NOT AT ALL
4. TROUBLE RELAXING: SEVERAL DAYS

## 2025-02-18 NOTE — PROGRESS NOTES
Behavioral Health Consultation  Joyce Valle M.A.  Psychology Assistant  India Nunez, Ph.D.  Supervising Psychologist   2/18/2025  12:57 PM EST      Time spent with Patient: 35 minutes  This is patient's fourth  [facility] appointment.    Reason for Consult:    Chief Complaint   Patient presents with    Other     Grief       Feedback given to PCP.    S:    Patient reported that he has been feeling fairly good until he and his wife visited his son's house to clean it out this past week. He discussed that this was hard as he just wanted to get in and out as fast as possible, but his wife wanted to carefully tend to things in the house. Patient described that this experience highlighted how they are both processing their grief in different ways. Patient discussed that he is worried about how his wife is processing, but he is also worried about what these different processes may entail for their future as a  couple. Patient described that one day he wants his family to be happy together and honor his son's memory without the heavy weight they currently feel under.   Patient reported that he has been giving himself ellyn the past two week as discussed last appointment. He reported that he has been good about his self care, and continues to write to his son and write poems about his feelings. Patient discussed that he feels better when he is around other people. He is interested in considering volunteering opportunities.     O:  MSE:    Appearance: good hygiene   Attitude: cooperative and friendly  Consciousness: alert  Orientation: oriented to person, place, time, general circumstance  Memory: recent and remote memory intact  Attention/Concentration: intact during session  Psychomotor Activity:normal  Eye Contact: normal  Speech: normal rate and volume, well-articulated  Mood: contemplative  Affect: euthymic  Perception: within normal limits  Thought Content: within normal limits  Thought Process: logical,

## 2025-03-04 ENCOUNTER — OFFICE VISIT (OUTPATIENT)
Dept: PSYCHOLOGY | Age: 75
End: 2025-03-04

## 2025-03-04 DIAGNOSIS — F43.20 ADJUSTMENT DISORDER, UNSPECIFIED TYPE: Primary | ICD-10-CM

## 2025-03-04 PROCEDURE — 1036F TOBACCO NON-USER: CPT | Performed by: PSYCHOLOGIST

## 2025-03-04 PROCEDURE — NBSRV NON-BILLABLE SERVICE: Performed by: PSYCHOLOGIST

## 2025-03-04 PROCEDURE — 1123F ACP DISCUSS/DSCN MKR DOCD: CPT | Performed by: PSYCHOLOGIST

## 2025-03-04 ASSESSMENT — PATIENT HEALTH QUESTIONNAIRE - PHQ9
SUM OF ALL RESPONSES TO PHQ QUESTIONS 1-9: 0
2. FEELING DOWN, DEPRESSED OR HOPELESS: NOT AT ALL
SUM OF ALL RESPONSES TO PHQ QUESTIONS 1-9: 0
1. LITTLE INTEREST OR PLEASURE IN DOING THINGS: NOT AT ALL
SUM OF ALL RESPONSES TO PHQ QUESTIONS 1-9: 0
SUM OF ALL RESPONSES TO PHQ QUESTIONS 1-9: 0

## 2025-03-04 ASSESSMENT — ANXIETY QUESTIONNAIRES
7. FEELING AFRAID AS IF SOMETHING AWFUL MIGHT HAPPEN: NOT AT ALL
1. FEELING NERVOUS, ANXIOUS, OR ON EDGE: NOT AT ALL
4. TROUBLE RELAXING: SEVERAL DAYS
6. BECOMING EASILY ANNOYED OR IRRITABLE: NOT AT ALL
5. BEING SO RESTLESS THAT IT IS HARD TO SIT STILL: SEVERAL DAYS
GAD7 TOTAL SCORE: 2
3. WORRYING TOO MUCH ABOUT DIFFERENT THINGS: NOT AT ALL
2. NOT BEING ABLE TO STOP OR CONTROL WORRYING: NOT AT ALL

## 2025-03-04 NOTE — PROGRESS NOTES
Behavioral Health Consultation  Joyce Valle M.A.  Psychology Assistant  India Nunez, Ph.D.  Supervising Psychologist   3/4/2025  1:32 PM EST      Time spent with Patient: 30 minutes  This is patient's fifth  [facility] appointment.    Reason for Consult:    Chief Complaint   Patient presents with    Other     Grief       Feedback given to PCP.    S:    Patient reported that he has started to feel like his old self at moments the past week. He discussed that he was able to give his full attention to his classes and he recognized that he was having fun for the first time in class since his son's death. Patient described that he was feeling guilty about having fun but he recognized that he needed to give himself permission to feel the full range of his emotions. Patient discussed how he has recognized that his son is now a memory. 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 He discussed that he has been eating lunch there some days and he feels comfortable in hospitals and helping others. Patient reported minimal benefit from the grief support group which recently finished. He discussed that it was good for him to hear his wife's perception of grief through the group. Patient is looking forward to finding moments of closeness with his wife.     O:  MSE:    Appearance: good hygiene   Attitude: cooperative and friendly  Consciousness: alert  Orientation: oriented to person, place, time, general circumstance  Memory: recent and remote memory intact  Attention/Concentration: intact during session  Psychomotor Activity:normal  Eye Contact: normal  Speech: normal rate and volume, well-articulated  Mood: Interested  Affect: euthymic  Perception: within normal limits  Thought Content: within normal limits  Thought Process: logical, coherent and goal-directed  Insight: good  Judgment: intact  Ability to understand instructions: Yes  Ability to respond meaningfully: Yes  Morbid Ideation: no

## 2025-03-18 ENCOUNTER — OFFICE VISIT (OUTPATIENT)
Dept: PSYCHOLOGY | Age: 75
End: 2025-03-18
Payer: MEDICARE

## 2025-03-18 DIAGNOSIS — F43.20 ADJUSTMENT DISORDER, UNSPECIFIED TYPE: Primary | ICD-10-CM

## 2025-03-18 PROCEDURE — 1036F TOBACCO NON-USER: CPT | Performed by: PSYCHOLOGIST

## 2025-03-18 PROCEDURE — 1123F ACP DISCUSS/DSCN MKR DOCD: CPT | Performed by: PSYCHOLOGIST

## 2025-03-18 PROCEDURE — 90832 PSYTX W PT 30 MINUTES: CPT | Performed by: PSYCHOLOGIST

## 2025-03-18 ASSESSMENT — ANXIETY QUESTIONNAIRES
5. BEING SO RESTLESS THAT IT IS HARD TO SIT STILL: SEVERAL DAYS
3. WORRYING TOO MUCH ABOUT DIFFERENT THINGS: NOT AT ALL
GAD7 TOTAL SCORE: 1
6. BECOMING EASILY ANNOYED OR IRRITABLE: NOT AT ALL
1. FEELING NERVOUS, ANXIOUS, OR ON EDGE: NOT AT ALL
2. NOT BEING ABLE TO STOP OR CONTROL WORRYING: NOT AT ALL
7. FEELING AFRAID AS IF SOMETHING AWFUL MIGHT HAPPEN: NOT AT ALL
4. TROUBLE RELAXING: NOT AT ALL

## 2025-03-18 ASSESSMENT — PATIENT HEALTH QUESTIONNAIRE - PHQ9
SUM OF ALL RESPONSES TO PHQ QUESTIONS 1-9: 0
1. LITTLE INTEREST OR PLEASURE IN DOING THINGS: NOT AT ALL
2. FEELING DOWN, DEPRESSED OR HOPELESS: NOT AT ALL

## 2025-03-18 NOTE — PROGRESS NOTES
Behavioral Health Consultation  Joyce Valle M.A.  Psychology Assistant  India Nunez, Ph.D.  Supervising Psychologist   3/18/2025  3:27 PM EDT      Time spent with Patient: 25 minutes  This is patient's sixth  [facility] appointment.    Reason for Consult:    Chief Complaint   Patient presents with    Other     Grief       Feedback given to PCP.    S:  Patient reported that he continues to write letters to his son which has been helpful for him. He discussed that he has looked back on these letters and seen how he has moved through the grief process. Patient discussed that he has started writing poems about his son for the first time since his death. This has helped him work through acceptance, noticing that losing a son is sad and lonely, but giving him hope that his son's \"light\" is still out there. 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 Patient described he saw a hint of his \"silliness\" come back this week, which was comforting knowing this part of him is not gone forever.     O:  MSE:    Appearance: good hygiene   Attitude: cooperative and friendly  Consciousness: alert  Orientation: oriented to person, place, time, general circumstance  Memory: recent and remote memory intact  Attention/Concentration: intact during session  Psychomotor Activity:normal  Eye Contact: normal  Speech: normal rate and volume, well-articulated  Mood: Interested  Affect: euthymic  Perception: within normal limits  Thought Content: within normal limits  Thought Process: logical, coherent and goal-directed  Insight: good  Judgment: intact  Ability to understand instructions: Yes  Ability to respond meaningfully: Yes  Morbid Ideation: no   Suicide Assessment: no suicidal ideation, plan, or intent  Homicidal Ideation: no    History:    Medications:   Current Outpatient Medications

## 2025-03-21 ENCOUNTER — PATIENT MESSAGE (OUTPATIENT)
Dept: FAMILY MEDICINE CLINIC | Age: 75
End: 2025-03-21

## 2025-03-21 NOTE — TELEPHONE ENCOUNTER
Patients that have stiffness related to joint pain, is often secondary to osteoarthritis, the most common form of arthritis.    Patients often find benefit from over-the-counter naproxen and/or diclofenac gel.  Naproxen is in the same category of medication as ibuprofen, diclofenac gel is a topical medication where the patient can put it right where they need it.

## 2025-04-22 ENCOUNTER — OFFICE VISIT (OUTPATIENT)
Dept: PSYCHOLOGY | Age: 75
End: 2025-04-22
Payer: MEDICARE

## 2025-04-22 DIAGNOSIS — F43.20 ADJUSTMENT DISORDER, UNSPECIFIED TYPE: Primary | ICD-10-CM

## 2025-04-22 PROCEDURE — 1123F ACP DISCUSS/DSCN MKR DOCD: CPT | Performed by: PSYCHOLOGIST

## 2025-04-22 PROCEDURE — 1036F TOBACCO NON-USER: CPT | Performed by: PSYCHOLOGIST

## 2025-04-22 PROCEDURE — 90832 PSYTX W PT 30 MINUTES: CPT | Performed by: PSYCHOLOGIST

## 2025-04-22 ASSESSMENT — ANXIETY QUESTIONNAIRES
5. BEING SO RESTLESS THAT IT IS HARD TO SIT STILL: NOT AT ALL
7. FEELING AFRAID AS IF SOMETHING AWFUL MIGHT HAPPEN: NOT AT ALL
6. BECOMING EASILY ANNOYED OR IRRITABLE: NOT AT ALL
2. NOT BEING ABLE TO STOP OR CONTROL WORRYING: NOT AT ALL
3. WORRYING TOO MUCH ABOUT DIFFERENT THINGS: NOT AT ALL
GAD7 TOTAL SCORE: 0
1. FEELING NERVOUS, ANXIOUS, OR ON EDGE: NOT AT ALL
4. TROUBLE RELAXING: NOT AT ALL

## 2025-04-22 ASSESSMENT — PATIENT HEALTH QUESTIONNAIRE - PHQ9
SUM OF ALL RESPONSES TO PHQ QUESTIONS 1-9: 0
SUM OF ALL RESPONSES TO PHQ QUESTIONS 1-9: 0
1. LITTLE INTEREST OR PLEASURE IN DOING THINGS: NOT AT ALL
SUM OF ALL RESPONSES TO PHQ QUESTIONS 1-9: 0
SUM OF ALL RESPONSES TO PHQ QUESTIONS 1-9: 0
2. FEELING DOWN, DEPRESSED OR HOPELESS: NOT AT ALL

## 2025-04-22 NOTE — PROGRESS NOTES
Behavioral Health Consultation  Joyce Valle M.A.  Psychology Assistant  India Nunez, Ph.D.  Supervising Psychologist   4/22/2025  1:29 PM EDT      Time spent with Patient: 27 minutes  This is patient's seventh  [facility] appointment.    Reason for Consult:    Chief Complaint   Patient presents with    Other     Grief       Feedback given to PCP.    S:  Patient reports that he has been doing well. He reports that he has \"settled in\" with his grief and situation. He has been considering what he can learn out of this situation, how he can move forward with his son's memory in mind. Patient discussed looking for a \"north star\" to indicate he is on the right path. We discussed how values can act as a \"guiding star.\" Patient is interested in completing a values card sort activity to identify how his values may have shifted after the loss of his son. Patient discussed that he has been looking for more spiritual answers recently. He described how his wife got him a book of daily passages for people who have experienced loss. He plans to read this every day and discuss more spiritual questions with a  if needed.       O:  MSE:    Appearance: good hygiene   Attitude: cooperative and friendly  Consciousness: alert  Orientation: oriented to person, place, time, general circumstance  Memory: recent and remote memory intact  Attention/Concentration: intact during session  Psychomotor Activity:normal  Eye Contact: normal  Speech: normal rate and volume, well-articulated  Mood: Interested  Affect: euthymic  Perception: within normal limits  Thought Content: within normal limits  Thought Process: logical, coherent and goal-directed  Insight: good  Judgment: intact  Ability to understand instructions: Yes  Ability to respond meaningfully: Yes  Morbid Ideation: no   Suicide Assessment: no suicidal ideation, plan, or intent  Homicidal Ideation: no    History:    Medications:   Current Outpatient Medications   Medication Sig

## 2025-05-20 ENCOUNTER — OFFICE VISIT (OUTPATIENT)
Dept: PSYCHOLOGY | Age: 75
End: 2025-05-20

## 2025-05-20 DIAGNOSIS — F43.20 ADJUSTMENT DISORDER, UNSPECIFIED TYPE: Primary | ICD-10-CM

## 2025-05-20 ASSESSMENT — ANXIETY QUESTIONNAIRES
6. BECOMING EASILY ANNOYED OR IRRITABLE: NOT AT ALL
2. NOT BEING ABLE TO STOP OR CONTROL WORRYING: NOT AT ALL
4. TROUBLE RELAXING: NOT AT ALL
GAD7 TOTAL SCORE: 0
5. BEING SO RESTLESS THAT IT IS HARD TO SIT STILL: NOT AT ALL
3. WORRYING TOO MUCH ABOUT DIFFERENT THINGS: NOT AT ALL
1. FEELING NERVOUS, ANXIOUS, OR ON EDGE: NOT AT ALL
7. FEELING AFRAID AS IF SOMETHING AWFUL MIGHT HAPPEN: NOT AT ALL

## 2025-05-20 ASSESSMENT — PATIENT HEALTH QUESTIONNAIRE - PHQ9
SUM OF ALL RESPONSES TO PHQ QUESTIONS 1-9: 0
1. LITTLE INTEREST OR PLEASURE IN DOING THINGS: NOT AT ALL
2. FEELING DOWN, DEPRESSED OR HOPELESS: NOT AT ALL
SUM OF ALL RESPONSES TO PHQ QUESTIONS 1-9: 0

## 2025-05-20 NOTE — PROGRESS NOTES
Behavioral Health Consultation  Joyce Valle M.A.  Psychology Assistant  India Nunez, Ph.D.  Supervising Psychologist   5/20/2025  1:28 PM EDT      Time spent with Patient: 25 minutes  This is patient's eighth  [facility] appointment.    Reason for Consult:    Chief Complaint   Patient presents with    Other     Grief       Feedback given to PCP.    S:  Patient reported that he feels he has turned a corner in the last week. 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 Patient described that this feels like a weight lifted off of him and has opened a new door for further writing experiences. He has reflected on how his writing has changed since his son's death and he wants to utilize this opportunity to explore more of his writing. Patient reported that he explored his values as discussed last visit. He identified his top values as health, creativity, work, family, and spirituality. He described that he plans to do a little from each value every day. Patient described that he is ready to explore what intimacy with his wife looks like again.     O:  MSE:    Appearance: good hygiene   Attitude: cooperative and friendly  Consciousness: alert  Orientation: oriented to person, place, time, general circumstance  Memory: recent and remote memory intact  Attention/Concentration: intact during session  Psychomotor Activity:normal  Eye Contact: normal  Speech: normal rate and volume, well-articulated  Mood: interested  Affect: euthymic  Perception: within normal limits  Thought Content: within normal limits  Thought Process: logical, coherent and goal-directed  Insight: good  Judgment: intact  Ability to understand instructions: Yes  Ability to respond meaningfully: Yes  Morbid Ideation: no   Suicide Assessment: no suicidal ideation, plan, or intent  Homicidal Ideation: no    History:    Medications:   Current Outpatient Medications   Medication Sig

## 2025-06-24 ENCOUNTER — OFFICE VISIT (OUTPATIENT)
Dept: PSYCHOLOGY | Age: 75
End: 2025-06-24
Payer: MEDICARE

## 2025-06-24 DIAGNOSIS — F43.20 ADJUSTMENT DISORDER, UNSPECIFIED TYPE: Primary | ICD-10-CM

## 2025-06-24 PROCEDURE — 1036F TOBACCO NON-USER: CPT | Performed by: PSYCHOLOGIST

## 2025-06-24 PROCEDURE — 1123F ACP DISCUSS/DSCN MKR DOCD: CPT | Performed by: PSYCHOLOGIST

## 2025-06-24 PROCEDURE — 90832 PSYTX W PT 30 MINUTES: CPT | Performed by: PSYCHOLOGIST

## 2025-06-24 ASSESSMENT — PATIENT HEALTH QUESTIONNAIRE - PHQ9
SUM OF ALL RESPONSES TO PHQ QUESTIONS 1-9: 0
SUM OF ALL RESPONSES TO PHQ QUESTIONS 1-9: 0
2. FEELING DOWN, DEPRESSED OR HOPELESS: NOT AT ALL
SUM OF ALL RESPONSES TO PHQ QUESTIONS 1-9: 0
SUM OF ALL RESPONSES TO PHQ QUESTIONS 1-9: 0
1. LITTLE INTEREST OR PLEASURE IN DOING THINGS: NOT AT ALL

## 2025-06-24 ASSESSMENT — ANXIETY QUESTIONNAIRES
7. FEELING AFRAID AS IF SOMETHING AWFUL MIGHT HAPPEN: NOT AT ALL
GAD7 TOTAL SCORE: 0
2. NOT BEING ABLE TO STOP OR CONTROL WORRYING: NOT AT ALL
5. BEING SO RESTLESS THAT IT IS HARD TO SIT STILL: NOT AT ALL
4. TROUBLE RELAXING: NOT AT ALL
6. BECOMING EASILY ANNOYED OR IRRITABLE: NOT AT ALL
3. WORRYING TOO MUCH ABOUT DIFFERENT THINGS: NOT AT ALL
1. FEELING NERVOUS, ANXIOUS, OR ON EDGE: NOT AT ALL

## 2025-06-24 NOTE — PATIENT INSTRUCTIONS
Nesha's last day seeing patients at [facility] ([facility]) is on June 24th. If you have any questions please contact Dr. Nunez at [phone].     If you feel as though you are currently doing well and do not need any additional care at this moment, then there is no need to schedule a follow-up visit at this time. If you would like to resume  care at a later time you can call the office at [phone] to schedule a visit or speak with you provider.     If you would like to continue receiving mental health treatment and believe that you may benefit from have longer and more regular visits, I encourage you to consider finding a therapist or counselor in the community. These providers will usually schedule hour long visits every 1-2 weeks as needed which allows them to provide more long-term care and support as you continue to address your mental health needs/concerns. I recommend first looking through "[facility] [employer]" ([email or web address] or call [phone] to schedule initial visit) or [facility] ([email or web address] or call [phone] to schedule initial visit). There are multiple locations across [place] if you wish to attend in-person visits, and many providers also offer telehealth/virtual visits. Both organizations accept most major insurance providers ([place], [employer], [facility], [employer], etc.) and [facility] also accepts Medicare and Medicaid. Otherwise, you can go on Psychology Today ([email or web address]) to search for other therapists in the area.       
No

## 2025-06-24 NOTE — PROGRESS NOTES
Behavioral Health Consultation  Joyce Valle M.A.  Psychology Assistant  India Nunez, Ph.D.  Supervising Psychologist   6/24/2025  12:58 PM EDT      Time spent with Patient: 32 minutes  This is patient's ninth  [facility] appointment.    Reason for Consult:    Chief Complaint   Patient presents with    Other     Grief       Feedback given to PCP.    S:  Patient reported that he generally has been feeling okay but has noticed increased anxiety over the past two days. 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 Patient also discussed that he has been wanting to talk more with his wife about their feelings of grief. He worries that the conversation will upset her but he acknowledged that he does not know what will happen until he tries. Patient considered how he has been honoring his son through his writings and volunteer work.     O:  MSE:    Appearance: good hygiene   Attitude: cooperative and friendly  Consciousness: alert  Orientation: oriented to person, place, time, general circumstance  Memory: recent and remote memory intact  Attention/Concentration: intact during session  Psychomotor Activity:normal  Eye Contact: normal  Speech: normal rate and volume, well-articulated  Mood: interested  Affect: euthymic  Perception: within normal limits  Thought Content: within normal limits  Thought Process: logical, coherent and goal-directed  Insight: good  Judgment: intact  Ability to understand instructions: Yes  Ability to respond meaningfully: Yes  Morbid Ideation: no   Suicide Assessment: no suicidal ideation, plan, or intent  Homicidal Ideation: no    History:    Medications:   Current Outpatient Medications   Medication Sig Dispense Refill    omeprazole (PRILOSEC) 40 MG delayed release capsule Take 1 capsule by mouth every morning (before breakfast) 90 capsule 3    sildenafil

## (undated) DEVICE — Z DISCONTINUED USE 2276105 GOWN PROTCT UNIV CHST W28IN L49IN SL 24IN BLU SPUNBOND FLM

## (undated) DEVICE — CONMED SCOPE SAVER BITE BLOCK, 20X27 MM: Brand: SCOPE SAVER

## (undated) DEVICE — KIT INF CTRL 2OZ LUB TBNG L12FT DBL END BRSH SYR OP4

## (undated) DEVICE — ENDOSCOPIC KIT 2 12 FT OP4 DE2 GWN SYR

## (undated) DEVICE — ENDO CARRY-ON PROCEDURE KIT INCLUDES SUCTION TUBING, LUBRICANT, GAUZE, BIOHAZARD STICKER, TRANSPORT PAD AND INTERCEPT BEDSIDE KIT.: Brand: ENDO CARRY-ON PROCEDURE KIT

## (undated) DEVICE — CANNULA NSL AD TBNG L7FT PVC STR NONFLARED PRNG O2 DEL W STD

## (undated) DEVICE — ELECTRODE,ECG,STRESS,FOAM,3PK: Brand: MEDLINE

## (undated) DEVICE — CANNULA NSL AD L7FT DIV O2 CO2 W/ M LUERLOCK TRMPT CONN

## (undated) DEVICE — GOWN CHEMO POLY-COATED UNIV BLUE: Brand: CARDINAL HEALTH

## (undated) DEVICE — FORCEPS BX L240CM JAW DIA2.8MM L CAP W/ NDL MIC MESH TOOTH